# Patient Record
Sex: MALE | Race: WHITE | NOT HISPANIC OR LATINO | URBAN - METROPOLITAN AREA
[De-identification: names, ages, dates, MRNs, and addresses within clinical notes are randomized per-mention and may not be internally consistent; named-entity substitution may affect disease eponyms.]

---

## 2021-10-15 ENCOUNTER — INPATIENT (INPATIENT)
Facility: HOSPITAL | Age: 86
LOS: 3 days | Discharge: HOME | DRG: 378 | End: 2021-10-19
Attending: STUDENT IN AN ORGANIZED HEALTH CARE EDUCATION/TRAINING PROGRAM | Admitting: HOSPITALIST
Payer: MEDICARE

## 2021-10-15 VITALS
WEIGHT: 126.99 LBS | DIASTOLIC BLOOD PRESSURE: 83 MMHG | OXYGEN SATURATION: 96 % | HEIGHT: 68 IN | TEMPERATURE: 98 F | HEART RATE: 71 BPM | SYSTOLIC BLOOD PRESSURE: 153 MMHG | RESPIRATION RATE: 16 BRPM

## 2021-10-15 DIAGNOSIS — Z29.9 ENCOUNTER FOR PROPHYLACTIC MEASURES, UNSPECIFIED: ICD-10-CM

## 2021-10-15 DIAGNOSIS — K92.1 MELENA: ICD-10-CM

## 2021-10-15 DIAGNOSIS — R71.0 PRECIPITOUS DROP IN HEMATOCRIT: ICD-10-CM

## 2021-10-15 DIAGNOSIS — J44.9 CHRONIC OBSTRUCTIVE PULMONARY DISEASE, UNSPECIFIED: ICD-10-CM

## 2021-10-15 DIAGNOSIS — I10 ESSENTIAL (PRIMARY) HYPERTENSION: ICD-10-CM

## 2021-10-15 DIAGNOSIS — D62 ACUTE POSTHEMORRHAGIC ANEMIA: ICD-10-CM

## 2021-10-15 LAB
ALBUMIN SERPL ELPH-MCNC: 4.1 G/DL — SIGNIFICANT CHANGE UP (ref 3.3–5)
ALP SERPL-CCNC: 83 U/L — SIGNIFICANT CHANGE UP (ref 40–120)
ALT FLD-CCNC: 11 U/L — SIGNIFICANT CHANGE UP (ref 10–45)
ANION GAP SERPL CALC-SCNC: 13 MMOL/L — SIGNIFICANT CHANGE UP (ref 5–17)
APPEARANCE UR: CLEAR — SIGNIFICANT CHANGE UP
APTT BLD: 40 SEC — HIGH (ref 27.5–35.5)
AST SERPL-CCNC: 27 U/L — SIGNIFICANT CHANGE UP (ref 10–40)
BACTERIA # UR AUTO: NEGATIVE — SIGNIFICANT CHANGE UP
BASOPHILS # BLD AUTO: 0.05 K/UL — SIGNIFICANT CHANGE UP (ref 0–0.2)
BASOPHILS NFR BLD AUTO: 0.7 % — SIGNIFICANT CHANGE UP (ref 0–2)
BILIRUB SERPL-MCNC: 0.6 MG/DL — SIGNIFICANT CHANGE UP (ref 0.2–1.2)
BILIRUB UR-MCNC: NEGATIVE — SIGNIFICANT CHANGE UP
BLD GP AB SCN SERPL QL: NEGATIVE — SIGNIFICANT CHANGE UP
BUN SERPL-MCNC: 13 MG/DL — SIGNIFICANT CHANGE UP (ref 7–23)
CALCIUM SERPL-MCNC: 9.6 MG/DL — SIGNIFICANT CHANGE UP (ref 8.4–10.5)
CHLORIDE SERPL-SCNC: 101 MMOL/L — SIGNIFICANT CHANGE UP (ref 96–108)
CO2 SERPL-SCNC: 24 MMOL/L — SIGNIFICANT CHANGE UP (ref 22–31)
COLOR SPEC: COLORLESS — SIGNIFICANT CHANGE UP
CREAT SERPL-MCNC: 0.76 MG/DL — SIGNIFICANT CHANGE UP (ref 0.5–1.3)
DIFF PNL FLD: NEGATIVE — SIGNIFICANT CHANGE UP
EOSINOPHIL # BLD AUTO: 0.24 K/UL — SIGNIFICANT CHANGE UP (ref 0–0.5)
EOSINOPHIL NFR BLD AUTO: 3.3 % — SIGNIFICANT CHANGE UP (ref 0–6)
EPI CELLS # UR: 0 /HPF — SIGNIFICANT CHANGE UP
GLUCOSE SERPL-MCNC: 95 MG/DL — SIGNIFICANT CHANGE UP (ref 70–99)
GLUCOSE UR QL: NEGATIVE — SIGNIFICANT CHANGE UP
HCT VFR BLD CALC: 35.3 % — LOW (ref 39–50)
HCT VFR BLD CALC: 37.6 % — LOW (ref 39–50)
HGB BLD-MCNC: 11.1 G/DL — LOW (ref 13–17)
HGB BLD-MCNC: 12 G/DL — LOW (ref 13–17)
HYALINE CASTS # UR AUTO: 0 /LPF — SIGNIFICANT CHANGE UP (ref 0–2)
IMM GRANULOCYTES NFR BLD AUTO: 0.1 % — SIGNIFICANT CHANGE UP (ref 0–1.5)
INR BLD: 1.15 RATIO — SIGNIFICANT CHANGE UP (ref 0.88–1.16)
KETONES UR-MCNC: NEGATIVE — SIGNIFICANT CHANGE UP
LEUKOCYTE ESTERASE UR-ACNC: NEGATIVE — SIGNIFICANT CHANGE UP
LYMPHOCYTES # BLD AUTO: 1.11 K/UL — SIGNIFICANT CHANGE UP (ref 1–3.3)
LYMPHOCYTES # BLD AUTO: 15 % — SIGNIFICANT CHANGE UP (ref 13–44)
MCHC RBC-ENTMCNC: 30.6 PG — SIGNIFICANT CHANGE UP (ref 27–34)
MCHC RBC-ENTMCNC: 31.3 PG — SIGNIFICANT CHANGE UP (ref 27–34)
MCHC RBC-ENTMCNC: 31.4 GM/DL — LOW (ref 32–36)
MCHC RBC-ENTMCNC: 31.9 GM/DL — LOW (ref 32–36)
MCV RBC AUTO: 97.2 FL — SIGNIFICANT CHANGE UP (ref 80–100)
MCV RBC AUTO: 98.2 FL — SIGNIFICANT CHANGE UP (ref 80–100)
MONOCYTES # BLD AUTO: 0.84 K/UL — SIGNIFICANT CHANGE UP (ref 0–0.9)
MONOCYTES NFR BLD AUTO: 11.4 % — SIGNIFICANT CHANGE UP (ref 2–14)
NEUTROPHILS # BLD AUTO: 5.13 K/UL — SIGNIFICANT CHANGE UP (ref 1.8–7.4)
NEUTROPHILS NFR BLD AUTO: 69.5 % — SIGNIFICANT CHANGE UP (ref 43–77)
NITRITE UR-MCNC: NEGATIVE — SIGNIFICANT CHANGE UP
NRBC # BLD: 0 /100 WBCS — SIGNIFICANT CHANGE UP (ref 0–0)
NRBC # BLD: 0 /100 WBCS — SIGNIFICANT CHANGE UP (ref 0–0)
PH UR: 7 — SIGNIFICANT CHANGE UP (ref 5–8)
PLATELET # BLD AUTO: 219 K/UL — SIGNIFICANT CHANGE UP (ref 150–400)
PLATELET # BLD AUTO: 229 K/UL — SIGNIFICANT CHANGE UP (ref 150–400)
POTASSIUM SERPL-MCNC: 4.4 MMOL/L — SIGNIFICANT CHANGE UP (ref 3.5–5.3)
POTASSIUM SERPL-SCNC: 4.4 MMOL/L — SIGNIFICANT CHANGE UP (ref 3.5–5.3)
PROT SERPL-MCNC: 7 G/DL — SIGNIFICANT CHANGE UP (ref 6–8.3)
PROT UR-MCNC: NEGATIVE — SIGNIFICANT CHANGE UP
PROTHROM AB SERPL-ACNC: 13.7 SEC — HIGH (ref 10.6–13.6)
RBC # BLD: 3.63 M/UL — LOW (ref 4.2–5.8)
RBC # BLD: 3.83 M/UL — LOW (ref 4.2–5.8)
RBC # FLD: 13.3 % — SIGNIFICANT CHANGE UP (ref 10.3–14.5)
RBC # FLD: 13.3 % — SIGNIFICANT CHANGE UP (ref 10.3–14.5)
RBC CASTS # UR COMP ASSIST: 1 /HPF — SIGNIFICANT CHANGE UP (ref 0–4)
RH IG SCN BLD-IMP: POSITIVE — SIGNIFICANT CHANGE UP
SARS-COV-2 RNA SPEC QL NAA+PROBE: SIGNIFICANT CHANGE UP
SODIUM SERPL-SCNC: 138 MMOL/L — SIGNIFICANT CHANGE UP (ref 135–145)
SP GR SPEC: 1.01 — LOW (ref 1.01–1.02)
UROBILINOGEN FLD QL: NEGATIVE — SIGNIFICANT CHANGE UP
WBC # BLD: 5.92 K/UL — SIGNIFICANT CHANGE UP (ref 3.8–10.5)
WBC # BLD: 7.38 K/UL — SIGNIFICANT CHANGE UP (ref 3.8–10.5)
WBC # FLD AUTO: 5.92 K/UL — SIGNIFICANT CHANGE UP (ref 3.8–10.5)
WBC # FLD AUTO: 7.38 K/UL — SIGNIFICANT CHANGE UP (ref 3.8–10.5)
WBC UR QL: 0 /HPF — SIGNIFICANT CHANGE UP (ref 0–5)

## 2021-10-15 PROCEDURE — 93010 ELECTROCARDIOGRAM REPORT: CPT

## 2021-10-15 PROCEDURE — 99221 1ST HOSP IP/OBS SF/LOW 40: CPT | Mod: GC

## 2021-10-15 PROCEDURE — 99223 1ST HOSP IP/OBS HIGH 75: CPT | Mod: GC

## 2021-10-15 PROCEDURE — 99285 EMERGENCY DEPT VISIT HI MDM: CPT

## 2021-10-15 RX ORDER — CARVEDILOL PHOSPHATE 80 MG/1
6.25 CAPSULE, EXTENDED RELEASE ORAL EVERY 12 HOURS
Refills: 0 | Status: DISCONTINUED | OUTPATIENT
Start: 2021-10-15 | End: 2021-10-19

## 2021-10-15 RX ORDER — HYDRALAZINE HCL 50 MG
10 TABLET ORAL ONCE
Refills: 0 | Status: COMPLETED | OUTPATIENT
Start: 2021-10-15 | End: 2021-10-15

## 2021-10-15 RX ORDER — POLYETHYLENE GLYCOL 3350 17 G/17G
17 POWDER, FOR SOLUTION ORAL
Refills: 0 | Status: DISCONTINUED | OUTPATIENT
Start: 2021-10-15 | End: 2021-10-19

## 2021-10-15 RX ORDER — AMLODIPINE BESYLATE 2.5 MG/1
2.5 TABLET ORAL DAILY
Refills: 0 | Status: DISCONTINUED | OUTPATIENT
Start: 2021-10-15 | End: 2021-10-16

## 2021-10-15 RX ORDER — ATORVASTATIN CALCIUM 80 MG/1
40 TABLET, FILM COATED ORAL AT BEDTIME
Refills: 0 | Status: DISCONTINUED | OUTPATIENT
Start: 2021-10-15 | End: 2021-10-19

## 2021-10-15 RX ORDER — LISINOPRIL 2.5 MG/1
20 TABLET ORAL DAILY
Refills: 0 | Status: DISCONTINUED | OUTPATIENT
Start: 2021-10-15 | End: 2021-10-19

## 2021-10-15 RX ORDER — SENNA PLUS 8.6 MG/1
2 TABLET ORAL AT BEDTIME
Refills: 0 | Status: DISCONTINUED | OUTPATIENT
Start: 2021-10-15 | End: 2021-10-19

## 2021-10-15 RX ORDER — BUDESONIDE AND FORMOTEROL FUMARATE DIHYDRATE 160; 4.5 UG/1; UG/1
2 AEROSOL RESPIRATORY (INHALATION)
Refills: 0 | Status: DISCONTINUED | OUTPATIENT
Start: 2021-10-15 | End: 2021-10-19

## 2021-10-15 RX ORDER — INFLUENZA VIRUS VACCINE 15; 15; 15; 15 UG/.5ML; UG/.5ML; UG/.5ML; UG/.5ML
0.5 SUSPENSION INTRAMUSCULAR ONCE
Refills: 0 | Status: DISCONTINUED | OUTPATIENT
Start: 2021-10-15 | End: 2021-10-19

## 2021-10-15 RX ORDER — ALBUTEROL 90 UG/1
2 AEROSOL, METERED ORAL EVERY 6 HOURS
Refills: 0 | Status: DISCONTINUED | OUTPATIENT
Start: 2021-10-15 | End: 2021-10-16

## 2021-10-15 RX ADMIN — ATORVASTATIN CALCIUM 40 MILLIGRAM(S): 80 TABLET, FILM COATED ORAL at 23:05

## 2021-10-15 RX ADMIN — CARVEDILOL PHOSPHATE 6.25 MILLIGRAM(S): 80 CAPSULE, EXTENDED RELEASE ORAL at 18:58

## 2021-10-15 RX ADMIN — AMLODIPINE BESYLATE 2.5 MILLIGRAM(S): 2.5 TABLET ORAL at 18:59

## 2021-10-15 RX ADMIN — SENNA PLUS 2 TABLET(S): 8.6 TABLET ORAL at 23:04

## 2021-10-15 RX ADMIN — LISINOPRIL 20 MILLIGRAM(S): 2.5 TABLET ORAL at 18:58

## 2021-10-15 RX ADMIN — Medication 10 MILLIGRAM(S): at 23:51

## 2021-10-15 NOTE — ED PROVIDER NOTE - ATTENDING CONTRIBUTION TO CARE
I, Dakota Lozano, performed a history and physical exam of the patient and discussed their management with the resident and /or advanced care provider. I reviewed the resident and /or ACP's note and agree with the documented findings and plan of care. I was present and available for all procedures.  Patient with lower GI bleed. Exam c/w bleed from hemorrhoids. Will evaluate h/h and repeat h/h considering history of extent of bleeding. Will transfuse as indicate. Patient without bleeding in the ED which has resolve spontaneously.  Patient stable in ED.

## 2021-10-15 NOTE — ED ADULT NURSE REASSESSMENT NOTE - NS ED NURSE REASSESS COMMENT FT1
1245 No c/o pain or rectal bleeding at present. A&Ox4. IVL intact R wrist without sx of infilt. Fall risk precautions maintained.

## 2021-10-15 NOTE — H&P ADULT - PROBLEM SELECTOR PLAN 2
-trend CBC for HgB q12hr. If HgB continues to downtrend with additional ep of hematochezia, will revaluate role for intervention   -active type and screen. Transfuse if Hgb <8 ISO 2 episodes of hematochezia with clots. Hgb 12.0 @ 12pm and 11.1 @ 3:32 on 10/15.  -trend CBC for HgB q12hr. If HgB continues to downtrend with additional ep of hematochezia, will revaluate role for intervention   -active type and screen. Transfuse if Hgb <8  - GI on board

## 2021-10-15 NOTE — ED ADULT NURSE REASSESSMENT NOTE - NS ED NURSE REASSESS COMMENT FT1
1255 Voided 350cc clear yellow urine in urinal. Episode of melena, foul odor. Gown and chux saturated. Pt c/o lightheadedness. Dr aware. A&Ox4. 2nd T&S drawn and sent. 2nd IV inserted #18 LACF. Pt denies chest pain, palp or SOB

## 2021-10-15 NOTE — H&P ADULT - NSHPSOCIALHISTORY_GEN_ALL_CORE
Former smoke, social drinker Former smoke multiple packs per day, stopped ~ 20 years ago, Drank 3 beers a day, stopped 1 week before hernia surgery ~ 2 months ago

## 2021-10-15 NOTE — CONSULT NOTE ADULT - SUBJECTIVE AND OBJECTIVE BOX
Patient is a 86y old  Male who presents with a chief complaint of     HPI: 86yoM with hx of COPD (no home O2), CAD, recent left inguinal hernia repair presents with blood per rectum. Today morning, patient had urge to have BM but passed bright red blood with blood clots into the toilet over the course of 3 hours. He felt lightheaded and dizzy not denies syncope. No prior hx of GIB in the past. Recently, he had a left inguinal hernia surgery 1-2 months ago and developed constipation, 1 BM every other day with straining. He took otc bowel regimen and had softer stools but he recently discontinued it. Denies abdominal pain, N/V, diarrhea, or melena. Prior to today, he denies blood while wiping and denies pain with BM. He takes a daily baby ASA per his cardiologist for years. Intermittently takes Motrin for pain at surgical site. No other NSAID use or steriods. Last colonscopy many years ago, and unable to recall results. No prior EGD.     REVIEW OF SYSTEMS:  Constitutional: No fever, weight loss or fatigue  Respiratory: No cough, wheezing, chills or hemoptysis  Cardiovascular: No chest pain, palpitations, dizziness or leg swelling  Gastrointestinal: No abdominal or epigastric pain. No nausea, vomiting or hematemesis; No diarrhea. +constipation. No melena. +hematochezia.  Skin: No itching, burning, rashes or lesions   Musculoskeletal: No joint pain or swelling; No muscle, back or extremity pain    PAST MEDICAL & SURGICAL HISTORY:  HTN (hypertension)    COPD mixed type    Surgical Hx  Left inguinal hernia repair    FAMILY HISTORY:  Daughter passed from unknown cancer 5 years ago    SOCIAL HISTORY:  Smoking Status: [ ] Current, [X] Former, [ ] Never  Social drinker  No recreational drug use    MEDICATIONS:  Home meds: incomplete list but includes 81mg ASA, atorvastatin, albuterol inhaler, and ellipta    Allergies    No Known Allergies    Intolerances        Vital Signs Last 24 Hrs  T(C): 36.9 (15 Oct 2021 14:54), Max: 36.9 (15 Oct 2021 11:58)  T(F): 98.4 (15 Oct 2021 14:54), Max: 98.5 (15 Oct 2021 11:58)  HR: 65 (15 Oct 2021 14:54) (65 - 71)  BP: 157/82 (15 Oct 2021 14:54) (153/83 - 183/88)  BP(mean): --  RR: 16 (15 Oct 2021 14:54) (16 - 16)  SpO2: 96% (15 Oct 2021 14:54) (96% - 96%)        PHYSICAL EXAM:    General: Well developed; well nourished; in no acute distress  HEENT: MMM, conjunctiva and sclera clear  Cardiac: RRR with S1 and S2, no murmurs  Respiratory: CTA b/l, no wheezes or rhonchi   Gastrointestinal: Soft, non-tender non-distended; Normal bowel sounds; No rebound or guarding. Well healing left inguinal surgical scar.   Rectal: dark red blood rectal vault, enlarged prostate. nontender.   Extremities: Normal range of motion, No clubbing, cyanosis or edema  Neurological: Alert and oriented x3  Skin: Warm and dry. No obvious rash      LABS:                        12.0   7.38  )-----------( 229      ( 15 Oct 2021 12:12 )             37.6     10-15    138  |  101  |  13  ----------------------------<  95  4.4   |  24  |  0.76    Ca    9.6      15 Oct 2021 12:12    TPro  7.0  /  Alb  4.1  /  TBili  0.6  /  DBili  x   /  AST  27  /  ALT  11  /  AlkPhos  83  10-15        RADIOLOGY & ADDITIONAL STUDIES:

## 2021-10-15 NOTE — ED PROVIDER NOTE - RECTUM PROSTATE EXAM
+small external hemorrhoid @6oclock without active bleeding. small amt of BRB in underwear/abnormal/expand...

## 2021-10-15 NOTE — H&P ADULT - ASSESSMENT
85yo M with hx of COPD (no home O2), CAD, left inguinal hernia repair presents with 1 day of BRBPR with Hgb 12 likely in setting of LGIB (diverticular vs internal hemorrhoids vs AVM) 86 y.o M with hx of COPD (no home O2), CAD, left inguinal hernia repair presents with 1 day of BRBPR with Hgb 12 likely in setting of LGIB (diverticular vs internal hemorrhoids vs AVM). Patient hemodynamically stable.

## 2021-10-15 NOTE — H&P ADULT - NSHPREVIEWOFSYSTEMS_GEN_ALL_CORE
REVIEW OF SYSTEMS:  Constitutional: No fever, weight loss or fatigue  Respiratory: No cough, wheezing, chills or hemoptysis  Cardiovascular: No chest pain, palpitations, dizziness or leg swelling  Gastrointestinal: No abdominal or epigastric pain. No nausea, vomiting or hematemesis; No diarrhea. +constipation. No melena. +hematochezia.  Skin: No itching, burning, rashes or lesions   Musculoskeletal: No joint pain or swelling; No muscle, back or extremity pain REVIEW OF SYSTEMS:  Constitutional: No fever, weight loss or fatigue  Respiratory: + mild SOB  Cardiovascular: No chest pain, palpitations, dizziness or leg swelling  Gastrointestinal: No abdominal or epigastric pain. No nausea, vomiting or hematemesis; No diarrhea. +constipation. No melena. +hematochezia.  Skin: No itching, burning, rashes or lesions   Musculoskeletal: No joint pain or swelling; No muscle, back or extremity pain

## 2021-10-15 NOTE — H&P ADULT - NSHPPHYSICALEXAM_GEN_ALL_CORE
Vital Signs Last 24 Hrs  T(C): 36.9 (15 Oct 2021 14:54), Max: 36.9 (15 Oct 2021 11:58)  T(F): 98.4 (15 Oct 2021 14:54), Max: 98.5 (15 Oct 2021 11:58)  HR: 65 (15 Oct 2021 14:54) (65 - 71)  BP: 157/82 (15 Oct 2021 14:54) (153/83 - 183/88)  BP(mean): --  RR: 16 (15 Oct 2021 14:54) (16 - 16)  SpO2: 96% (15 Oct 2021 14:54) (96% - 96%)      General: Well developed; well nourished; in no acute distress  HEENT: MMM, conjunctiva and sclera clear  Cardiac: RRR with S1 and S2, no murmurs  Respiratory: CTA b/l, no wheezes or rhonchi   Gastrointestinal: Soft, non-tender non-distended; Normal bowel sounds; No rebound or guarding. Well healing left inguinal surgical scar.   Rectal: dark red blood rectal vault, enlarged prostate. nontender.   Extremities: Normal range of motion, No clubbing, cyanosis or edema  Neurological: Alert and oriented x3  Skin: Warm and dry. No obvious rash Vital Signs Last 24 Hrs  T(C): 36.9 (15 Oct 2021 14:54), Max: 36.9 (15 Oct 2021 11:58)  T(F): 98.4 (15 Oct 2021 14:54), Max: 98.5 (15 Oct 2021 11:58)  HR: 65 (15 Oct 2021 14:54) (65 - 71)  BP: 157/82 (15 Oct 2021 14:54) (153/83 - 183/88)  BP(mean): --  RR: 16 (15 Oct 2021 14:54) (16 - 16)  SpO2: 96% (15 Oct 2021 14:54) (96% - 96%)      General: Well developed; well nourished; in no acute distress  HEENT: MMM, conjunctiva pallor  Cardiac: Difficulty auscultating  Respiratory: CTA b/l, no wheezes or rhonchi   Gastrointestinal: Soft, non-tender non-distended; Normal bowel sounds; No rebound or guarding. Well healing left inguinal surgical scar.   Rectal: dark red blood rectal vault, enlarged prostate. nontender.   Extremities: Normal range of motion, No clubbing, cyanosis or edema  Neurological: Alert and oriented x3  Skin: Warm and dry. No obvious rash

## 2021-10-15 NOTE — CONSULT NOTE ADULT - ASSESSMENT
85yo M with hx of COPD (no home O2), CAD, left inguinal hernia repair presents with 1 day of BRBPR with Hgb 12 likely in setting of LGIB (diverticular vs internal hemorrhoids vs AVM)    #Hematochezia: 1 episode in the morning with passage of blood clots, painless. Uses low dose ASA. Hx of constipation. Initial Hgb 12. Differentials for LGIB include diverticular bleed vs internal hemorrhoids vs AVMs)  #Constipation: BM every other day with straining    Recommendations:  -trend CBC for HgB q12hr. If HgB continues to downtrend with additional ep of hematochezia, will revaluate role for intervention   -active type and screen. Transfuse if Hgb <8  -start Miralax BID PRN    *pending final recs    Danielle Swanson MD  PGY3 Internal Medicine   Pager [phone]   87yo M with hx of COPD (no home O2), CAD, left inguinal hernia repair presents with 1 day of BRBPR with Hgb 12 likely in setting of LGIB (diverticular vs internal hemorrhoids vs AVM)    #Hematochezia: 1 episode in the morning with passage of blood clots, painless. Uses low dose ASA. Hx of constipation. Initial Hgb 12. Differentials for LGIB include diverticular bleed vs internal hemorrhoids vs AVMs)  #Constipation: BM every other day with straining    Recommendations:  -trend CBC for HgB q12hr. If HgB continues to downtrend with additional ep of hematochezia, will revaluate role for intervention   -active type and screen. Transfuse if Hgb <8  -start Miralax BID PRN    d/w with Dr. Sultan Danielle Swanson MD  PGY3 Internal Medicine   Pager [phone]

## 2021-10-15 NOTE — ED PROVIDER NOTE - CLINICAL SUMMARY MEDICAL DECISION MAKING FREE TEXT BOX
Patient with lower GI bleed. Exam c/w bleed from hemorrhoids. Will evaluate h/h and repeat h/h considering history of extent of bleeding. Will transfuse as indicate. Patient without bleeding in the ED which has resolve spontaneously.  Patient currently stable.

## 2021-10-15 NOTE — ED ADULT NURSE NOTE - OBJECTIVE STATEMENT
85 yo presents to the ED from home. A&Ox4 by EMS with wife at bedside c/o rectal bleeding since this AM. pt reports multiple episodes of bright red blood after having BM this morning. pt reports "a lot of blood gushing out" for one hour. pt reports fatigue at the time, denies fatigue SOB currently. pt reports being on aspirin daily. pt denies abd pain. pt denies nausea, vomiting. abd soft nondistended nontender. 20G in R forearm. Patient undressed and placed into gown, call bell in hand and side rails up for safety. warm blanket provided, vital signs stable, pt in no acute distress.

## 2021-10-15 NOTE — ED PROVIDER NOTE - PROGRESS NOTE DETAILS
H/H 12/37.6. Pt had another episode of hematochezia with moderate amt of blood, several small clots. VSS and pt feels well. will repeat CBC. pt tba for hematochezia. call placed to pts PMD x1 awaiting callback - Dontrell Anderson PA-C spoke to Dr. Oliveros - admits to general hospitalist. GI consult sent via email. pt stable for admission. - Dontrell Anderson PA-C

## 2021-10-15 NOTE — ED PROVIDER NOTE - OBJECTIVE STATEMENT
86y male PMHx HTN, COPD, takes ASA 81mg daily (preventative) p/w BRBPR. pt reports significant amt of painless bright red blood during a bowel movement today. reports it "poured" into the toilet intermittently for about an hour before resolving. never had this before. denies history of hemorrhoids, colon ca, polyps. denies abdominal pain, NVDC, urinary symptoms, bruising, CP, SOB, weakness. ---- 86y male PMHx HTN, COPD, takes ASA 81mg daily (preventative) p/w BRBPR. pt reports significant amt of painless bright red blood during a bowel movement today. reports it "poured" into the toilet intermittently for about an hour before resolving. never had this before. felt lightheaded briefly during the episode, does not feel lightheaded currently. denies history of hemorrhoids, colon ca, polyps. denies abdominal pain, NVDC, urinary symptoms, bruising, CP, SOB, weakness.

## 2021-10-15 NOTE — H&P ADULT - HISTORY OF PRESENT ILLNESS
HPI: 86yoM with hx of COPD (no home O2), CAD, recent left inguinal hernia repair presents with blood per rectum. Today morning, patient had urge to have BM but passed bright red blood with blood clots into the toilet over the course of 3 hours. He felt lightheaded and dizzy not denies syncope. No prior hx of GIB in the past. Recently, he had a left inguinal hernia surgery 1-2 months ago and developed constipation, 1 BM every other day with straining. He took otc bowel regimen and had softer stools but he recently discontinued it. Denies abdominal pain, N/V, diarrhea, or melena. Prior to today, he denies blood while wiping and denies pain with BM. He takes a daily baby ASA per his cardiologist for years. Intermittently takes Motrin for pain at surgical site. No other NSAID use or steriods. Last colonscopy many years ago, and unable to recall results. No prior EGD.  HPI: 86yoM with hx of COPD (no home O2), CAD, recent left inguinal hernia repair 2021 presents with blood per rectum. Today morning, patient had urge to have BM but passed bright red blood with blood clots into the toilet over the course of 3 hours. He felt lightheaded and dizzy not denies syncope. No prior hx of GIB in the past. Recently, he had a left inguinal hernia surgery 1-2 months ago and developed constipation, 1 BM every other day with straining. He took otc bowel regimen and had softer stools but he recently discontinued it. Denies abdominal pain, N/V, diarrhea, or melena. Prior to today, he denies blood while wiping and denies pain with BM. He takes a daily baby ASA per his cardiologist for years. Intermittently takes Motrin for pain at surgical site. No other NSAID use or steriods. Last colonscopy many years ago, and unable to recall results. No prior EGD.  HPI: 86yoM with hx of COPD, CAD, recent left inguinal hernia repair 2021 presents with blood per rectum. Today morning, patient had urge to have BM but passed bright red blood with blood clots into the toilet over the course of 3 hours. He felt lightheaded and dizzy not denies syncope. No prior hx of GIB in the past. Recently, he had a left inguinal hernia surgery 1-2 months ago and developed constipation, 1 BM every other day with straining. He took otc bowel regimen and had softer stools but he recently discontinued it. Denies abdominal pain, N/V, diarrhea, or melena. Prior to today, he denies blood while wiping and denies pain with BM. He takes a daily baby ASA per his cardiologist for years. Intermittently takes Motrin for pain at surgical site. No other NSAID use or steriods. Last colonscopy many years ago, and unable to recall results. No prior EGD.

## 2021-10-15 NOTE — CONSULT NOTE ADULT - ATTENDING COMMENTS
GI bleeding  two episodes BRPPR  Suspect colon source  Patient not ready to commit to colonoscopy  Will follow clinically  Continue regular diet

## 2021-10-15 NOTE — H&P ADULT - PROBLEM SELECTOR PLAN 1
1 episode in the morning with passage of blood clots, painless. Uses low dose ASA. Hx of constipation. Initial Hgb 12. Differentials for LGIB include diverticular bleed vs internal hemorrhoids vs AVMs)  - Constipation: BM every other day with straining  -trend CBC for HgB q12hr. If HgB continues to downtrend with additional ep of hematochezia, will revaluate role for intervention   -active type and screen. Transfuse if Hgb <8  -start Miralax BID PRN One episode in the morning with passage of blood clots, painless. second episode with blood clots in ED. Uses low dose ASA. Hx of constipation. Initial Hgb 12. Differentials for LGIB include diverticular bleed vs internal hemorrhoids vs AVMs)  - Constipation: BM every other day with straining  - trend CBC for HgB q12hr. If HgB continues to downtrend with additional ep of hematochezia, will revaluate role for intervention   -active type and screen. Transfuse if Hgb <8  -start Miralax BID PRN  - GI on board

## 2021-10-15 NOTE — H&P ADULT - ATTENDING COMMENTS
86yoM with hx of COPD, CAD, recent left inguinal hernia repair 2021 pw episodes of hematochezia.     Hemodynamically stable. Monitor bleeding and CBC. Bowel regimen for constipation. GI on board.     No current COPD exacerbation.

## 2021-10-15 NOTE — H&P ADULT - NSHPLABSRESULTS_GEN_ALL_CORE
11.1   5.92  )-----------( 219      ( 15 Oct 2021 15:32 )             35.3       10-15    138  |  101  |  13  ----------------------------<  95  4.4   |  24  |  0.76    Ca    9.6      15 Oct 2021 12:12  TPro  7.0  /  Alb  4.1  /  TBili  0.6  /  DBili  x   /  AST  27  /  ALT  11  /  AlkPhos  83  10-15    PT/INR - ( 15 Oct 2021 12:12 )   PT: 13.7 sec;   INR: 1.15 ratio         PTT - ( 15 Oct 2021 12:12 )  PTT:40.0 sec      Urinalysis Basic - ( 15 Oct 2021 13:10 )    Color: Colorless / Appearance: Clear / S.008 / pH: x  Gluc: x / Ketone: Negative  / Bili: Negative / Urobili: Negative   Blood: x / Protein: Negative / Nitrite: Negative   Leuk Esterase: Negative / RBC: 1 /hpf / WBC 0 /HPF   Sq Epi: x / Non Sq Epi: 0 /hpf / Bacteria: Negative

## 2021-10-16 LAB
ANION GAP SERPL CALC-SCNC: 13 MMOL/L — SIGNIFICANT CHANGE UP (ref 5–17)
BUN SERPL-MCNC: 12 MG/DL — SIGNIFICANT CHANGE UP (ref 7–23)
CALCIUM SERPL-MCNC: 9.5 MG/DL — SIGNIFICANT CHANGE UP (ref 8.4–10.5)
CHLORIDE SERPL-SCNC: 100 MMOL/L — SIGNIFICANT CHANGE UP (ref 96–108)
CO2 SERPL-SCNC: 26 MMOL/L — SIGNIFICANT CHANGE UP (ref 22–31)
COVID-19 SPIKE DOMAIN AB INTERP: POSITIVE
COVID-19 SPIKE DOMAIN ANTIBODY RESULT: 219 U/ML — HIGH
CREAT SERPL-MCNC: 0.8 MG/DL — SIGNIFICANT CHANGE UP (ref 0.5–1.3)
GLUCOSE SERPL-MCNC: 97 MG/DL — SIGNIFICANT CHANGE UP (ref 70–99)
HCT VFR BLD CALC: 36 % — LOW (ref 39–50)
HGB BLD-MCNC: 11.4 G/DL — LOW (ref 13–17)
MAGNESIUM SERPL-MCNC: 1.7 MG/DL — SIGNIFICANT CHANGE UP (ref 1.6–2.6)
MCHC RBC-ENTMCNC: 30.4 PG — SIGNIFICANT CHANGE UP (ref 27–34)
MCHC RBC-ENTMCNC: 31.7 GM/DL — LOW (ref 32–36)
MCV RBC AUTO: 96 FL — SIGNIFICANT CHANGE UP (ref 80–100)
NRBC # BLD: 0 /100 WBCS — SIGNIFICANT CHANGE UP (ref 0–0)
PHOSPHATE SERPL-MCNC: 3.4 MG/DL — SIGNIFICANT CHANGE UP (ref 2.5–4.5)
PLATELET # BLD AUTO: 226 K/UL — SIGNIFICANT CHANGE UP (ref 150–400)
POTASSIUM SERPL-MCNC: 4.2 MMOL/L — SIGNIFICANT CHANGE UP (ref 3.5–5.3)
POTASSIUM SERPL-SCNC: 4.2 MMOL/L — SIGNIFICANT CHANGE UP (ref 3.5–5.3)
RBC # BLD: 3.75 M/UL — LOW (ref 4.2–5.8)
RBC # FLD: 13.3 % — SIGNIFICANT CHANGE UP (ref 10.3–14.5)
SARS-COV-2 IGG+IGM SERPL QL IA: 219 U/ML — HIGH
SARS-COV-2 IGG+IGM SERPL QL IA: POSITIVE
SODIUM SERPL-SCNC: 139 MMOL/L — SIGNIFICANT CHANGE UP (ref 135–145)
WBC # BLD: 6.28 K/UL — SIGNIFICANT CHANGE UP (ref 3.8–10.5)
WBC # FLD AUTO: 6.28 K/UL — SIGNIFICANT CHANGE UP (ref 3.8–10.5)

## 2021-10-16 PROCEDURE — 99232 SBSQ HOSP IP/OBS MODERATE 35: CPT | Mod: GC

## 2021-10-16 RX ORDER — IPRATROPIUM/ALBUTEROL SULFATE 18-103MCG
3 AEROSOL WITH ADAPTER (GRAM) INHALATION EVERY 6 HOURS
Refills: 0 | Status: DISCONTINUED | OUTPATIENT
Start: 2021-10-16 | End: 2021-10-19

## 2021-10-16 RX ORDER — AMLODIPINE BESYLATE 2.5 MG/1
10 TABLET ORAL DAILY
Refills: 0 | Status: DISCONTINUED | OUTPATIENT
Start: 2021-10-17 | End: 2021-10-19

## 2021-10-16 RX ORDER — IPRATROPIUM/ALBUTEROL SULFATE 18-103MCG
3 AEROSOL WITH ADAPTER (GRAM) INHALATION ONCE
Refills: 0 | Status: COMPLETED | OUTPATIENT
Start: 2021-10-16 | End: 2021-10-16

## 2021-10-16 RX ORDER — AMLODIPINE BESYLATE 2.5 MG/1
5 TABLET ORAL ONCE
Refills: 0 | Status: COMPLETED | OUTPATIENT
Start: 2021-10-16 | End: 2021-10-16

## 2021-10-16 RX ORDER — TIOTROPIUM BROMIDE 18 UG/1
1 CAPSULE ORAL; RESPIRATORY (INHALATION) DAILY
Refills: 0 | Status: DISCONTINUED | OUTPATIENT
Start: 2021-10-16 | End: 2021-10-16

## 2021-10-16 RX ADMIN — ATORVASTATIN CALCIUM 40 MILLIGRAM(S): 80 TABLET, FILM COATED ORAL at 21:33

## 2021-10-16 RX ADMIN — CARVEDILOL PHOSPHATE 6.25 MILLIGRAM(S): 80 CAPSULE, EXTENDED RELEASE ORAL at 05:03

## 2021-10-16 RX ADMIN — Medication 3 MILLILITER(S): at 01:11

## 2021-10-16 RX ADMIN — CARVEDILOL PHOSPHATE 6.25 MILLIGRAM(S): 80 CAPSULE, EXTENDED RELEASE ORAL at 17:54

## 2021-10-16 RX ADMIN — ALBUTEROL 2 PUFF(S): 90 AEROSOL, METERED ORAL at 00:56

## 2021-10-16 RX ADMIN — SENNA PLUS 2 TABLET(S): 8.6 TABLET ORAL at 21:33

## 2021-10-16 RX ADMIN — LISINOPRIL 20 MILLIGRAM(S): 2.5 TABLET ORAL at 05:04

## 2021-10-16 RX ADMIN — AMLODIPINE BESYLATE 5 MILLIGRAM(S): 2.5 TABLET ORAL at 11:16

## 2021-10-16 RX ADMIN — BUDESONIDE AND FORMOTEROL FUMARATE DIHYDRATE 2 PUFF(S): 160; 4.5 AEROSOL RESPIRATORY (INHALATION) at 17:56

## 2021-10-16 NOTE — PROVIDER CONTACT NOTE (OTHER) - BACKGROUND
Admitted with bright red blood by rectum Hx of CAD  COPD
Admitted with Bright red blood per rectum Hx of COPD, CAD

## 2021-10-16 NOTE — PROVIDER CONTACT NOTE (OTHER) - ACTION/TREATMENT ORDERED:
Same administered will continue to monitor B/p
Albuterol 2puffs given as per PRN order with no relief .Duoneb nebulizerx1 given with relief will continue to monitor breathing pattern

## 2021-10-16 NOTE — PROVIDER CONTACT NOTE (OTHER) - ASSESSMENT
Pt awake alert oriented x3 denies headache blurred vision
Pt awake alert oriented x4 with SOB  respiratory rate 22breaths/min O2sat 96%in room air  stating feeling no air going in no wheezing on ausculation

## 2021-10-16 NOTE — PROGRESS NOTE ADULT - SUBJECTIVE AND OBJECTIVE BOX
VENITA TRUJILLOOLPH  86y  Male      Patient is a 86y old  Male who presents with a chief complaint of Hematochezia with clots (15 Oct 2021 17:20)      INTERVAL HPI/OVERNIGHT EVENTS: Hb stabilized @ 11.1 on 10/15 night. BP of 188/80 -> hydralazine 10. SBP this am 10/16 -> 147. SOB overnight, given nebulizer as albuterol didn't help.      REVIEW OF SYSTEMS:  Constitutional: No fever, weight loss or fatigue  Respiratory: + mild SOB, no wheezing  Cardiovascular: No chest pain, palpitations, dizziness or leg swelling  Gastrointestinal: No abdominal or epigastric pain. No nausea, vomiting or hematemesis; +constipation. +hematochezia.  Skin: No itching, burning, rashes or lesions   Musculoskeletal: No joint pain or swelling; No muscle, back or extremity pain    T(C): 36.6 (10-16-21 @ 04:18), Max: 37.3 (10-15-21 @ 16:21)  HR: 66 (10-16-21 @ 04:18) (62 - 71)  BP: 147/68 (10-16-21 @ 04:18) (147/68 - 188/80)  RR: 18 (10-16-21 @ 04:18) (16 - 23)  SpO2: 95% (10-16-21 @ 04:18) (95% - 99%)  Wt(kg): --Vital Signs Last 24 Hrs  T(C): 36.6 (16 Oct 2021 04:18), Max: 37.3 (15 Oct 2021 16:21)  T(F): 97.9 (16 Oct 2021 04:18), Max: 99.1 (15 Oct 2021 16:21)  HR: 66 (16 Oct 2021 04:18) (62 - 71)  BP: 147/68 (16 Oct 2021 04:18) (147/68 - 188/80)  BP(mean): --  RR: 18 (16 Oct 2021 04:18) (16 - 23)  SpO2: 95% (16 Oct 2021 04:18) (95% - 99%)  No Known Allergies      PHYSICAL EXAM:    General: Well developed; well nourished; in no acute distress  HEENT: MMM, conjunctiva pallor  Cardiac: Difficulty auscultating  Respiratory: CTA b/l, no wheezes or rhonchi   Gastrointestinal: Soft, non-tender non-distended; Normal bowel sounds; No rebound or guarding. Well healing left inguinal surgical scar.   Rectal: dark red blood rectal vault, enlarged prostate. nontender.   Extremities: Normal range of motion, No clubbing, cyanosis or edema  Neurological: Alert and oriented x3  Skin: Warm and dry. No obvious rash      LABS:                          11.4   6.28  )-----------( 226      ( 16 Oct 2021 05:11 )             36.0       10    139  |  100  |  12  ----------------------------<  97  4.2   |  26  |  0.80    Ca    9.5      16 Oct 2021 05:11  Mg     1.7     10-16  Phos  3.4     10-16  TPro  7.0  /  Alb  4.1  /  TBili  0.6  /  DBili  x   /  AST  27  /  ALT  11  /  AlkPhos  83  10-15    PT/INR - ( 15 Oct 2021 12:12 )   PT: 13.7 sec;   INR: 1.15 ratio         PTT - ( 15 Oct 2021 12:12 )  PTT:40.0 sec      Urinalysis Basic - ( 15 Oct 2021 13:10 )    Color: Colorless / Appearance: Clear / S.008 / pH: x  Gluc: x / Ketone: Negative  / Bili: Negative / Urobili: Negative   Blood: x / Protein: Negative / Nitrite: Negative   Leuk Esterase: Negative / RBC: 1 /hpf / WBC 0 /HPF   Sq Epi: x / Non Sq Epi: 0 /hpf / Bacteria: Negative          RADIOLOGY & ADDITIONAL TESTS:    Imaging Personally Reviewed:  [ ] YES  [ ] NO  ALBUTerol    90 MICROgram(s) HFA Inhaler 2 Puff(s) Inhalation every 6 hours PRN  amLODIPine   Tablet 2.5 milliGRAM(s) Oral daily  atorvastatin 40 milliGRAM(s) Oral at bedtime  budesonide  80 MICROgram(s)/formoterol 4.5 MICROgram(s) Inhaler 2 Puff(s) Inhalation two times a day  carvedilol 6.25 milliGRAM(s) Oral every 12 hours  influenza   Vaccine 0.5 milliLiter(s) IntraMuscular once  lisinopril 20 milliGRAM(s) Oral daily  polyethylene glycol 3350 17 Gram(s) Oral two times a day PRN  senna 2 Tablet(s) Oral at bedtime      HEALTH ISSUES - PROBLEM Dx:  Hematochezia    Acute blood loss anemia    Drop in hemoglobin    HTN (hypertension)    COPD mixed type    Prophylactic measure

## 2021-10-17 LAB
HCT VFR BLD CALC: 35.6 % — LOW (ref 39–50)
HGB BLD-MCNC: 11.3 G/DL — LOW (ref 13–17)
MCHC RBC-ENTMCNC: 30.5 PG — SIGNIFICANT CHANGE UP (ref 27–34)
MCHC RBC-ENTMCNC: 31.7 GM/DL — LOW (ref 32–36)
MCV RBC AUTO: 96.2 FL — SIGNIFICANT CHANGE UP (ref 80–100)
NRBC # BLD: 0 /100 WBCS — SIGNIFICANT CHANGE UP (ref 0–0)
PLATELET # BLD AUTO: 214 K/UL — SIGNIFICANT CHANGE UP (ref 150–400)
RBC # BLD: 3.7 M/UL — LOW (ref 4.2–5.8)
RBC # FLD: 13.2 % — SIGNIFICANT CHANGE UP (ref 10.3–14.5)
WBC # BLD: 7.34 K/UL — SIGNIFICANT CHANGE UP (ref 3.8–10.5)
WBC # FLD AUTO: 7.34 K/UL — SIGNIFICANT CHANGE UP (ref 3.8–10.5)

## 2021-10-17 PROCEDURE — 99232 SBSQ HOSP IP/OBS MODERATE 35: CPT | Mod: GC

## 2021-10-17 RX ORDER — SOD SULF/SODIUM/NAHCO3/KCL/PEG
1000 SOLUTION, RECONSTITUTED, ORAL ORAL EVERY 4 HOURS
Refills: 0 | Status: COMPLETED | OUTPATIENT
Start: 2021-10-17 | End: 2021-10-17

## 2021-10-17 RX ADMIN — BUDESONIDE AND FORMOTEROL FUMARATE DIHYDRATE 2 PUFF(S): 160; 4.5 AEROSOL RESPIRATORY (INHALATION) at 06:27

## 2021-10-17 RX ADMIN — BUDESONIDE AND FORMOTEROL FUMARATE DIHYDRATE 2 PUFF(S): 160; 4.5 AEROSOL RESPIRATORY (INHALATION) at 18:07

## 2021-10-17 RX ADMIN — CARVEDILOL PHOSPHATE 6.25 MILLIGRAM(S): 80 CAPSULE, EXTENDED RELEASE ORAL at 18:07

## 2021-10-17 RX ADMIN — ATORVASTATIN CALCIUM 40 MILLIGRAM(S): 80 TABLET, FILM COATED ORAL at 21:50

## 2021-10-17 RX ADMIN — Medication 1000 MILLILITER(S): at 16:55

## 2021-10-17 RX ADMIN — Medication 1000 MILLILITER(S): at 19:59

## 2021-10-17 RX ADMIN — SENNA PLUS 2 TABLET(S): 8.6 TABLET ORAL at 21:50

## 2021-10-17 RX ADMIN — Medication 10 MILLIGRAM(S): at 21:50

## 2021-10-17 RX ADMIN — CARVEDILOL PHOSPHATE 6.25 MILLIGRAM(S): 80 CAPSULE, EXTENDED RELEASE ORAL at 05:28

## 2021-10-17 RX ADMIN — LISINOPRIL 20 MILLIGRAM(S): 2.5 TABLET ORAL at 05:28

## 2021-10-17 RX ADMIN — AMLODIPINE BESYLATE 10 MILLIGRAM(S): 2.5 TABLET ORAL at 05:28

## 2021-10-17 NOTE — CHART NOTE - NSCHARTNOTEFT_GEN_A_CORE
Contacted by nurse for patient having a BP of 188/80. Gave patient hydralazine 10. Patient also complaining of shortness of breath. On PE, lungs were clear to auscultation. Satting well. States that he usually uses a nebulizer at home. Patient was given albuterol without improvement of symptoms. Ordered duoneb treatment as well.
GI following for hematochezia. Initially patient hesitant to pursue colonoscopy, now agreeable. Will plan for colonoscopy tomorrow.     Instructions for colonoscopy  - clear liquid diet today, NPO at midnight  - please ensure golytely is completed (to be ordered by GI fellow)  - please ensure patient drinks entire prep  - please ensure morning labs are drawn at 2am, electrolytes repleted as necessary, and Hg>7  - rest of care per primary team    GI will continue to follow.     Malachi Hayden, PGY5  Gastroenterology/Hepatology Fellow  Available on [employer] Teams  [phone] ([facility] Short Range Pager)  [phone] (Long Range Pager)    After 5pm, please contact the on-call GI fellow. [phone]

## 2021-10-17 NOTE — PROGRESS NOTE ADULT - SUBJECTIVE AND OBJECTIVE BOX
PROGRESS NOTE:     Patient is a 86y old  Male who presents with a chief complaint of Hematochezia with clots (16 Oct 2021 07:26)      SUBJECTIVE / OVERNIGHT EVENTS: Pt seen and examined. No acute overnight events. In the morning, pt denies fever, chills, CP, SOB, abdominal pain, N/V, urinary or bowel issues.     ADDITIONAL REVIEW OF SYSTEMS: Otherwise negative    MEDICATIONS  (STANDING):  amLODIPine   Tablet 10 milliGRAM(s) Oral daily  atorvastatin 40 milliGRAM(s) Oral at bedtime  bisacodyl 10 milliGRAM(s) Oral at bedtime  budesonide  80 MICROgram(s)/formoterol 4.5 MICROgram(s) Inhaler 2 Puff(s) Inhalation two times a day  carvedilol 6.25 milliGRAM(s) Oral every 12 hours  influenza   Vaccine 0.5 milliLiter(s) IntraMuscular once  lisinopril 20 milliGRAM(s) Oral daily  polyethylene glycol/electrolyte Solution 1000 milliLiter(s) Oral every 4 hours  senna 2 Tablet(s) Oral at bedtime    MEDICATIONS  (PRN):  albuterol/ipratropium for Nebulization 3 milliLiter(s) Nebulizer every 6 hours PRN Shortness of Breath and/or Wheezing  polyethylene glycol 3350 17 Gram(s) Oral two times a day PRN Constipation      CAPILLARY BLOOD GLUCOSE        I&O's Summary      PHYSICAL EXAM:  Vital Signs Last 24 Hrs  T(C): 36.8 (17 Oct 2021 05:10), Max: 36.8 (17 Oct 2021 05:10)  T(F): 98.2 (17 Oct 2021 05:10), Max: 98.2 (17 Oct 2021 05:10)  HR: 61 (17 Oct 2021 05:10) (61 - 76)  BP: 164/72 (17 Oct 2021 05:10) (116/63 - 172/75)  BP(mean): --  RR: 18 (17 Oct 2021 05:10) (18 - 20)  SpO2: 96% (17 Oct 2021 05:10) (95% - 98%)    CONSTITUTIONAL: NAD  RESPIRATORY: Normal respiratory effort; lungs are clear to auscultation bilaterally  CARDIOVASCULAR: Regular rate and rhythm, normal S1 and S2, no murmur/rub/gallop  ABDOMEN: Nontender to palpation, normoactive bowel sounds, no rebound/guarding; No hepatosplenomegaly  EXTREMITIES: No lower extremity edema; Peripheral pulses are 2+ bilaterally  MUSCLOSKELETAL: no clubbing or cyanosis of digits; no joint swelling or tenderness to palpation  PSYCH: A+O to person, place, and time; affect appropriate    LABS:                        11.4   6.28  )-----------( 226      ( 16 Oct 2021 05:11 )             36.0     10-16    139  |  100  |  12  ----------------------------<  97  4.2   |  26  |  0.80    Ca    9.5      16 Oct 2021 05:11  Phos  3.4     10-16  Mg     1.7     10-16    TPro  7.0  /  Alb  4.1  /  TBili  0.6  /  DBili  x   /  AST  27  /  ALT  11  /  AlkPhos  83  10-15    PT/INR - ( 15 Oct 2021 12:12 )   PT: 13.7 sec;   INR: 1.15 ratio         PTT - ( 15 Oct 2021 12:12 )  PTT:40.0 sec      Urinalysis Basic - ( 15 Oct 2021 13:10 )    Color: Colorless / Appearance: Clear / S.008 / pH: x  Gluc: x / Ketone: Negative  / Bili: Negative / Urobili: Negative   Blood: x / Protein: Negative / Nitrite: Negative   Leuk Esterase: Negative / RBC: 1 /hpf / WBC 0 /HPF   Sq Epi: x / Non Sq Epi: 0 /hpf / Bacteria: Negative          RADIOLOGY & ADDITIONAL TESTS:  Results Reviewed: Y  Imaging Personally Reviewed: Y  Electrocardiogram Personally Reviewed: Y    COORDINATION OF CARE:  Care Discussed with Consultants/Other Providers [Y/N]: Y  Prior or Outpatient Records Reviewed [Y/N]: Y

## 2021-10-18 ENCOUNTER — TRANSCRIPTION ENCOUNTER (OUTPATIENT)
Age: 86
End: 2021-10-18

## 2021-10-18 LAB
ANION GAP SERPL CALC-SCNC: 20 MMOL/L — HIGH (ref 5–17)
APTT BLD: 36 SEC — HIGH (ref 27.5–35.5)
BLD GP AB SCN SERPL QL: NEGATIVE — SIGNIFICANT CHANGE UP
BUN SERPL-MCNC: 14 MG/DL — SIGNIFICANT CHANGE UP (ref 7–23)
CALCIUM SERPL-MCNC: 9.9 MG/DL — SIGNIFICANT CHANGE UP (ref 8.4–10.5)
CHLORIDE SERPL-SCNC: 103 MMOL/L — SIGNIFICANT CHANGE UP (ref 96–108)
CO2 SERPL-SCNC: 21 MMOL/L — LOW (ref 22–31)
CREAT SERPL-MCNC: 0.8 MG/DL — SIGNIFICANT CHANGE UP (ref 0.5–1.3)
GLUCOSE SERPL-MCNC: 96 MG/DL — SIGNIFICANT CHANGE UP (ref 70–99)
HCT VFR BLD CALC: 39.9 % — SIGNIFICANT CHANGE UP (ref 39–50)
HGB BLD-MCNC: 12.6 G/DL — LOW (ref 13–17)
INR BLD: 1.1 RATIO — SIGNIFICANT CHANGE UP (ref 0.88–1.16)
MAGNESIUM SERPL-MCNC: 1.9 MG/DL — SIGNIFICANT CHANGE UP (ref 1.6–2.6)
MCHC RBC-ENTMCNC: 30.5 PG — SIGNIFICANT CHANGE UP (ref 27–34)
MCHC RBC-ENTMCNC: 31.6 GM/DL — LOW (ref 32–36)
MCV RBC AUTO: 96.6 FL — SIGNIFICANT CHANGE UP (ref 80–100)
NRBC # BLD: 0 /100 WBCS — SIGNIFICANT CHANGE UP (ref 0–0)
PHOSPHATE SERPL-MCNC: 3.5 MG/DL — SIGNIFICANT CHANGE UP (ref 2.5–4.5)
PLATELET # BLD AUTO: 253 K/UL — SIGNIFICANT CHANGE UP (ref 150–400)
POTASSIUM SERPL-MCNC: 3.9 MMOL/L — SIGNIFICANT CHANGE UP (ref 3.5–5.3)
POTASSIUM SERPL-SCNC: 3.9 MMOL/L — SIGNIFICANT CHANGE UP (ref 3.5–5.3)
PROTHROM AB SERPL-ACNC: 13.1 SEC — SIGNIFICANT CHANGE UP (ref 10.6–13.6)
RBC # BLD: 4.13 M/UL — LOW (ref 4.2–5.8)
RBC # FLD: 13 % — SIGNIFICANT CHANGE UP (ref 10.3–14.5)
RH IG SCN BLD-IMP: POSITIVE — SIGNIFICANT CHANGE UP
SODIUM SERPL-SCNC: 144 MMOL/L — SIGNIFICANT CHANGE UP (ref 135–145)
WBC # BLD: 11.24 K/UL — HIGH (ref 3.8–10.5)
WBC # FLD AUTO: 11.24 K/UL — HIGH (ref 3.8–10.5)

## 2021-10-18 PROCEDURE — 45378 DIAGNOSTIC COLONOSCOPY: CPT | Mod: GC

## 2021-10-18 PROCEDURE — 99232 SBSQ HOSP IP/OBS MODERATE 35: CPT | Mod: GC

## 2021-10-18 RX ORDER — PANTOPRAZOLE SODIUM 20 MG/1
40 TABLET, DELAYED RELEASE ORAL
Refills: 0 | Status: DISCONTINUED | OUTPATIENT
Start: 2021-10-18 | End: 2021-10-19

## 2021-10-18 RX ORDER — SODIUM CHLORIDE 9 MG/ML
500 INJECTION INTRAMUSCULAR; INTRAVENOUS; SUBCUTANEOUS
Refills: 0 | Status: DISCONTINUED | OUTPATIENT
Start: 2021-10-18 | End: 2021-10-19

## 2021-10-18 RX ADMIN — BUDESONIDE AND FORMOTEROL FUMARATE DIHYDRATE 2 PUFF(S): 160; 4.5 AEROSOL RESPIRATORY (INHALATION) at 17:22

## 2021-10-18 RX ADMIN — BUDESONIDE AND FORMOTEROL FUMARATE DIHYDRATE 2 PUFF(S): 160; 4.5 AEROSOL RESPIRATORY (INHALATION) at 05:49

## 2021-10-18 RX ADMIN — CARVEDILOL PHOSPHATE 6.25 MILLIGRAM(S): 80 CAPSULE, EXTENDED RELEASE ORAL at 18:27

## 2021-10-18 RX ADMIN — CARVEDILOL PHOSPHATE 6.25 MILLIGRAM(S): 80 CAPSULE, EXTENDED RELEASE ORAL at 05:13

## 2021-10-18 RX ADMIN — ATORVASTATIN CALCIUM 40 MILLIGRAM(S): 80 TABLET, FILM COATED ORAL at 21:23

## 2021-10-18 RX ADMIN — AMLODIPINE BESYLATE 10 MILLIGRAM(S): 2.5 TABLET ORAL at 05:13

## 2021-10-18 RX ADMIN — LISINOPRIL 20 MILLIGRAM(S): 2.5 TABLET ORAL at 05:13

## 2021-10-18 RX ADMIN — SENNA PLUS 2 TABLET(S): 8.6 TABLET ORAL at 21:23

## 2021-10-18 NOTE — DIETITIAN INITIAL EVALUATION ADULT. - PROBLEM SELECTOR PLAN 1
One episode in the morning with passage of blood clots, painless. second episode with blood clots in ED. Uses low dose ASA. Hx of constipation. Initial Hgb 12. Differentials for LGIB include diverticular bleed vs internal hemorrhoids vs AVMs)  - Constipation: BM every other day with straining  - trend CBC for HgB q12hr. If HgB continues to downtrend with additional ep of hematochezia, will revaluate role for intervention   -active type and screen. Transfuse if Hgb <8  -start Miralax BID PRN  - GI on board

## 2021-10-18 NOTE — DIETITIAN INITIAL EVALUATION ADULT. - SIGNS/SYMPTOMS
as evidenced by inadequate oral intake, wt loss, muscle and fat losses as evidenced by BMI 16.8 kg/m2

## 2021-10-18 NOTE — DISCHARGE NOTE PROVIDER - NSDCCPTREATMENT_GEN_ALL_CORE_FT
PRINCIPAL PROCEDURE  Procedure: Colonoscopy  Findings and Treatment: A colonoscopy is an exam used to detect changes or abnormalities in the large intestine (colon) and rectum.  During a colonoscopy, a long, flexible tube (colonoscope) is inserted into the rectum. A tiny video camera at the tip of the tube allows the doctor to view the inside of the entire colon.  You had a colonoscopy done in order to determine the source of the blood in your stool. The results of your colonoscopy show that you have diverticulosis which is likely what caused the bleeding. At the time of colonoscopy the divertocilosis was not actively bleeding.   Please follow up with your primary care doctor to manage this condition.

## 2021-10-18 NOTE — DISCHARGE NOTE PROVIDER - NSDCMRMEDTOKEN_GEN_ALL_CORE_FT
ALBUTEROL HFA 90 MCG INHALER:   ATORVASTATIN 40 MG TABLET:   CARVEDILOL 6.25 MG TABLET:   FELODIPINE ER 10 MG TABLET:   INCRUSE ELLIPTA 62.5 MCG INH:   LISINOPRIL 20 MG TABLET:    ALBUTEROL HFA 90 MCG INHALER:   ATORVASTATIN 40 MG TABLET:   CARVEDILOL 6.25 MG TABLET:   FELODIPINE ER 10 MG TABLET:   INCRUSE ELLIPTA 62.5 MCG INH:   LISINOPRIL 20 MG TABLET:   polyethylene glycol 3350 oral powder for reconstitution: 17 gram(s) orally 2 times a day, As needed, Constipation   ALBUTEROL HFA 90 MCG INHALER: 1 puff(s) inhaled every 6 hours, As Needed  ATORVASTATIN 40 MG TABLET: 1 tab(s) orally once a day  CARVEDILOL 6.25 MG TABLET: 1 tab(s) orally 2 times a day  FELODIPINE ER 10 MG TABLET: 1 tab(s) orally once a day  INCRUSE ELLIPTA 62.5 MCG INH: 1 puff(s) inhaled 2 times a day  LISINOPRIL 20 MG TABLET: 1 tab(s) orally once a day  polyethylene glycol 3350 oral powder for reconstitution: 17 gram(s) orally 2 times a day, As needed, Constipation

## 2021-10-18 NOTE — PROGRESS NOTE ADULT - SUBJECTIVE AND OBJECTIVE BOX
PROGRESS NOTE:     Patient is a 86y old  Male who presents with a chief complaint of Hematochezia with clots (16 Oct 2021 07:26)      SUBJECTIVE / OVERNIGHT EVENTS: pt reports drinking 2 jugs of prep and is ready for colonoscopy today. Pt seen and examined. No acute overnight events.    MEDICATIONS  (STANDING):  amLODIPine   Tablet 10 milliGRAM(s) Oral daily  atorvastatin 40 milliGRAM(s) Oral at bedtime  budesonide  80 MICROgram(s)/formoterol 4.5 MICROgram(s) Inhaler 2 Puff(s) Inhalation two times a day  carvedilol 6.25 milliGRAM(s) Oral every 12 hours  influenza   Vaccine 0.5 milliLiter(s) IntraMuscular once  lisinopril 20 milliGRAM(s) Oral daily  senna 2 Tablet(s) Oral at bedtime    MEDICATIONS  (PRN):  albuterol/ipratropium for Nebulization 3 milliLiter(s) Nebulizer every 6 hours PRN Shortness of Breath and/or Wheezing  polyethylene glycol 3350 17 Gram(s) Oral two times a day PRN Constipation        CAPILLARY BLOOD GLUCOSE        I&O's Summary      PHYSICAL EXAM:    Vital Signs Last 24 Hrs  T(C): 36.4 (10-18-21 @ 04:57), Max: 36.4 (10-17-21 @ 20:59)  T(F): 97.5 (10-18-21 @ 04:57), Max: 97.5 (10-17-21 @ 20:59)  HR: 69 (10-18-21 @ 06:15) (62 - 76)  BP: 131/66 (10-18-21 @ 06:15) (123/69 - 172/84)  BP(mean): --  RR: 18 (10-18-21 @ 04:57) (18 - 18)  SpO2: 95% (10-18-21 @ 04:57) (95% - 97%)        CONSTITUTIONAL: NAD  RESPIRATORY: Normal respiratory effort; lungs are clear to auscultation bilaterally  CARDIOVASCULAR: Regular rate and rhythm, normal S1 and S2, no murmur/rub/gallop  ABDOMEN: Nontender to palpation, normoactive bowel sounds, no rebound/guarding; No hepatosplenomegaly  EXTREMITIES: No lower extremity edema; Peripheral pulses are 2+ bilaterally  MUSCLOSKELETAL: no clubbing or cyanosis of digits; no joint swelling or tenderness to palpation  PSYCH: A+O to person, place, and time; affect appropriate    LABS:                                              12.6   11.24 )-----------( 253      ( 18 Oct 2021 05:40 )             39.9       10    144  |  103  |  14  ----------------------------<  96  3.9   |  21<L>  |  0.80    Ca    9.9      18 Oct 2021 05:40  Mg     1.9     10-18  Phos  3.5     10-18    PT/INR - ( 18 Oct 2021 05:40 )   PT: 13.1 sec;   INR: 1.10 ratio         PTT - ( 18 Oct 2021 05:40 )  PTT:36.0 sec              Urinalysis Basic - ( 15 Oct 2021 13:10 )    Color: Colorless / Appearance: Clear / S.008 / pH: x  Gluc: x / Ketone: Negative  / Bili: Negative / Urobili: Negative   Blood: x / Protein: Negative / Nitrite: Negative   Leuk Esterase: Negative / RBC: 1 /hpf / WBC 0 /HPF   Sq Epi: x / Non Sq Epi: 0 /hpf / Bacteria: Negative          RADIOLOGY & ADDITIONAL TESTS:  Results Reviewed: Y  Imaging Personally Reviewed: Y  Electrocardiogram Personally Reviewed: Y    COORDINATION OF CARE:  Care Discussed with Consultants/Other Providers [Y/N]: Y  Prior or Outpatient Records Reviewed [Y/N]: Y

## 2021-10-18 NOTE — DIETITIAN INITIAL EVALUATION ADULT. - REASON
pt declined; observed: severe fat loss to orbital and buccal regions, severe muscle loss to clavicle region; some losses likely age-related

## 2021-10-18 NOTE — DIETITIAN INITIAL EVALUATION ADULT. - PROBLEM SELECTOR PLAN 2
ISO 2 episodes of hematochezia with clots. Hgb 12.0 @ 12pm and 11.1 @ 3:32 on 10/15.  -trend CBC for HgB q12hr. If HgB continues to downtrend with additional ep of hematochezia, will revaluate role for intervention   -active type and screen. Transfuse if Hgb <8  - GI on board

## 2021-10-18 NOTE — DIETITIAN NUTRITION RISK NOTIFICATION - TREATMENT: THE FOLLOWING DIET HAS BEEN RECOMMENDED
Diet, DASH/TLC:   Sodium & Cholesterol Restricted  Fiber/Residue Restricted (LOWFIBER)  Supplement Feeding Modality:  Oral  Ensure Clear Cans or Servings Per Day:  2       Frequency:  Daily (10-18-21 @ 16:49) [Pending Verification By Attending]

## 2021-10-18 NOTE — DIETITIAN INITIAL EVALUATION ADULT. - REASON INDICATOR FOR ASSESSMENT
BMI <19. Source: EMR, pt. Pt is an 85 yo male with PMH of COPD, CAD, and recent L inguinal hernia repair (2021) who presented with blood per rectum. Admitted 10/15.

## 2021-10-18 NOTE — DIETITIAN INITIAL EVALUATION ADULT. - OTHER INFO
Pt with limited interest in speaking with RD at this time - awaiting meal tray as NPO earlier today for EGD/Colonoscopy - escalated. Last documented BM yesterday. Pt denies difficulties chewing or swallowing. Confirmed NKFA.     Pt reports progressive weight loss in unknown time frame. Reports  pounds. Dosing weight 110.4 pounds suggests ~35% wt loss in unknown time frame. Will continue to monitor and trend weights as able.     Encouraged PO intake as tolerated and provided brief low fiber diet education. Limited diet education able to be provided as pt not interested. Pt amenable to recommendations. Made aware RD remains available.

## 2021-10-18 NOTE — PRE-ANESTHESIA EVALUATION ADULT - NSANTHOSAYNRD_GEN_A_CORE
No. CLAUDY screening performed.  STOP BANG Legend: 0-2 = LOW Risk; 3-4 = INTERMEDIATE Risk; 5-8 = HIGH Risk

## 2021-10-18 NOTE — DISCHARGE NOTE PROVIDER - CARE PROVIDER_API CALL
Phu Pepe)  Gastroenterology; Internal Medicine  [address] [place] [address]  Phone: [phone]  Fax: [phone]  Follow Up Time:     LEO JAI  Internal Medicine  [address] [place] [id number]  Phone: [phone]  Fax: [phone]  Follow Up Time: 1 week

## 2021-10-18 NOTE — DISCHARGE NOTE PROVIDER - DETAILS OF MALNUTRITION DIAGNOSIS/DIAGNOSES
This patient has been assessed with a concern for Malnutrition and was treated during this hospitalization for the following Nutrition diagnosis/diagnoses:     -  10/18/2021: Mild protein-calorie malnutrition   -  10/18/2021: Underweight (BMI < 19)

## 2021-10-18 NOTE — DISCHARGE NOTE PROVIDER - HOSPITAL COURSE
86 y.o M with hx of COPD (no home O2), CAD, left inguinal hernia repair presents with 1 day of BRBPR with Hgb 12 likely in setting of LGIB (diverticular vs internal hemorrhoids vs AVM). Pt had another episode of hematochezia in the ED with passage of clots and lightheadedness during the episode. Pt hemoglobin remained hemodynamically stable with hb 12.6 on 10/18. Pt was initially not agreeable to colonoscopy but on 10/18 colonoscopy was completed. Medically stable for discharge. 86 y.o M with hx of COPD (no home O2), CAD, left inguinal hernia repair presents with 1 day of BRBPR with Hgb 12 likely in setting of LGIB (diverticular vs internal hemorrhoids vs AVM). Pt had another episode of hematochezia in the ED with passage of clots and lightheadedness during the episode. Pt hemoglobin remained hemodynamically stable with hb 12.6 on 10/18. Pt was initially not agreeable to colonoscopy but on 10/18 colonoscopy shows diverticulosis, no active bleed. Patient declined EGD. Medically stable for discharge.

## 2021-10-18 NOTE — DISCHARGE NOTE PROVIDER - CARE PROVIDERS DIRECT ADDRESSES
[email or web address]

## 2021-10-18 NOTE — DIETITIAN INITIAL EVALUATION ADULT. - ETIOLOGY
related to prolonged inadequate protein-energy intake related to likely inadequate protein-energy intake with increased nutrient needs in setting of COPD

## 2021-10-18 NOTE — DISCHARGE NOTE PROVIDER - NSDCCPCAREPLAN_GEN_ALL_CORE_FT
PRINCIPAL DISCHARGE DIAGNOSIS  Diagnosis: Hematochezia  Assessment and Plan of Treatment: Blood in the stool is usually a sign of bleeding somewhere in the digestive tract. Sometimes the amount of blood is so small that it can only be detected by a fecal occult test (which checks for hidden blood in the stool). At other times it may be visible in the toilet or on toilet paper as bright red blood.  In adults, most common causes are hemorrhoids and diverticulosis, both of which are relatively benign; however, it can also be caused by colorectal cancer, which is potentially fatal.  You had an endoscopy that showed diverticulosis which was most likely the source of your bleeding. You will need to follow up with your GI doctor. If you experience severe bleeding and dizziness or feelings of fainting please call 911.      SECONDARY DISCHARGE DIAGNOSES  Diagnosis: Acute blood loss anemia  Assessment and Plan of Treatment: Anemia is a condition in which you lack enough healthy red blood cells to carry adequate oxygen to your body's tissues. Having anemia, also referred to as low hemoglobin, can make you feel tired and weak. There are many causes of anemia and in your case it was likely due to the blood you lost in your bowel. Please folow up with your PCP and GI doctor for your anemia.  Anemia signs and symptoms vary depending on the cause and severity of anemia. Depending on the causes of your anemia, you might have no symptoms. Symptoms can include lightheadedness, weakness, dizziness, fainting, irregular heart beats. If you experience persistant symptoms of anemia please call 911.     PRINCIPAL DISCHARGE DIAGNOSIS  Diagnosis: Hematochezia  Assessment and Plan of Treatment: Blood in the stool is usually a sign of bleeding somewhere in the digestive tract. Sometimes the amount of blood is so small that it can only be detected by a fecal occult test (which checks for hidden blood in the stool). At other times it may be visible in the toilet or on toilet paper as bright red blood.  In adults, most common causes are hemorrhoids and diverticulosis, both of which are relatively benign; however, it can also be caused by colorectal cancer, which is potentially fatal.  You had an endoscopy that showed diverticulosis which was most likely the source of your bleeding. You will need to follow up with your Primary care doctor. If you experience severe bleeding and dizziness or feelings of fainting please call 911.      SECONDARY DISCHARGE DIAGNOSES  Diagnosis: Acute blood loss anemia  Assessment and Plan of Treatment: Anemia is a condition in which you lack enough healthy red blood cells to carry adequate oxygen to your body's tissues. Having anemia, also referred to as low hemoglobin, can make you feel tired and weak. There are many causes of anemia and in your case it was likely due to the blood you lost in your bowel. Please folow up with your PCP and GI doctor for your anemia.  Anemia signs and symptoms vary depending on the cause and severity of anemia. Depending on the causes of your anemia, you might have no symptoms. Symptoms can include lightheadedness, weakness, dizziness, fainting, irregular heart beats. If you experience persistant symptoms of anemia please call 911.     PRINCIPAL DISCHARGE DIAGNOSIS  Diagnosis: Hematochezia  Assessment and Plan of Treatment: Blood in the stool is usually a sign of bleeding somewhere in the digestive tract. Sometimes the amount of blood is so small that it can only be detected by a fecal occult test (which checks for hidden blood in the stool). At other times it may be visible in the toilet or on toilet paper as bright red blood.  In adults, most common causes are hemorrhoids and diverticulosis, both of which are relatively benign; however, it can also be caused by colorectal cancer, which is potentially fatal.  You had an endoscopy that showed diverticulosis which was most likely the source of your bleeding. You will need to follow up with your Primary care doctor. If you experience severe bleeding and dizziness or feelings of fainting please call 911.      SECONDARY DISCHARGE DIAGNOSES  Diagnosis: Acute blood loss anemia  Assessment and Plan of Treatment: Anemia is a condition in which you lack enough healthy red blood cells to carry adequate oxygen to your body's tissues. Having anemia, also referred to as low hemoglobin, can make you feel tired and weak. There are many causes of anemia and in your case it was likely due to the blood you lost in your bowel. Please folow up with your PCP and GI doctor for your anemia.  Anemia signs and symptoms vary depending on the cause and severity of anemia. Depending on the causes of your anemia, you might have no symptoms. Symptoms can include lightheadedness, weakness, dizziness, fainting, irregular heart beats. If you experience persistant symptoms of anemia please call 911.    Diagnosis: Dysphagia  Assessment and Plan of Treatment: You reported occasional dysphagia symptom that is chronic. You don't have any respiratory symptoms and keep your food down. Please follow-up with gastroenterology doctor outpatient to see if you would benefit from an endoscopy.

## 2021-10-18 NOTE — DIETITIAN INITIAL EVALUATION ADULT. - ORAL INTAKE PTA/DIET HISTORY
Pt reports "normal" appetite and PO intake PTA. Denies following any dietary restrictions at home. Per pt, taking multivitamin, vitamins C, D, and E, fish oil supplements PTA.

## 2021-10-18 NOTE — PHYSICAL THERAPY INITIAL EVALUATION ADULT - PERTINENT HX OF CURRENT PROBLEM, REHAB EVAL
86yoM with hx of COPD, CAD, recent left inguinal hernia repair 2021 p/w blood per rectum. Today morning, pt had urge to have BM but passed bright red blood with blood clots into the toilet over the course of 3 hours. He felt lightheaded and dizzy not denies syncope. No prior hx of GIB in the past. Recently, he had a left inguinal hernia surgery 1-2 months ago and developed constipation, 1 BM every other day with straining.

## 2021-10-18 NOTE — PHYSICAL THERAPY INITIAL EVALUATION ADULT - PRECAUTIONS/LIMITATIONS, REHAB EVAL
He took otc bowel regimen and had softer stools but he recently discontinued it. Prior to today, he denies blood while wiping and denies pain with BM. He takes a daily baby ASA per his cardiologist for years. Intermittently takes Motrin for pain at surgical site. No other NSAID use or steriods. Last colonscopy many years ago, and unable to recall results. No prior EGD. He took otc bowel regimen and had softer stools but he recently discontinued it. Prior to today, he denies blood while wiping and denies pain with BM. He takes a daily baby ASA per his cardiologist for years. Intermittently takes Motrin for pain at surgical site. No other NSAID use or steriods. Last colonscopy many years ago, and unable to recall results. No prior EGD./no known precautions/limitations

## 2021-10-18 NOTE — PRE PROCEDURE NOTE - PRE PROCEDURE EVALUATION
Attending Physician:     Dr. Pepe                       Procedure:   EGD/Colonoscopy    Indication for Procedure:   GIB  ________________________________________________________  PAST MEDICAL & SURGICAL HISTORY:    HTN (hypertension)  COPD mixed type    No significant past surgical history      ALLERGIES:  No Known Allergies    HOME MEDICATIONS:  ALBUTEROL HFA 90 MCG INHALER:   ATORVASTATIN 40 MG TABLET:   CARVEDILOL 6.25 MG TABLET:   FELODIPINE ER 10 MG TABLET:   INCRUSE ELLIPTA 62.5 MCG INH:   LISINOPRIL 20 MG TABLET:     AICD/PPM: [ ] yes   [ X] no    PERTINENT LAB DATA:                        12.6   11.24 )-----------( 253      ( 18 Oct 2021 05:40 )             39.9     10-18    144  |  103  |  14  ----------------------------<  96  3.9   |  21<L>  |  0.80    Ca    9.9      18 Oct 2021 05:40  Phos  3.5     10-18  Mg     1.9     10-18  PT/INR - ( 18 Oct 2021 05:40 )   PT: 13.1 sec;   INR: 1.10 ratio    PTT - ( 18 Oct 2021 05:40 )  PTT:36.0 sec    PHYSICAL EXAMINATION:    T(C): 36.4  HR: 69  BP: 131/66  RR: 18  SpO2: 95%    Constitutional: NAD    Neck:  No JVD  Respiratory: CTAB/L  Cardiovascular: S1 and S2  Gastrointestinal: BS+, soft, NT/ND  Extremities: No peripheral edema  Neurological: A/O x 4      COMMENTS:    The patient is a suitable candidate for the planned procedure unless box checked [ ]  No, explain:

## 2021-10-19 ENCOUNTER — TRANSCRIPTION ENCOUNTER (OUTPATIENT)
Age: 86
End: 2021-10-19

## 2021-10-19 VITALS
SYSTOLIC BLOOD PRESSURE: 142 MMHG | OXYGEN SATURATION: 98 % | HEART RATE: 76 BPM | RESPIRATION RATE: 18 BRPM | TEMPERATURE: 98 F | DIASTOLIC BLOOD PRESSURE: 72 MMHG

## 2021-10-19 PROCEDURE — 99232 SBSQ HOSP IP/OBS MODERATE 35: CPT | Mod: GC

## 2021-10-19 PROCEDURE — 99239 HOSP IP/OBS DSCHRG MGMT >30: CPT

## 2021-10-19 RX ORDER — LISINOPRIL 2.5 MG/1
0 TABLET ORAL
Qty: 0 | Refills: 0 | DISCHARGE

## 2021-10-19 RX ORDER — ALBUTEROL 90 UG/1
0 AEROSOL, METERED ORAL
Qty: 0 | Refills: 0 | DISCHARGE

## 2021-10-19 RX ORDER — UMECLIDINIUM 62.5 UG/1
0 AEROSOL, POWDER ORAL
Qty: 0 | Refills: 0 | DISCHARGE

## 2021-10-19 RX ORDER — FELODIPINE 5 MG/1
0 TABLET, FILM COATED, EXTENDED RELEASE ORAL
Qty: 0 | Refills: 0 | DISCHARGE

## 2021-10-19 RX ORDER — POLYETHYLENE GLYCOL 3350 17 G/17G
17 POWDER, FOR SOLUTION ORAL
Qty: 1020 | Refills: 0
Start: 2021-10-19 | End: 2021-11-17

## 2021-10-19 RX ORDER — CARVEDILOL PHOSPHATE 80 MG/1
0 CAPSULE, EXTENDED RELEASE ORAL
Qty: 0 | Refills: 0 | DISCHARGE

## 2021-10-19 RX ORDER — FUROSEMIDE 40 MG
20 TABLET ORAL ONCE
Refills: 0 | Status: DISCONTINUED | OUTPATIENT
Start: 2021-10-19 | End: 2021-10-19

## 2021-10-19 RX ORDER — ATORVASTATIN CALCIUM 80 MG/1
0 TABLET, FILM COATED ORAL
Qty: 0 | Refills: 0 | DISCHARGE

## 2021-10-19 RX ADMIN — PANTOPRAZOLE SODIUM 40 MILLIGRAM(S): 20 TABLET, DELAYED RELEASE ORAL at 05:16

## 2021-10-19 RX ADMIN — CARVEDILOL PHOSPHATE 6.25 MILLIGRAM(S): 80 CAPSULE, EXTENDED RELEASE ORAL at 05:16

## 2021-10-19 RX ADMIN — BUDESONIDE AND FORMOTEROL FUMARATE DIHYDRATE 2 PUFF(S): 160; 4.5 AEROSOL RESPIRATORY (INHALATION) at 06:11

## 2021-10-19 RX ADMIN — AMLODIPINE BESYLATE 10 MILLIGRAM(S): 2.5 TABLET ORAL at 05:16

## 2021-10-19 RX ADMIN — LISINOPRIL 20 MILLIGRAM(S): 2.5 TABLET ORAL at 05:16

## 2021-10-19 NOTE — PROGRESS NOTE ADULT - PROBLEM SELECTOR PLAN 2
ISO 2 episodes of hematochezia with clots. Hgb 12.0 @ 12pm and 11.1 @ 3:32 on 10/15. stable @12.6 on 10/18.   -trend CBC for HgB q12hr. If HgB continues to downtrend with additional ep of hematochezia, will revaluate role for intervention   - Colonoscopy 10/18 with evidence od hematochezia  -active type and screen. Transfuse if Hgb <8  - GI on board
ISO 2 episodes of hematochezia with clots. Hgb 12.0 @ 12pm and 11.1 @ 3:32 on 10/15. stable @12.6 on 10/18.   -trend CBC for HgB q12hr. If HgB continues to downtrend with additional ep of hematochezia, will revaluate role for intervention   -active type and screen. Transfuse if Hgb <8  - GI on board
ISO 2 episodes of hematochezia with clots. Hgb 12.0 @ 12pm and 11.1 @ 3:32 on 10/15. stable @11.4 on 10/16.   -trend CBC for HgB q12hr. If HgB continues to downtrend with additional ep of hematochezia, will revaluate role for intervention   -active type and screen. Transfuse if Hgb <8  - GI on board
ISO 2 episodes of hematochezia with clots. Hgb 12.0 @ 12pm and 11.1 @ 3:32 on 10/15. stable @11.4 on 10/16.   -trend CBC for HgB q12hr. If HgB continues to downtrend with additional ep of hematochezia, will revaluate role for intervention   -active type and screen. Transfuse if Hgb <8  - GI on board

## 2021-10-19 NOTE — PROGRESS NOTE ADULT - SUBJECTIVE AND OBJECTIVE BOX
Chief Complaint:  Patient is a 86y old  Male who presents with a chief complaint of Hematochezia with clots (19 Oct 2021 07:26)    Interval Events:   No bleeding events overnight.   Tolerating diet.  AM Hgb pending.     Hospital Medications:  albuterol/ipratropium for Nebulization 3 milliLiter(s) Nebulizer every 6 hours PRN  amLODIPine   Tablet 10 milliGRAM(s) Oral daily  atorvastatin 40 milliGRAM(s) Oral at bedtime  budesonide  80 MICROgram(s)/formoterol 4.5 MICROgram(s) Inhaler 2 Puff(s) Inhalation two times a day  carvedilol 6.25 milliGRAM(s) Oral every 12 hours  influenza   Vaccine 0.5 milliLiter(s) IntraMuscular once  lisinopril 20 milliGRAM(s) Oral daily  pantoprazole    Tablet 40 milliGRAM(s) Oral before breakfast  polyethylene glycol 3350 17 Gram(s) Oral two times a day PRN  senna 2 Tablet(s) Oral at bedtime      ROS:   Complete and normal except as mentioned above.    PHYSICAL EXAM:   Vital Signs:  Vital Signs Last 24 Hrs  T(C): 36.6 (19 Oct 2021 05:25), Max: 36.6 (18 Oct 2021 11:10)  T(F): 97.9 (19 Oct 2021 05:25), Max: 97.9 (19 Oct 2021 05:25)  HR: 64 (19 Oct 2021 06:49) (58 - 75)  BP: 121/54 (19 Oct 2021 05:25) (107/64 - 168/71)  BP(mean): 73 (18 Oct 2021 11:10) (73 - 73)  RR: 18 (19 Oct 2021 05:25) (15 - 19)  SpO2: 96% (19 Oct 2021 06:49) (96% - 100%)  Daily Height in cm: 172.72 (18 Oct 2021 11:10)    Daily     GENERAL: no acute distress  NEURO: alert  HEENT: anicteric sclera, no conjunctival pallor appreciated  CHEST: no respiratory distress, no accessory muscle use  CARDIAC: regular rate, rhythm  ABDOMEN: soft, non-tender, non-distended, no rebound or guarding  EXTREMITIES: warm, well perfused, no edema  SKIN: no lesions noted    LABS: reviewed                        12.6 11.24 )-----------( 253      ( 18 Oct 2021 05:40 )             39.9     10-18    144  |  103  |  14  ----------------------------<  96  3.9   |  21<L>  |  0.80    Ca    9.9      18 Oct 2021 05:40  Phos  3.5     10-18  Mg     1.9     10-18          Interval Diagnostic Studies: see sunrise for full report   Chief Complaint:  Patient is a 86y old  Male who presents with a chief complaint of Hematochezia with clots (19 Oct 2021 07:26)    Interval Events:   No bleeding events overnight.   Tolerating diet.  No repeat labs this AM. Per patient going home today    Hospital Medications:  albuterol/ipratropium for Nebulization 3 milliLiter(s) Nebulizer every 6 hours PRN  amLODIPine   Tablet 10 milliGRAM(s) Oral daily  atorvastatin 40 milliGRAM(s) Oral at bedtime  budesonide  80 MICROgram(s)/formoterol 4.5 MICROgram(s) Inhaler 2 Puff(s) Inhalation two times a day  carvedilol 6.25 milliGRAM(s) Oral every 12 hours  influenza   Vaccine 0.5 milliLiter(s) IntraMuscular once  lisinopril 20 milliGRAM(s) Oral daily  pantoprazole    Tablet 40 milliGRAM(s) Oral before breakfast  polyethylene glycol 3350 17 Gram(s) Oral two times a day PRN  senna 2 Tablet(s) Oral at bedtime      ROS:   Complete and normal except as mentioned above.    PHYSICAL EXAM:   Vital Signs:  Vital Signs Last 24 Hrs  T(C): 36.6 (19 Oct 2021 05:25), Max: 36.6 (18 Oct 2021 11:10)  T(F): 97.9 (19 Oct 2021 05:25), Max: 97.9 (19 Oct 2021 05:25)  HR: 64 (19 Oct 2021 06:49) (58 - 75)  BP: 121/54 (19 Oct 2021 05:25) (107/64 - 168/71)  BP(mean): 73 (18 Oct 2021 11:10) (73 - 73)  RR: 18 (19 Oct 2021 05:25) (15 - 19)  SpO2: 96% (19 Oct 2021 06:49) (96% - 100%)  Daily Height in cm: 172.72 (18 Oct 2021 11:10)    Daily     GENERAL: no acute distress  NEURO: alert  HEENT: anicteric sclera, no conjunctival pallor appreciated  CHEST: no respiratory distress, no accessory muscle use  CARDIAC: regular rate, rhythm  ABDOMEN: soft, non-tender, non-distended, no rebound or guarding  EXTREMITIES: warm, well perfused, no edema  SKIN: no lesions noted    LABS: reviewed                        12.6   11.24 )-----------( 253      ( 18 Oct 2021 05:40 )             39.9     10-18    144  |  103  |  14  ----------------------------<  96  3.9   |  21<L>  |  0.80    Ca    9.9      18 Oct 2021 05:40  Phos  3.5     10-18  Mg     1.9     10-18          Interval Diagnostic Studies: see sunrise for full report

## 2021-10-19 NOTE — PROGRESS NOTE ADULT - ATTENDING COMMENTS
Agree with above. No objection for discharge as H/H stable and bleeding from likely diverticular bleeding has resolved. Recommend high fiber diet as outpatient indefinitely.
86 y/o male with the above history who presented with hematochezia. ddx includes diverticular vs AVM. H/H overall stable this am. Will continue to trend CBC. GI consulted, is hesitant to pursue colonoscopy. Monitor BP, may need to uptitrate antihypertensives.
86M here w/ acute blood loss anemia likely due to LGIB  For colonoscopy today  Likely dc planning tomorrow if hgb stable
86M w/ acute blood loss anemia due to suspected diverticular bleed. Hgb is now stable  Discussed return precautions including severe abd pain, recurrent bleeding, intractable n/v  He will follow up GI as outpatient  Medically stable for discharge today  35min spent coordinating care and planning for discharge
88 y/o male with the above history who presented with hematochezia. ddx includes diverticular vs AVM. H/H overall stable this am. Will continue to trend CBC. GI consulted, is hesitant to pursue colonoscopy, now amenable. Plan for c-scope tomorrow. Clears today, NPO after midnight. Monitor BP, may need to uptitrate antihypertensives

## 2021-10-19 NOTE — PROGRESS NOTE ADULT - PROBLEM SELECTOR PLAN 1
One episode in the morning with passage of blood clots, painless. second episode with blood clots in ED. Uses low dose ASA. Hx of constipation. Initial Hgb 12 on 10/15, 11.4 on 10/16. Differentials for LGIB include diverticular bleed vs internal hemorrhoids vs AVMs). Stable  - Constipation: BM every other day with straining  - trend CBC for HgB q12hr. If HgB continues to downtrend with additional ep of hematochezia, will revaluate role for intervention   -active type and screen. Transfuse if Hgb <8  -start Miralax BID PRN  - GI on board
One episode in the morning with passage of blood clots, painless. second episode with blood clots in ED. Uses low dose ASA. Hx of constipation. Initial Hgb 12 on 10/15, 11.4 on 10/16. Differentials for LGIB include diverticular bleed vs internal hemorrhoids vs AVMs). Stable  - Constipation: BM every other day with straining  - trend CBC for HgB. If HgB continues to downtrend with additional ep of hematochezia, will revaluate role for intervention   -active type and screen. Transfuse if Hgb <8  -start Miralax BID PRN  - Colonoscopy 10/18 with evidence od hematochezia
One episode in the morning with passage of blood clots, painless. second episode with blood clots in ED. Uses low dose ASA. Hx of constipation. Initial Hgb 12 on 10/15, 11.4 on 10/16. Differentials for LGIB include diverticular bleed vs internal hemorrhoids vs AVMs). Stable  - Constipation: BM every other day with straining  - trend CBC for HgB q12hr. If HgB continues to downtrend with additional ep of hematochezia, will revaluate role for intervention   -active type and screen. Transfuse if Hgb <8  -start Miralax BID PRN  - GI on board
One episode in the morning with passage of blood clots, painless. second episode with blood clots in ED. Uses low dose ASA. Hx of constipation. Initial Hgb 12 on 10/15, 11.4 on 10/16. Differentials for LGIB include diverticular bleed vs internal hemorrhoids vs AVMs). Stable  - Constipation: BM every other day with straining  - trend CBC for HgB. If HgB continues to downtrend with additional ep of hematochezia, will revaluate role for intervention   -active type and screen. Transfuse if Hgb <8  -start Miralax BID PRN  - Colonoscopy today 10/18

## 2021-10-19 NOTE — DISCHARGE NOTE NURSING/CASE MANAGEMENT/SOCIAL WORK - PATIENT PORTAL LINK FT
You can access the FollowMyHealth Patient Portal offered by [facility] by registering at the following website: [email or web address]. By joining [email or web address]’s FollowMyHealth portal, you will also be able to view your health information using other applications (apps) compatible with our system.

## 2021-10-19 NOTE — PROGRESS NOTE ADULT - REASON FOR ADMISSION
Hematochezia with clots

## 2021-10-19 NOTE — PROGRESS NOTE ADULT - PROBLEM SELECTOR PLAN 5
dvt prophylaxis scd's  Mirilax for constipation

## 2021-10-19 NOTE — PROGRESS NOTE ADULT - SUBJECTIVE AND OBJECTIVE BOX
PROGRESS NOTE:     Patient is a 86y old  Male who presents with a chief complaint of Hematochezia with clots (16 Oct 2021 07:26)      SUBJECTIVE / OVERNIGHT EVENTS: No acute overnight events.    MEDICATIONS  (STANDING):  amLODIPine   Tablet 10 milliGRAM(s) Oral daily  atorvastatin 40 milliGRAM(s) Oral at bedtime  budesonide  80 MICROgram(s)/formoterol 4.5 MICROgram(s) Inhaler 2 Puff(s) Inhalation two times a day  carvedilol 6.25 milliGRAM(s) Oral every 12 hours  influenza   Vaccine 0.5 milliLiter(s) IntraMuscular once  lisinopril 20 milliGRAM(s) Oral daily  pantoprazole    Tablet 40 milliGRAM(s) Oral before breakfast  senna 2 Tablet(s) Oral at bedtime    MEDICATIONS  (PRN):  albuterol/ipratropium for Nebulization 3 milliLiter(s) Nebulizer every 6 hours PRN Shortness of Breath and/or Wheezing  polyethylene glycol 3350 17 Gram(s) Oral two times a day PRN Constipation        CAPILLARY BLOOD GLUCOSE      I&O's Summary      PHYSICAL EXAM:    Vital Signs Last 24 Hrs  T(C): 36.6 (19 Oct 2021 05:25), Max: 36.6 (18 Oct 2021 09:40)  T(F): 97.9 (19 Oct 2021 05:25), Max: 97.9 (19 Oct 2021 05:25)  HR: 64 (19 Oct 2021 06:49) (58 - 75)  BP: 121/54 (19 Oct 2021 05:25) (107/64 - 168/71)  BP(mean): 73 (18 Oct 2021 11:10) (73 - 73)  RR: 18 (19 Oct 2021 05:25) (15 - 19)  SpO2: 96% (19 Oct 2021 06:49) (96% - 100%)        CONSTITUTIONAL: NAD  RESPIRATORY: Normal respiratory effort; lungs are clear to auscultation bilaterally  CARDIOVASCULAR: Regular rate and rhythm, normal S1 and S2, no murmur/rub/gallop  ABDOMEN: Nontender to palpation, normoactive bowel sounds, no rebound/guarding; No hepatosplenomegaly  EXTREMITIES: No lower extremity edema; Peripheral pulses are 2+ bilaterally  MUSCLOSKELETAL: no clubbing or cyanosis of digits; no joint swelling or tenderness to palpation  PSYCH: A+O to person, place, and time; affect appropriate    LABS:                                                              12.6   11.24 )-----------( 253      ( 18 Oct 2021 05:40 )             39.9     10-18    144  |  103  |  14  ----------------------------<  96  3.9   |  21<L>  |  0.80    Ca    9.9      18 Oct 2021 05:40  Mg     1.9     1018  Phos  3.5     10-18    PT/INR - ( 18 Oct 2021 05:40 )   PT: 13.1 sec;   INR: 1.10 ratio         PTT - ( 18 Oct 2021 05:40 )  PTT:36.0 sec              Urinalysis Basic - ( 15 Oct 2021 13:10 )    Color: Colorless / Appearance: Clear / S.008 / pH: x  Gluc: x / Ketone: Negative  / Bili: Negative / Urobili: Negative   Blood: x / Protein: Negative / Nitrite: Negative   Leuk Esterase: Negative / RBC: 1 /hpf / WBC 0 /HPF   Sq Epi: x / Non Sq Epi: 0 /hpf / Bacteria: Negative          RADIOLOGY & ADDITIONAL TESTS:  Results Reviewed: Y  Imaging Personally Reviewed: Y  Electrocardiogram Personally Reviewed: Y    COORDINATION OF CARE:  Care Discussed with Consultants/Other Providers [Y/N]: Y  Prior or Outpatient Records Reviewed [Y/N]: Y

## 2021-10-19 NOTE — PROGRESS NOTE ADULT - PROBLEM SELECTOR PLAN 3
- carvedilol 6.25mg  - lisinopril 20mg  - Amlodipine 2.5mg and uptitrate  - Hydralazine PRN for BP > 180
- carvedilol 6.25mg  - lisinopril 20mg  - Amlodipine 2.5mg -> 10 mg   - Hydralazine PRN for BP > 180
- carvedilol 6.25mg  - lisinopril 20mg  - Amlodipine 2.5mg and uptitrate  - Hydralazine PRN for BP > 180
- carvedilol 6.25mg  - lisinopril 20mg  - Amlodipine 2.5mg -> 10 mg   - Hydralazine PRN for BP > 180

## 2021-10-19 NOTE — PROGRESS NOTE ADULT - ASSESSMENT
86 y.o M with hx of COPD (no home O2), CAD, left inguinal hernia repair presents with 1 day of BRBPR with Hgb 12 likely in setting of LGIB (diverticular vs internal hemorrhoids vs AVM). Patient hemodynamically stable.
87yo M with hx of COPD (no home O2), CAD, left inguinal hernia repair presents with 1 day of BRBPR with Hgb 12 likely in setting of LGIB (diverticular vs internal hemorrhoids vs AVM)    #Hematochezia likely 2/2 diverticulosis, colonoscopy with non bleeding diverticulosis, likely cause of bleeding, patient declined EGD. No further intervention from GI at this moment.  #Constipation: BM every other day with straining    Recommendations:  -no further intervention warranted at this time; patient declined EGD and no additional bleeding events  -c/w Miralax BID PRN  -c/w high fiber diet    Thank you for involving us in this patient's care. Signing off.     Melani Mancilla MD  Gastroenterology/Hepatology Fellow, PGY-V    NON-URGENT CONSULTS:  Please email [email or web address]  AT NIGHT AND ON WEEKENDS:  Contact on-call GI fellow via answering service ([phone]) from 5pm-8am and on weekends/holidays  MONDAY-FRIDAY 8AM-5PM:  Pager# [phone] ([facility])  GI Phone# [phone] ([facility])
86 y.o M with hx of COPD (no home O2), CAD, left inguinal hernia repair presents with 1 day of BRBPR with Hgb 12 likely in setting of LGIB (diverticular vs internal hemorrhoids vs AVM). Patient hemodynamically stable.
86 y.o M with hx of COPD (no home O2), CAD, left inguinal hernia repair presents with 1 day of BRBPR with Hgb 12 likely in setting of LGIB (diverticular vs internal hemorrhoids vs AVM). Pt completed his prep for colonoscopy scheduled for 10/18.  hemodynamically stable.
86 y.o M with hx of COPD (no home O2), CAD, left inguinal hernia repair presents with 1 day of BRBPR with Hgb 12 likely in setting of LGIB (diverticular vs internal hemorrhoids vs AVM). Pt completed his prep for colonoscopy scheduled for 10/18.  hemodynamically stable.

## 2021-10-26 PROCEDURE — U0005: CPT

## 2021-10-26 PROCEDURE — 86901 BLOOD TYPING SEROLOGIC RH(D): CPT

## 2021-10-26 PROCEDURE — 81001 URINALYSIS AUTO W/SCOPE: CPT

## 2021-10-26 PROCEDURE — 85025 COMPLETE CBC W/AUTO DIFF WBC: CPT

## 2021-10-26 PROCEDURE — 94640 AIRWAY INHALATION TREATMENT: CPT

## 2021-10-26 PROCEDURE — 85730 THROMBOPLASTIN TIME PARTIAL: CPT

## 2021-10-26 PROCEDURE — 80053 COMPREHEN METABOLIC PANEL: CPT

## 2021-10-26 PROCEDURE — 85610 PROTHROMBIN TIME: CPT

## 2021-10-26 PROCEDURE — 86769 SARS-COV-2 COVID-19 ANTIBODY: CPT

## 2021-10-26 PROCEDURE — 97161 PT EVAL LOW COMPLEX 20 MIN: CPT

## 2021-10-26 PROCEDURE — 99285 EMERGENCY DEPT VISIT HI MDM: CPT

## 2021-10-26 PROCEDURE — 36415 COLL VENOUS BLD VENIPUNCTURE: CPT

## 2021-10-26 PROCEDURE — 86850 RBC ANTIBODY SCREEN: CPT

## 2021-10-26 PROCEDURE — 84100 ASSAY OF PHOSPHORUS: CPT

## 2021-10-26 PROCEDURE — 86900 BLOOD TYPING SEROLOGIC ABO: CPT

## 2021-10-26 PROCEDURE — 83735 ASSAY OF MAGNESIUM: CPT

## 2021-10-26 PROCEDURE — 80048 BASIC METABOLIC PNL TOTAL CA: CPT

## 2021-10-26 PROCEDURE — U0003: CPT

## 2021-10-26 PROCEDURE — 85027 COMPLETE CBC AUTOMATED: CPT

## 2021-12-14 PROBLEM — Z00.00 ENCOUNTER FOR PREVENTIVE HEALTH EXAMINATION: Status: ACTIVE | Noted: 2021-12-14

## 2022-07-07 ENCOUNTER — INPATIENT (INPATIENT)
Facility: HOSPITAL | Age: 87
LOS: 7 days | Discharge: HOME HEALTH | DRG: 208 | End: 2022-07-15
Attending: HOSPITALIST | Admitting: INTERNAL MEDICINE
Payer: MEDICARE

## 2022-07-07 VITALS
SYSTOLIC BLOOD PRESSURE: 192 MMHG | OXYGEN SATURATION: 99 % | TEMPERATURE: 95 F | RESPIRATION RATE: 30 BRPM | DIASTOLIC BLOOD PRESSURE: 81 MMHG | WEIGHT: 115.08 LBS | HEIGHT: 68 IN | HEART RATE: 70 BPM

## 2022-07-07 DIAGNOSIS — J44.1 CHRONIC OBSTRUCTIVE PULMONARY DISEASE WITH (ACUTE) EXACERBATION: ICD-10-CM

## 2022-07-07 PROBLEM — J44.9 CHRONIC OBSTRUCTIVE PULMONARY DISEASE, UNSPECIFIED: Chronic | Status: ACTIVE | Noted: 2021-10-15

## 2022-07-07 PROBLEM — I10 ESSENTIAL (PRIMARY) HYPERTENSION: Chronic | Status: ACTIVE | Noted: 2021-10-15

## 2022-07-07 LAB
ALBUMIN SERPL ELPH-MCNC: 4 G/DL — SIGNIFICANT CHANGE UP (ref 3.3–5)
ALBUMIN SERPL ELPH-MCNC: 4.2 G/DL — SIGNIFICANT CHANGE UP (ref 3.3–5)
ALP SERPL-CCNC: 55 U/L — SIGNIFICANT CHANGE UP (ref 40–120)
ALP SERPL-CCNC: 64 U/L — SIGNIFICANT CHANGE UP (ref 40–120)
ALT FLD-CCNC: 17 U/L — SIGNIFICANT CHANGE UP (ref 10–45)
ALT FLD-CCNC: 18 U/L — SIGNIFICANT CHANGE UP (ref 10–45)
ANION GAP SERPL CALC-SCNC: 13 MMOL/L — SIGNIFICANT CHANGE UP (ref 5–17)
ANION GAP SERPL CALC-SCNC: 9 MMOL/L — SIGNIFICANT CHANGE UP (ref 5–17)
APPEARANCE UR: CLEAR — SIGNIFICANT CHANGE UP
AST SERPL-CCNC: 25 U/L — SIGNIFICANT CHANGE UP (ref 10–40)
AST SERPL-CCNC: 29 U/L — SIGNIFICANT CHANGE UP (ref 10–40)
BACTERIA # UR AUTO: NEGATIVE — SIGNIFICANT CHANGE UP
BASE EXCESS BLDV CALC-SCNC: 8.2 MMOL/L — HIGH (ref -2–2)
BASE EXCESS BLDV CALC-SCNC: 8.4 MMOL/L — HIGH (ref -2–2)
BASE EXCESS BLDV CALC-SCNC: 8.5 MMOL/L — HIGH (ref -2–2)
BASOPHILS # BLD AUTO: 0.06 K/UL — SIGNIFICANT CHANGE UP (ref 0–0.2)
BASOPHILS NFR BLD AUTO: 0.7 % — SIGNIFICANT CHANGE UP (ref 0–2)
BILIRUB SERPL-MCNC: 0.7 MG/DL — SIGNIFICANT CHANGE UP (ref 0.2–1.2)
BILIRUB SERPL-MCNC: 1.1 MG/DL — SIGNIFICANT CHANGE UP (ref 0.2–1.2)
BILIRUB UR-MCNC: NEGATIVE — SIGNIFICANT CHANGE UP
BLOOD GAS VENOUS - CREATININE: SIGNIFICANT CHANGE UP MG/DL (ref 0.5–1.3)
BUN SERPL-MCNC: 20 MG/DL — SIGNIFICANT CHANGE UP (ref 7–23)
BUN SERPL-MCNC: 21 MG/DL — SIGNIFICANT CHANGE UP (ref 7–23)
CA-I SERPL-SCNC: 1.16 MMOL/L — SIGNIFICANT CHANGE UP (ref 1.15–1.33)
CA-I SERPL-SCNC: 1.27 MMOL/L — SIGNIFICANT CHANGE UP (ref 1.15–1.33)
CA-I SERPL-SCNC: 1.29 MMOL/L — SIGNIFICANT CHANGE UP (ref 1.15–1.33)
CALCIUM SERPL-MCNC: 9.2 MG/DL — SIGNIFICANT CHANGE UP (ref 8.4–10.5)
CALCIUM SERPL-MCNC: 9.6 MG/DL — SIGNIFICANT CHANGE UP (ref 8.4–10.5)
CHLORIDE BLDV-SCNC: 92 MMOL/L — LOW (ref 96–108)
CHLORIDE BLDV-SCNC: 92 MMOL/L — LOW (ref 96–108)
CHLORIDE BLDV-SCNC: 93 MMOL/L — LOW (ref 96–108)
CHLORIDE SERPL-SCNC: 93 MMOL/L — LOW (ref 96–108)
CHLORIDE SERPL-SCNC: 95 MMOL/L — LOW (ref 96–108)
CO2 BLDV-SCNC: 37 MMOL/L — HIGH (ref 22–26)
CO2 BLDV-SCNC: 44 MMOL/L — HIGH (ref 22–26)
CO2 BLDV-SCNC: 44 MMOL/L — HIGH (ref 22–26)
CO2 SERPL-SCNC: 28 MMOL/L — SIGNIFICANT CHANGE UP (ref 22–31)
CO2 SERPL-SCNC: 31 MMOL/L — SIGNIFICANT CHANGE UP (ref 22–31)
COD CRY URNS QL: ABNORMAL
COLOR SPEC: YELLOW — SIGNIFICANT CHANGE UP
CREAT SERPL-MCNC: 0.73 MG/DL — SIGNIFICANT CHANGE UP (ref 0.5–1.3)
CREAT SERPL-MCNC: 0.78 MG/DL — SIGNIFICANT CHANGE UP (ref 0.5–1.3)
DIFF PNL FLD: NEGATIVE — SIGNIFICANT CHANGE UP
EGFR: 87 ML/MIN/1.73M2 — SIGNIFICANT CHANGE UP
EGFR: 89 ML/MIN/1.73M2 — SIGNIFICANT CHANGE UP
EOSINOPHIL # BLD AUTO: 0.84 K/UL — HIGH (ref 0–0.5)
EOSINOPHIL NFR BLD AUTO: 9.3 % — HIGH (ref 0–6)
EPI CELLS # UR: 5 /HPF — SIGNIFICANT CHANGE UP
GAS PNL BLDA: SIGNIFICANT CHANGE UP
GAS PNL BLDV: 129 MMOL/L — LOW (ref 136–145)
GAS PNL BLDV: 129 MMOL/L — LOW (ref 136–145)
GAS PNL BLDV: 130 MMOL/L — LOW (ref 136–145)
GAS PNL BLDV: SIGNIFICANT CHANGE UP
GLUCOSE BLDC GLUCOMTR-MCNC: 140 MG/DL — HIGH (ref 70–99)
GLUCOSE BLDV-MCNC: 140 MG/DL — HIGH (ref 70–99)
GLUCOSE BLDV-MCNC: 152 MG/DL — HIGH (ref 70–99)
GLUCOSE BLDV-MCNC: 176 MG/DL — HIGH (ref 70–99)
GLUCOSE SERPL-MCNC: 136 MG/DL — HIGH (ref 70–99)
GLUCOSE SERPL-MCNC: 159 MG/DL — HIGH (ref 70–99)
GLUCOSE UR QL: NEGATIVE — SIGNIFICANT CHANGE UP
HCO3 BLDV-SCNC: 35 MMOL/L — HIGH (ref 22–29)
HCO3 BLDV-SCNC: 40 MMOL/L — HIGH (ref 22–29)
HCO3 BLDV-SCNC: 41 MMOL/L — HIGH (ref 22–29)
HCT VFR BLD CALC: 40.4 % — SIGNIFICANT CHANGE UP (ref 39–50)
HCT VFR BLD CALC: 45.5 % — SIGNIFICANT CHANGE UP (ref 39–50)
HCT VFR BLDA CALC: 37 % — LOW (ref 39–51)
HCT VFR BLDA CALC: 43 % — SIGNIFICANT CHANGE UP (ref 39–51)
HCT VFR BLDA CALC: 46 % — SIGNIFICANT CHANGE UP (ref 39–51)
HGB BLD CALC-MCNC: 12.4 G/DL — LOW (ref 12.6–17.4)
HGB BLD CALC-MCNC: 14.3 G/DL — SIGNIFICANT CHANGE UP (ref 12.6–17.4)
HGB BLD CALC-MCNC: 15.2 G/DL — SIGNIFICANT CHANGE UP (ref 12.6–17.4)
HGB BLD-MCNC: 13.7 G/DL — SIGNIFICANT CHANGE UP (ref 13–17)
HGB BLD-MCNC: 15.3 G/DL — SIGNIFICANT CHANGE UP (ref 13–17)
HYALINE CASTS # UR AUTO: 6 /LPF — HIGH (ref 0–2)
IMM GRANULOCYTES NFR BLD AUTO: 0.2 % — SIGNIFICANT CHANGE UP (ref 0–1.5)
KETONES UR-MCNC: ABNORMAL
LACTATE BLDV-MCNC: 0.7 MMOL/L — SIGNIFICANT CHANGE UP (ref 0.7–2)
LACTATE BLDV-MCNC: 0.9 MMOL/L — SIGNIFICANT CHANGE UP (ref 0.7–2)
LACTATE BLDV-MCNC: 1.2 MMOL/L — SIGNIFICANT CHANGE UP (ref 0.7–2)
LEUKOCYTE ESTERASE UR-ACNC: NEGATIVE — SIGNIFICANT CHANGE UP
LYMPHOCYTES # BLD AUTO: 2.29 K/UL — SIGNIFICANT CHANGE UP (ref 1–3.3)
LYMPHOCYTES # BLD AUTO: 25.2 % — SIGNIFICANT CHANGE UP (ref 13–44)
MAGNESIUM SERPL-MCNC: 1.5 MG/DL — LOW (ref 1.6–2.6)
MCHC RBC-ENTMCNC: 31.5 PG — SIGNIFICANT CHANGE UP (ref 27–34)
MCHC RBC-ENTMCNC: 31.7 PG — SIGNIFICANT CHANGE UP (ref 27–34)
MCHC RBC-ENTMCNC: 33.6 GM/DL — SIGNIFICANT CHANGE UP (ref 32–36)
MCHC RBC-ENTMCNC: 33.9 GM/DL — SIGNIFICANT CHANGE UP (ref 32–36)
MCV RBC AUTO: 92.9 FL — SIGNIFICANT CHANGE UP (ref 80–100)
MCV RBC AUTO: 94.4 FL — SIGNIFICANT CHANGE UP (ref 80–100)
MONOCYTES # BLD AUTO: 0.68 K/UL — SIGNIFICANT CHANGE UP (ref 0–0.9)
MONOCYTES NFR BLD AUTO: 7.5 % — SIGNIFICANT CHANGE UP (ref 2–14)
NEUTROPHILS # BLD AUTO: 5.19 K/UL — SIGNIFICANT CHANGE UP (ref 1.8–7.4)
NEUTROPHILS NFR BLD AUTO: 57.1 % — SIGNIFICANT CHANGE UP (ref 43–77)
NITRITE UR-MCNC: NEGATIVE — SIGNIFICANT CHANGE UP
NRBC # BLD: 0 /100 WBCS — SIGNIFICANT CHANGE UP (ref 0–0)
NRBC # BLD: 0 /100 WBCS — SIGNIFICANT CHANGE UP (ref 0–0)
NT-PROBNP SERPL-SCNC: 216 PG/ML — SIGNIFICANT CHANGE UP (ref 0–300)
PCO2 BLDV: 103 MMHG — HIGH (ref 42–55)
PCO2 BLDV: 108 MMHG — HIGH (ref 42–55)
PCO2 BLDV: 57 MMHG — HIGH (ref 42–55)
PH BLDV: 7.18 — CRITICAL LOW (ref 7.32–7.43)
PH BLDV: 7.21 — LOW (ref 7.32–7.43)
PH BLDV: 7.4 — SIGNIFICANT CHANGE UP (ref 7.32–7.43)
PH UR: 6 — SIGNIFICANT CHANGE UP (ref 5–8)
PHOSPHATE SERPL-MCNC: 3.7 MG/DL — SIGNIFICANT CHANGE UP (ref 2.5–4.5)
PLATELET # BLD AUTO: 168 K/UL — SIGNIFICANT CHANGE UP (ref 150–400)
PLATELET # BLD AUTO: 195 K/UL — SIGNIFICANT CHANGE UP (ref 150–400)
PO2 BLDV: 48 MMHG — HIGH (ref 25–45)
PO2 BLDV: 51 MMHG — HIGH (ref 25–45)
PO2 BLDV: 55 MMHG — HIGH (ref 25–45)
POTASSIUM BLDV-SCNC: 4.5 MMOL/L — SIGNIFICANT CHANGE UP (ref 3.5–5.1)
POTASSIUM BLDV-SCNC: 5.4 MMOL/L — HIGH (ref 3.5–5.1)
POTASSIUM BLDV-SCNC: 7 MMOL/L — CRITICAL HIGH (ref 3.5–5.1)
POTASSIUM SERPL-MCNC: 4.4 MMOL/L — SIGNIFICANT CHANGE UP (ref 3.5–5.3)
POTASSIUM SERPL-MCNC: 4.6 MMOL/L — SIGNIFICANT CHANGE UP (ref 3.5–5.3)
POTASSIUM SERPL-SCNC: 4.4 MMOL/L — SIGNIFICANT CHANGE UP (ref 3.5–5.3)
POTASSIUM SERPL-SCNC: 4.6 MMOL/L — SIGNIFICANT CHANGE UP (ref 3.5–5.3)
PROT SERPL-MCNC: 6.7 G/DL — SIGNIFICANT CHANGE UP (ref 6–8.3)
PROT SERPL-MCNC: 7.4 G/DL — SIGNIFICANT CHANGE UP (ref 6–8.3)
PROT UR-MCNC: ABNORMAL
RAPID RVP RESULT: SIGNIFICANT CHANGE UP
RBC # BLD: 4.35 M/UL — SIGNIFICANT CHANGE UP (ref 4.2–5.8)
RBC # BLD: 4.82 M/UL — SIGNIFICANT CHANGE UP (ref 4.2–5.8)
RBC # FLD: 13.2 % — SIGNIFICANT CHANGE UP (ref 10.3–14.5)
RBC # FLD: 13.2 % — SIGNIFICANT CHANGE UP (ref 10.3–14.5)
RBC CASTS # UR COMP ASSIST: 2 /HPF — SIGNIFICANT CHANGE UP (ref 0–4)
SAO2 % BLDV: 77.6 % — SIGNIFICANT CHANGE UP (ref 67–88)
SAO2 % BLDV: 81.2 % — SIGNIFICANT CHANGE UP (ref 67–88)
SAO2 % BLDV: 82.8 % — SIGNIFICANT CHANGE UP (ref 67–88)
SARS-COV-2 RNA SPEC QL NAA+PROBE: SIGNIFICANT CHANGE UP
SODIUM SERPL-SCNC: 133 MMOL/L — LOW (ref 135–145)
SODIUM SERPL-SCNC: 136 MMOL/L — SIGNIFICANT CHANGE UP (ref 135–145)
SP GR SPEC: 1.02 — SIGNIFICANT CHANGE UP (ref 1.01–1.02)
TROPONIN T, HIGH SENSITIVITY RESULT: 14 NG/L — SIGNIFICANT CHANGE UP (ref 0–51)
UROBILINOGEN FLD QL: ABNORMAL
WBC # BLD: 6.65 K/UL — SIGNIFICANT CHANGE UP (ref 3.8–10.5)
WBC # BLD: 9.08 K/UL — SIGNIFICANT CHANGE UP (ref 3.8–10.5)
WBC # FLD AUTO: 6.65 K/UL — SIGNIFICANT CHANGE UP (ref 3.8–10.5)
WBC # FLD AUTO: 9.08 K/UL — SIGNIFICANT CHANGE UP (ref 3.8–10.5)
WBC UR QL: 2 /HPF — SIGNIFICANT CHANGE UP (ref 0–5)

## 2022-07-07 PROCEDURE — 93308 TTE F-UP OR LMTD: CPT | Mod: 26,GC

## 2022-07-07 PROCEDURE — 99291 CRITICAL CARE FIRST HOUR: CPT

## 2022-07-07 PROCEDURE — 76604 US EXAM CHEST: CPT | Mod: 26,GC

## 2022-07-07 PROCEDURE — 99291 CRITICAL CARE FIRST HOUR: CPT | Mod: FT,25

## 2022-07-07 PROCEDURE — 93010 ELECTROCARDIOGRAM REPORT: CPT | Mod: NC,59

## 2022-07-07 PROCEDURE — 71045 X-RAY EXAM CHEST 1 VIEW: CPT | Mod: 26,76

## 2022-07-07 PROCEDURE — 93971 EXTREMITY STUDY: CPT | Mod: 26,GC

## 2022-07-07 PROCEDURE — 31500 INSERT EMERGENCY AIRWAY: CPT

## 2022-07-07 PROCEDURE — 93308 TTE F-UP OR LMTD: CPT | Mod: 26

## 2022-07-07 RX ORDER — IPRATROPIUM/ALBUTEROL SULFATE 18-103MCG
3 AEROSOL WITH ADAPTER (GRAM) INHALATION ONCE
Refills: 0 | Status: COMPLETED | OUTPATIENT
Start: 2022-07-07 | End: 2022-07-07

## 2022-07-07 RX ORDER — IPRATROPIUM/ALBUTEROL SULFATE 18-103MCG
3 AEROSOL WITH ADAPTER (GRAM) INHALATION EVERY 6 HOURS
Refills: 0 | Status: DISCONTINUED | OUTPATIENT
Start: 2022-07-07 | End: 2022-07-15

## 2022-07-07 RX ORDER — AMLODIPINE BESYLATE 2.5 MG/1
10 TABLET ORAL DAILY
Refills: 0 | Status: DISCONTINUED | OUTPATIENT
Start: 2022-07-07 | End: 2022-07-08

## 2022-07-07 RX ORDER — AZITHROMYCIN 500 MG/1
500 TABLET, FILM COATED ORAL ONCE
Refills: 0 | Status: COMPLETED | OUTPATIENT
Start: 2022-07-07 | End: 2022-07-07

## 2022-07-07 RX ORDER — AZITHROMYCIN 500 MG/1
500 TABLET, FILM COATED ORAL EVERY 24 HOURS
Refills: 0 | Status: COMPLETED | OUTPATIENT
Start: 2022-07-07 | End: 2022-07-09

## 2022-07-07 RX ORDER — PROPOFOL 10 MG/ML
20 INJECTION, EMULSION INTRAVENOUS
Qty: 1000 | Refills: 0 | Status: DISCONTINUED | OUTPATIENT
Start: 2022-07-07 | End: 2022-07-08

## 2022-07-07 RX ORDER — INSULIN LISPRO 100/ML
VIAL (ML) SUBCUTANEOUS EVERY 6 HOURS
Refills: 0 | Status: DISCONTINUED | OUTPATIENT
Start: 2022-07-07 | End: 2022-07-10

## 2022-07-07 RX ORDER — ETOMIDATE 2 MG/ML
20 INJECTION INTRAVENOUS ONCE
Refills: 0 | Status: COMPLETED | OUTPATIENT
Start: 2022-07-07 | End: 2022-07-07

## 2022-07-07 RX ORDER — PANTOPRAZOLE SODIUM 20 MG/1
40 TABLET, DELAYED RELEASE ORAL DAILY
Refills: 0 | Status: DISCONTINUED | OUTPATIENT
Start: 2022-07-07 | End: 2022-07-08

## 2022-07-07 RX ORDER — MAGNESIUM SULFATE 500 MG/ML
1 VIAL (ML) INJECTION ONCE
Refills: 0 | Status: COMPLETED | OUTPATIENT
Start: 2022-07-07 | End: 2022-07-07

## 2022-07-07 RX ORDER — SODIUM CHLORIDE 9 MG/ML
1000 INJECTION, SOLUTION INTRAVENOUS
Refills: 0 | Status: DISCONTINUED | OUTPATIENT
Start: 2022-07-07 | End: 2022-07-08

## 2022-07-07 RX ORDER — ROCURONIUM BROMIDE 10 MG/ML
70 VIAL (ML) INTRAVENOUS ONCE
Refills: 0 | Status: COMPLETED | OUTPATIENT
Start: 2022-07-07 | End: 2022-07-07

## 2022-07-07 RX ORDER — BUDESONIDE, MICRONIZED 100 %
0.5 POWDER (GRAM) MISCELLANEOUS EVERY 12 HOURS
Refills: 0 | Status: DISCONTINUED | OUTPATIENT
Start: 2022-07-07 | End: 2022-07-15

## 2022-07-07 RX ORDER — CHLORHEXIDINE GLUCONATE 213 G/1000ML
1 SOLUTION TOPICAL
Refills: 0 | Status: DISCONTINUED | OUTPATIENT
Start: 2022-07-07 | End: 2022-07-08

## 2022-07-07 RX ORDER — ENOXAPARIN SODIUM 100 MG/ML
40 INJECTION SUBCUTANEOUS EVERY 24 HOURS
Refills: 0 | Status: DISCONTINUED | OUTPATIENT
Start: 2022-07-07 | End: 2022-07-15

## 2022-07-07 RX ORDER — ATORVASTATIN CALCIUM 80 MG/1
40 TABLET, FILM COATED ORAL AT BEDTIME
Refills: 0 | Status: DISCONTINUED | OUTPATIENT
Start: 2022-07-07 | End: 2022-07-15

## 2022-07-07 RX ORDER — ASPIRIN/CALCIUM CARB/MAGNESIUM 324 MG
81 TABLET ORAL DAILY
Refills: 0 | Status: DISCONTINUED | OUTPATIENT
Start: 2022-07-07 | End: 2022-07-15

## 2022-07-07 RX ORDER — AZITHROMYCIN 500 MG/1
500 TABLET, FILM COATED ORAL EVERY 24 HOURS
Refills: 0 | Status: DISCONTINUED | OUTPATIENT
Start: 2022-07-07 | End: 2022-07-07

## 2022-07-07 RX ORDER — ALBUTEROL 90 UG/1
2 AEROSOL, METERED ORAL EVERY 6 HOURS
Refills: 0 | Status: DISCONTINUED | OUTPATIENT
Start: 2022-07-07 | End: 2022-07-15

## 2022-07-07 RX ORDER — CHLORHEXIDINE GLUCONATE 213 G/1000ML
15 SOLUTION TOPICAL EVERY 12 HOURS
Refills: 0 | Status: DISCONTINUED | OUTPATIENT
Start: 2022-07-07 | End: 2022-07-08

## 2022-07-07 RX ADMIN — Medication 70 MILLIGRAM(S): at 10:44

## 2022-07-07 RX ADMIN — AZITHROMYCIN 255 MILLIGRAM(S): 500 TABLET, FILM COATED ORAL at 17:58

## 2022-07-07 RX ADMIN — Medication 3 MILLILITER(S): at 06:28

## 2022-07-07 RX ADMIN — ETOMIDATE 20 MILLIGRAM(S): 2 INJECTION INTRAVENOUS at 10:45

## 2022-07-07 RX ADMIN — Medication 81 MILLIGRAM(S): at 21:41

## 2022-07-07 RX ADMIN — SODIUM CHLORIDE 100 MILLILITER(S): 9 INJECTION, SOLUTION INTRAVENOUS at 17:58

## 2022-07-07 RX ADMIN — Medication 125 MILLIGRAM(S): at 06:23

## 2022-07-07 RX ADMIN — PROPOFOL 6.26 MICROGRAM(S)/KG/MIN: 10 INJECTION, EMULSION INTRAVENOUS at 11:16

## 2022-07-07 RX ADMIN — ENOXAPARIN SODIUM 40 MILLIGRAM(S): 100 INJECTION SUBCUTANEOUS at 16:25

## 2022-07-07 RX ADMIN — Medication 3 MILLILITER(S): at 06:23

## 2022-07-07 RX ADMIN — AZITHROMYCIN 255 MILLIGRAM(S): 500 TABLET, FILM COATED ORAL at 06:38

## 2022-07-07 RX ADMIN — Medication 100 GRAM(S): at 16:25

## 2022-07-07 RX ADMIN — ATORVASTATIN CALCIUM 40 MILLIGRAM(S): 80 TABLET, FILM COATED ORAL at 21:42

## 2022-07-07 RX ADMIN — Medication 3 MILLILITER(S): at 17:01

## 2022-07-07 RX ADMIN — Medication 40 MILLIGRAM(S): at 17:58

## 2022-07-07 RX ADMIN — CHLORHEXIDINE GLUCONATE 15 MILLILITER(S): 213 SOLUTION TOPICAL at 18:13

## 2022-07-07 RX ADMIN — Medication 0.5 MILLIGRAM(S): at 17:01

## 2022-07-07 NOTE — ED ADULT NURSE REASSESSMENT NOTE - NS ED NURSE REASSESS COMMENT FT1
1045 RS medications prepared as per MD. Pt given paralytic & sedative medication prior to intubation by MD. Pt intubated using _7.5_ size ETtube, _21_ at the lip. Positive color change noted. Bilateral breath sounds & equal chest rise & fall. Capnography within normal limits. X-ray at bedside to confirm location. Respiratory therapist at bedside to connect patient to ventilator. will keep monitor. Lozano catheter placed using sterile technique. Second RN present to confirm sterility. Draining to gravity. Secured w/ stat lock. Pt tolerated procedure well. Will cont to monitor.
Report received from RN. Simeon ZENG, pt on bipap, resp nonlabored, skin warm/dry, resting comfortably in bed with family at bedside., at this time Pt denies headache, dizziness, chest pain, palpitations, SOB, abd pain, n/v/d, urinary symptoms, fevers, chills, weakness at this time. Pt awaiting for results . Safety maintained

## 2022-07-07 NOTE — H&P ADULT - NSHPPHYSICALEXAM_GEN_ALL_CORE
OBJECTIVE:  ICU Vital Signs Last 24 Hrs  T(C): 35 (07 Jul 2022 06:06), Max: 35 (07 Jul 2022 06:06)  T(F): 95 (07 Jul 2022 06:06), Max: 95 (07 Jul 2022 06:06)  HR: 70 (07 Jul 2022 11:05) (46 - 94)  BP: 102/73 (07 Jul 2022 10:57) (70/49 - 194/101)  BP(mean): 83 (07 Jul 2022 10:57) (62 - 83)  ABP: --  ABP(mean): --  RR: 18 (07 Jul 2022 10:57) (17 - 30)  SpO2: 100% (07 Jul 2022 11:05) (98% - 100%)    Mode: AC/ CMV (Assist Control/ Continuous Mandatory Ventilation), RR (machine): 18, TV (machine): 450, FiO2: 60, PEEP: 5, ITime: 1    PHYSICAL EXAM:  General: Intubated/sedaed. Alert. Following commands? *****  HEENT: ET tube *****. No ulcerations, epistaxis, or signs of infection. Pupils constricted, ERRL. No scleral icterus or conjunctival pallor.  Neck: No JVD. Supple. No bruising or ecchymosis.  Chest/Lungs: CTAB, no wheezes, rhonchi, or rales. Normal excursion.   Heart: Regular rate and regular rhythm. No murmurs appreciated. *****.   Abdomen: Soft, non tender to palaption. No distension.   Extremities/skin: No significant extremity edema. Hands cool to touch. Cap refill < 2 sec. No unilateral leg swelling ****  Neuro: Following commands. Moving extremities spontaneously.  Psych: Sedated, appropriate mood *****      LINES:   - Location, insertion date, redness, indication?  - Lozano insertion date and last change? VITALS:   T(C): 35 (07-07-22 @ 06:06), Max: 35 (07-07-22 @ 06:06)  HR: 82 (07-07-22 @ 11:19) (46 - 94)  BP: 141/81 (07-07-22 @ 11:19) (70/49 - 194/101)  RR: 20 (07-07-22 @ 11:19) (17 - 30)  SpO2: 100% (07-07-22 @ 11:19) (98% - 100%)    GENERAL: sedated and intubated  HEAD:  Atraumatic, normocephalic  EYES: EOMI, PERRLA, conjunctiva and sclera clear  ENT: Moist mucous membranes  NECK: Supple, no JVD  HEART: Regular rate and rhythm, no murmurs, rubs, or gallops  LUNGS: sedated and intubated w/ decreased air movement  ABDOMEN: Soft, nontender, nondistended, +BS  EXTREMITIES: 1+ pitting edema b/l  NERVOUS SYSTEM:  A&Ox3, no focal deficits   SKIN: No rashes or lesions

## 2022-07-07 NOTE — H&P ADULT - ATTENDING COMMENTS
1. 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 Pt has not been home 02 but becomes dyspneic an any type of exertion or ambulation. He has been intubated before. Apparently there has been no recent fever , chills   abnormal sputum or hemoptysis. Presented with PCO2> 100. Currently PCO2 down to 52.  Recommend: 1. Change vent settings to RR16, 450 60L/m square wave and FIO2 30%. Repeat ABG. Continue bronchodilators  and start prednisone 40mg NGT x 5 days.  2 ID. Start azithromycin. Check urine legionella antigen ,RVP.  3. FEN. Start tube feeds.  4. DVT prophylaxis Lovenox  5GOC: Full code

## 2022-07-07 NOTE — ED PROVIDER NOTE - PROGRESS NOTE DETAILS
O'Eden DO PGY-3: received sign out on this patient. O'Eden DO PGY-3: pt appears more tired. Will call pt's son - Geovani to discuss GOC. O'Eden DO PGY-3: called son. No answer. left VM O'Eden DO PGY-3: tried calling son Geovani again no answer O'Eden DO PGY-3: pt more confused. AOx1. Freeman DO PGY-3: spoke w/ son Geovani. I explained the lcinical situation. He gave permission for intubation. Explained that pt will then be going to the MICU O'Eden DO PGY-3: c/s micu

## 2022-07-07 NOTE — ED PROVIDER NOTE - ATTENDING CONTRIBUTION TO CARE
Attending Statement (PAMELA Josue MD):    HPI: 87M h/o COPD, HTN, HLD, p/w worsening SOB; arrives via EMS who reported O2 sat 70% in field; improving s/p duoneb x1 and noted to have diffuse weheezing.  No reported fever; + chronic cough; no leg swelling, no chest pain.    Review of Systems:  deferred-respiratory distress/failure    PSH/PMH as noted above    On Physical Exam:  General: ill appearing, tachypneic / extremis able to speak in short (few word) sentences; pursed lip breathing  HEENT: PERRL, MMM, airway patent  Neck: no JVD  Cardiac: tachycardic s1s2  Lungs: diffuse wheezing and decreased BS  Abdomen: soft nontender/nondistended  : no bladder tenderness or distension  Skin: intact, no rash  Extremities: no peripheral edema, no gross deformities    MDM: Likely COPD exacerbation, eval for pneumonia/infection; check screening labs: cbc (to evaluate for leukocytosis or anemia), CMP (to evaluate for electrolyte abnormalities or renal/liver dysfunction) , trop/probnp; obtain CXR, ecg. Plan for admission for ongoing evaluation/management. Start bipap/avaps for resp failure, giving steroids, duonebs and azithromycin.

## 2022-07-07 NOTE — ED PROCEDURE NOTE - US CPT CODES
35787 US Chest (PTX, Pleural Effussion/CHF vs COPD)/66358 Echocardiography Transthoracic with Image 2D (Echo/FAST)

## 2022-07-07 NOTE — H&P ADULT - NSHPLABSRESULTS_GEN_ALL_CORE
LABS:                        15.3   9.08  )-----------( 195      ( 07 Jul 2022 06:26 )             45.5     Hgb Trend: 15.3<--  07-07    133<L>  |  93<L>  |  20  ----------------------------<  136<H>  4.4   |  31  |  0.78    Ca    9.6      07 Jul 2022 06:26    TPro  7.4  /  Alb  4.2  /  TBili  0.7  /  DBili  x   /  AST  29  /  ALT  17  /  AlkPhos  64  07-07    Creatinine Trend: 0.78<--      Venous Blood Gas:  07-07 @ 08:10  7.18/108/55/40/81.2  VBG Lactate: 0.9  Venous Blood Gas:  07-07 @ 06:22  7.21/103/51/41/77.6  VBG Lactate: 0.7      MICROBIOLOGY:     RADIOLOGY & ADDITIONAL TESTS:

## 2022-07-07 NOTE — ED ADULT NURSE NOTE - EXTENSIONS OF SELF_ADULT
None Unna Boot Text: An Unna boot was placed to help immobilize the limb and facilitate more rapid healing.

## 2022-07-07 NOTE — ED PROVIDER NOTE - OBJECTIVE STATEMENT
87-year-old male with a past medical history of COPD/emphysema,  hypertension, hyperlipidemia presents to the ED with acute shortness of breath had occurred this morning. As per her grandson at bedside, patient has experiencing increased amounts of sputum . This morning he woke up severely short of breath , and EMS was called. Patient at bedside admits to shortness of breath. As per EMS, pulse ox was in the low 80s started on non-rebreather and given 1 l with mild improvement. Arrived to the ED as a notification , immediate intervention was taken.

## 2022-07-07 NOTE — ED PROVIDER NOTE - CLINICAL SUMMARY MEDICAL DECISION MAKING FREE TEXT BOX
87-year-old male with a past medical history of COPD/emphysema,  hypertension, hyperlipidemia presents to the ED with acute shortness of breath had occurred this morning. low pulse ox, immediate intervention taken. other vitals non actionable. Started on AVAPS, 3x nebs, steroids. PE as noted above. the differential diagnoses includes, but not limited to: copd exacerbation vs type 1 vs type 2 respiratory failure. will order labs, imaging, ekg, meds, reassess. full code as per discussion with grandson at this time. will closely reassess.

## 2022-07-07 NOTE — ED PROCEDURE NOTE - ATTENDING CONTRIBUTION TO CARE
I Geovani Hamilton MD discussed the procedure with the resident and or ACP and went over risks and benefits as well as indications for the procedure. I was present for key portions of the procedure itself and assisted as needed.

## 2022-07-07 NOTE — ED PROCEDURE NOTE - PROCEDURE ADDITIONAL DETAILS
POCUS: Emergency Department Focused Ultrasound performed at patient's bedside.  The complete report will be available in PACS.
discussed with relative

## 2022-07-07 NOTE — ED ADULT NURSE NOTE - OBJECTIVE STATEMENT
88 yo M pt PMHx of COPD, HTN, HLD BIBEMS for SOB, tachypnea and hypoxia to 70% RA improved with duo neb. pt placed on NRB with duo nebs then placed on bipap when in room. 88 yo M pt PMHx of COPD, HTN, HLD BIBEMS for SOB, tachypnea and hypoxia to 70% RA improved with duo neb. pt placed on NRB with duo nebs then placed on avap when in room by RT and given Solumedrol.  pt is A&Ox4, MAEW, LS wheezing throughout breathing labored tachypneic with retractions noted in tripod position improved with interventions mentioned above, b/l LE pitting edema +4.   Pt denies headache, dizziness, chest pain, palpitations, abdominal pain, n/v/d, urinary symptoms, fevers, chills, weakness at this time.

## 2022-07-07 NOTE — ED PROVIDER NOTE - PHYSICAL EXAMINATION
GENERAL: ill appearing  HEAD: normocephalic, atraumatic  HEENT: normal conjunctiva, oral mucosa moist, neck supple  CARDIAC: regular rate and rhythm, normal S1 and S2,  no appreciable murmurs    PULM: respiratory distress, breathing with pursed lips, diffuse wheezing    GI: abdomen nondistended, soft, nontender, no guarding or rebound tenderness  : no CVA tenderness, no suprapubic tenderness  NEURO: alert and oriented x 3, normal speech, PERRLA, EOMI, no focal motor or sensory deficits  MSK: no visible deformities, no peripheral edema, calf tenderness/redness/swelling  SKIN: no visible rashes, dry, well-perfused  PSYCH: appropriate mood and affect

## 2022-07-07 NOTE — ED PROVIDER NOTE - CARE PLAN
1 Principal Discharge DX:	COPD with acute exacerbation  Secondary Diagnosis:	Acute respiratory failure with hypercapnia

## 2022-07-07 NOTE — CHART NOTE - NSCHARTNOTEFT_GEN_A_CORE
: Aditya Purvis    INDICATION: Assessment    PROCEDURE:  [xxxx ] LIMITED ECHO  [xxxx ] LIMITED CHEST  [ ] LIMITED RETROPERITONEAL  [ ] LIMITED ABDOMINAL  [xxxx ] LIMITED DVT  [ ] NEEDLE GUIDANCE VASCULAR  [ ] NEEDLE GUIDANCE THORACENTESIS  [ ] NEEDLE GUIDANCE PARACENTESIS  [ ] NEEDLE GUIDANCE PERICARDIOCENTESIS  [ ] OTHER    FINDINGS:  A line predominant anteriorly with few focal B lines in Lt lower lung field. Without pleural effusions or consolidations appreciated    good  LVFx, VTI of 20 appreciated. RV<LV, IVC 1.6    limited DVT study with Rt and Lt common femoral, saphenous, deep femoral veins compressible as well as Rt and Lt popliteal veins compressible    INTERPRETATION:  A line predominant anteriorly with few focal B lines in LLL field, without consolidations or effusions appreciated in setting of COPD exacerbation    Good LVFx    without DVT appreciated in limited study : Real Dela Cruz MD    INDICATION: Respiratory failure    PROCEDURE:  [xxxx ] LIMITED ECHO  [xxxx ] LIMITED CHEST  [ ] LIMITED RETROPERITONEAL  [ ] LIMITED ABDOMINAL  [xxxx ] LIMITED DVT  [ ] NEEDLE GUIDANCE VASCULAR  [ ] NEEDLE GUIDANCE THORACENTESIS  [ ] NEEDLE GUIDANCE PARACENTESIS  [ ] NEEDLE GUIDANCE PERICARDIOCENTESIS  [ ] OTHER    FINDINGS:  A line predominant anteriorly with few focal B lines in Lt lower lung field. Without pleural effusions or consolidations appreciated    Normal LVFx, VTI of 20 appreciated. RV<LV, IVC 1.6    limited DVT study with Rt and Lt common femoral, saphenous, deep femoral veins compressible as well as Rt and Lt popliteal veins compressible    INTERPRETATION:  A line predominant anteriorly with few focal B lines in LLL field, without consolidations or effusions appreciated in setting of COPD exacerbation    Normal LVFx    without DVT appreciated in limited study

## 2022-07-07 NOTE — H&P ADULT - NSHPREVIEWOFSYSTEMS_GEN_ALL_CORE
REVIEW OF SYSTEMS:  Constitutional: No fevers, chills, weight loss, weight gain  HEENT: No vision problems, eye pain, nasal congestion, rhinorrhea, sore throat, dysphagia  CV: No chest pain, orthopnea, palpitations  Resp: No cough, dyspnea, wheezing, hemoptysis  GI: No nausea, vomiting, diarrhea, constipation, abdominal pain  : [ ] dysuria [ ] nocturia [ ] hematuria [ ] increased urinary frequency  Musculoskeletal: [ ] back pain [ ] myalgias [ ] arthralgias [ ] fracture  Skin: [ ] rash [ ] itch  Neurological: [ ] headache [ ] dizziness [ ] syncope [ ] weakness [ ] numbness  Psychiatric: [ ] anxiety [ ] depression  Endocrine: [ ] diabetes [ ] thyroid problem  Hematologic/Lymphatic: [ ] anemia [ ] bleeding problem  Allergic/Immunologic: [ ] itchy eyes [ ] nasal discharge [ ] hives [ ] angioedema  [ ] All other systems negative  [ ] Unable to assess ROS because ***** REVIEW OF SYSTEMS:    [ x] Unable to assess ROS because of being sedated and intubated

## 2022-07-07 NOTE — H&P ADULT - ASSESSMENT
ASSESSMENT AND PLAN:  Mr.    #Neuro  - Sedation? Dose and trend  - Paralytics? Dose and trend  - Perform SAT? - requires not hemodynamically unstable, spo2 >92%, pao2 <75, peep <10, no paralytics w/in 6 hrs or TOF not 4/4    #Cardiovascular  - Pressors: Dose and trend  - BP and HR: trend x 24hrs?    #Respiratory  - SBT? requires RASS < 2?, RR <30, vent rate <35, and SAT  - Vent trends?  - Last abg?    #GI/Nutrition  - Tube feeds? If not, why?   - Ulcer prophylaxis?    #/Renal  - Significant electrolyte abnormalities?  - Urine output  - Standing Lasix? If not, why?    #Skin  - Ulcers?  - Perfusion status?    #ID  - Current abx: Name, indication, start date and end date  - Suputum or blood cultures?  - fever?    #Endocrine  - Glucose trends?  - Current insulin dose    #Hematologic/DVT ppx  - CBC abnormalities?  - DVT Prophylaxis: If not, why?    #Ethics       87M PMH COPD (not on home O2), HTN, HLD, presented to the ED due to COPD exacerbation. Currently pt is sedated and intubated pending MICU transfer.    # NEUROLOGY  - sedated and intubated  - sedation: on propofol now    # CARDIOLOGY  - Hx of HTN, will hold anti-hypertensives iso sedation  - no pressor needed    # PULMONARY  - ventilation status: 14/450/5/50% in the ED  - IV steroid for exacerbation w/ PO transition  - VBG: likely respiratory acidosis due to high CO2  - duoneb    # GASTROINTESTINAL   - NPO for now  - once in MICU will give TF    # RENAL/UROLOGY  - de oliveira: draining clear urine    # INFECTIOUS DISEASE  - WBC: 9  - on abx: none     # ENDOCRINOLOGY  - no active issues    # HEMATOLOGY  - DVT PPX: lovenox    # ETHICS  - FULL CODE

## 2022-07-07 NOTE — H&P ADULT - HISTORY OF PRESENT ILLNESS
MICU Accept Note    CHIEF COMPLAINT:     HPI / INTERVAL HISTORY:           87M PMH COPD (not on home O2), HTN, HLD, presented to the ED due to COPD exacerbation. Granddaughter at bedside provided Hx as pt intubated.    Overnight pt showed increased work of breathing, and despite using albuterol symptoms cannot be controlled. He was brought in by his grandson. Despite putting pt on BiPAP and AVASP pt was still having increased work of breathing and was therefore intubated after being paralyzed.    VBG showed worsening acidosis. CXR clear. Limited ECHO benign. COVID and RVP -ve.

## 2022-07-08 LAB
ALBUMIN SERPL ELPH-MCNC: 3.4 G/DL — SIGNIFICANT CHANGE UP (ref 3.3–5)
ALP SERPL-CCNC: 47 U/L — SIGNIFICANT CHANGE UP (ref 40–120)
ALT FLD-CCNC: 16 U/L — SIGNIFICANT CHANGE UP (ref 10–45)
ANION GAP SERPL CALC-SCNC: 10 MMOL/L — SIGNIFICANT CHANGE UP (ref 5–17)
AST SERPL-CCNC: 27 U/L — SIGNIFICANT CHANGE UP (ref 10–40)
BILIRUB SERPL-MCNC: 1 MG/DL — SIGNIFICANT CHANGE UP (ref 0.2–1.2)
BUN SERPL-MCNC: 21 MG/DL — SIGNIFICANT CHANGE UP (ref 7–23)
CALCIUM SERPL-MCNC: 9.1 MG/DL — SIGNIFICANT CHANGE UP (ref 8.4–10.5)
CHLORIDE SERPL-SCNC: 96 MMOL/L — SIGNIFICANT CHANGE UP (ref 96–108)
CO2 SERPL-SCNC: 26 MMOL/L — SIGNIFICANT CHANGE UP (ref 22–31)
CREAT SERPL-MCNC: 0.68 MG/DL — SIGNIFICANT CHANGE UP (ref 0.5–1.3)
EGFR: 91 ML/MIN/1.73M2 — SIGNIFICANT CHANGE UP
GAS PNL BLDA: SIGNIFICANT CHANGE UP
GLUCOSE BLDC GLUCOMTR-MCNC: 107 MG/DL — HIGH (ref 70–99)
GLUCOSE BLDC GLUCOMTR-MCNC: 141 MG/DL — HIGH (ref 70–99)
GLUCOSE BLDC GLUCOMTR-MCNC: 49 MG/DL — CRITICAL LOW (ref 70–99)
GLUCOSE BLDC GLUCOMTR-MCNC: 60 MG/DL — LOW (ref 70–99)
GLUCOSE BLDC GLUCOMTR-MCNC: 62 MG/DL — LOW (ref 70–99)
GLUCOSE BLDC GLUCOMTR-MCNC: 69 MG/DL — LOW (ref 70–99)
GLUCOSE BLDC GLUCOMTR-MCNC: 76 MG/DL — SIGNIFICANT CHANGE UP (ref 70–99)
GLUCOSE SERPL-MCNC: 122 MG/DL — HIGH (ref 70–99)
GRAM STN FLD: SIGNIFICANT CHANGE UP
HCT VFR BLD CALC: 36.1 % — LOW (ref 39–50)
HGB BLD-MCNC: 12.6 G/DL — LOW (ref 13–17)
MAGNESIUM SERPL-MCNC: 1.7 MG/DL — SIGNIFICANT CHANGE UP (ref 1.6–2.6)
MCHC RBC-ENTMCNC: 31.5 PG — SIGNIFICANT CHANGE UP (ref 27–34)
MCHC RBC-ENTMCNC: 34.9 GM/DL — SIGNIFICANT CHANGE UP (ref 32–36)
MCV RBC AUTO: 90.3 FL — SIGNIFICANT CHANGE UP (ref 80–100)
MRSA PCR RESULT.: SIGNIFICANT CHANGE UP
NRBC # BLD: 0 /100 WBCS — SIGNIFICANT CHANGE UP (ref 0–0)
PHOSPHATE SERPL-MCNC: 3.1 MG/DL — SIGNIFICANT CHANGE UP (ref 2.5–4.5)
PLATELET # BLD AUTO: 148 K/UL — LOW (ref 150–400)
POTASSIUM SERPL-MCNC: 4.4 MMOL/L — SIGNIFICANT CHANGE UP (ref 3.5–5.3)
POTASSIUM SERPL-SCNC: 4.4 MMOL/L — SIGNIFICANT CHANGE UP (ref 3.5–5.3)
PROT SERPL-MCNC: 6 G/DL — SIGNIFICANT CHANGE UP (ref 6–8.3)
RBC # BLD: 4 M/UL — LOW (ref 4.2–5.8)
RBC # FLD: 13.2 % — SIGNIFICANT CHANGE UP (ref 10.3–14.5)
S AUREUS DNA NOSE QL NAA+PROBE: DETECTED
SODIUM SERPL-SCNC: 132 MMOL/L — LOW (ref 135–145)
SPECIMEN SOURCE: SIGNIFICANT CHANGE UP
WBC # BLD: 12.72 K/UL — HIGH (ref 3.8–10.5)
WBC # FLD AUTO: 12.72 K/UL — HIGH (ref 3.8–10.5)

## 2022-07-08 PROCEDURE — 99291 CRITICAL CARE FIRST HOUR: CPT

## 2022-07-08 RX ORDER — AMLODIPINE BESYLATE 2.5 MG/1
10 TABLET ORAL DAILY
Refills: 0 | Status: DISCONTINUED | OUTPATIENT
Start: 2022-07-08 | End: 2022-07-15

## 2022-07-08 RX ORDER — DEXTROSE 50 % IN WATER 50 %
12.5 SYRINGE (ML) INTRAVENOUS ONCE
Refills: 0 | Status: COMPLETED | OUTPATIENT
Start: 2022-07-08 | End: 2022-07-08

## 2022-07-08 RX ADMIN — CHLORHEXIDINE GLUCONATE 1 APPLICATION(S): 213 SOLUTION TOPICAL at 05:14

## 2022-07-08 RX ADMIN — Medication 3 MILLILITER(S): at 06:06

## 2022-07-08 RX ADMIN — Medication 0.5 MILLIGRAM(S): at 17:25

## 2022-07-08 RX ADMIN — AZITHROMYCIN 255 MILLIGRAM(S): 500 TABLET, FILM COATED ORAL at 16:52

## 2022-07-08 RX ADMIN — Medication 0.5 MILLIGRAM(S): at 06:23

## 2022-07-08 RX ADMIN — CHLORHEXIDINE GLUCONATE 15 MILLILITER(S): 213 SOLUTION TOPICAL at 05:14

## 2022-07-08 RX ADMIN — Medication 40 MILLIGRAM(S): at 05:14

## 2022-07-08 RX ADMIN — Medication 12.5 GRAM(S): at 22:27

## 2022-07-08 RX ADMIN — Medication 3 MILLILITER(S): at 11:14

## 2022-07-08 RX ADMIN — Medication 81 MILLIGRAM(S): at 14:18

## 2022-07-08 RX ADMIN — ATORVASTATIN CALCIUM 40 MILLIGRAM(S): 80 TABLET, FILM COATED ORAL at 22:15

## 2022-07-08 RX ADMIN — Medication 3 MILLILITER(S): at 17:25

## 2022-07-08 RX ADMIN — ENOXAPARIN SODIUM 40 MILLIGRAM(S): 100 INJECTION SUBCUTANEOUS at 15:30

## 2022-07-08 RX ADMIN — PANTOPRAZOLE SODIUM 40 MILLIGRAM(S): 20 TABLET, DELAYED RELEASE ORAL at 14:19

## 2022-07-08 NOTE — PATIENT PROFILE ADULT - FALL HARM RISK - HARM RISK INTERVENTIONS

## 2022-07-08 NOTE — CHART NOTE - NSCHARTNOTEFT_GEN_A_CORE
MAR Accept Note  Aguilar Garcia, PGY3  Transfer to:  Medicine  Accepting Attending Physician:  Dr. Conrad  Assigned Room:  8Mon 823D    Patient seen and examined.   Labs and data reviewed.   No findings precluding transfer of service.       HPI/MICU COURSE:   Please refer to MICU transfer note for full details. Briefly, this is a  87M PMH COPD (not on home O2), HTN, HLD, presented to the ED due to COPD exacerbation.  Despite putting pt on BiPAP and AVASP pt was still having increased work of breathing and was therefore intubated. VBG showed worsening acidosis/hypercapnia. CXR clear. Limited ECHO benign. COVID and RVP negative. In the MICU, pt was started on methyprednisone 40 mg, pulmicort 0.5 mg, duonebs and azithromycin 500 mg. Pt's ABG and mentation improved and pt successfully tolerated extubation on 7/8. Pt is currently stable.     On exam, pt notes feeling much better than arrival. O2 98% on 1.5Lnc. Lungs with scattered expiratory wheezes.       FOR FOLLOW-UP:  []resume home carvedilol and lisinopril when needed   []resume home COPD meds tomorrow   []Dr. Modi (home pulmonologist from [facility]) will be following him in the hospital  [] update son (who lives in [place]) with discharge plans when available    Aguilar Garcia, PGY3

## 2022-07-08 NOTE — PROGRESS NOTE ADULT - ASSESSMENT
87M PMH COPD (not on home O2), HTN, HLD, presented to the ED due to COPD exacerbation. Currently pt is sedated and intubated pending MICU transfer.    # NEUROLOGY  - extubated, tolerating bipap    # CARDIOLOGY  - Hx of HTN, ccurrently holding anti-hypertensives  - no pressor needed    # PULMONARY  - ventilation status: 14/450/5/50% in the ED  - IV steroid for exacerbation w/ PO transition  - VBG: likely respiratory acidosis due to high CO2  - duoneb    # GASTROINTESTINAL   - NPO for now  - once in MICU will give TF    # RENAL/UROLOGY  - de oliveira: draining clear urine    # INFECTIOUS DISEASE  - WBC: 9  - on abx: none     # ENDOCRINOLOGY  - no active issues    # HEMATOLOGY  - DVT PPX: lovenox    # ETHICS  - FULL CODE     87M PMH COPD (not on home O2), HTN, HLD, presented to the ED due to COPD exacerbation. Currently pt is sedated and intubated pending MICU transfer.    # NEUROLOGY  - Extubated. AAOx3. Off sedation.    # CARDIOLOGY  - Hx of HTN, currently holding anti-hypertensives  - Continue to monitor BP as patient is now off sedation  - no pressor needed    # PULMONARY  - ventilation status: 14/450/5/50% in the ED  - IV steroid for exacerbation w/ PO transition  - VBG: likely respiratory acidosis due to high CO2  - 7/8: c/w duoneb    # GASTROINTESTINAL   - NPO for now  - once in MICU will give TF    # RENAL/UROLOGY  - de oliveira: draining clear urine    # INFECTIOUS DISEASE  - 7/8: WBC up to 12.7 from 6.65 likely 2/2 steroids; sputum and blood cx sent  - on abx: azithromycin    # ENDOCRINOLOGY  - no active issues    # HEMATOLOGY  - DVT PPX: lovenox    # ETHICS  - FULL CODE     87M PMH COPD (not on home O2), HTN, HLD, presented to the ED due to COPD exacerbation.     # NEUROLOGY  - Extubated. AAOx3. Off sedation.    # CARDIOLOGY  - Hx of HTN, currently holding anti-hypertensives  - Continue to monitor BP as patient is now off sedation  - no pressor needed    # PULMONARY  - ventilation status: 14/450/5/50% in the ED  - IV steroid for exacerbation w/ PO transition  - VBG: likely respiratory acidosis due to high CO2  - 7/8: c/w duoneb    # GASTROINTESTINAL   - diet: consistent carb diet    # RENAL/UROLOGY  - de oliveira: draining clear urine    # INFECTIOUS DISEASE  - 7/8: WBC up to 12.7 from 6.65 likely 2/2 steroids; sputum and blood cx sent  - on abx: azithromycin    # ENDOCRINOLOGY  - no active issues    # HEMATOLOGY  - DVT PPX: lovenox    # ETHICS  - FULL CODE

## 2022-07-08 NOTE — PROGRESS NOTE ADULT - SUBJECTIVE AND OBJECTIVE BOX
INTERVAL HPI/OVERNIGHT EVENTS:    SUBJECTIVE: Patient seen and examined at bedside.       VITAL SIGNS:  ICU Vital Signs Last 24 Hrs  T(C): 37 (2022 04:00), Max: 37 (2022 04:00)  T(F): 98.6 (2022 04:00), Max: 98.6 (2022 04:00)  HR: 55 (2022 07:00) (46 - 82)  BP: 123/59 (2022 07:00) (70/49 - 151/69)  BP(mean): 85 (2022 07:00) (62 - 117)  ABP: --  ABP(mean): --  RR: 16 (2022 07:00) (13 - 24)  SpO2: 100% (2022 07:00) (90% - 100%)    O2 Parameters below as of 2022 20:00  Patient On (Oxygen Delivery Method): ventilator    O2 Concentration (%): 30      Mode: CPAP with PS, FiO2: 30, PEEP: 5, PS: 5, MAP: 10  Plateau pressure:   P/F ratio:     07-07 @ 07:01  -  -08 @ 07:00  --------------------------------------------------------  IN: 1533.6 mL / OUT: 625 mL / NET: 908.6 mL      CAPILLARY BLOOD GLUCOSE      POCT Blood Glucose.: 141 mg/dL (2022 05:13)    ECG:    PHYSICAL EXAM:    General:   HEENT:   Neck:   Respiratory:   Cardiovascular:   Abdomen:   Extremities:  Neurological:    MEDICATIONS:  MEDICATIONS  (STANDING):  ALBUTerol    90 MICROgram(s) HFA Inhaler 2 Puff(s) Inhalation every 6 hours  albuterol/ipratropium for Nebulization 3 milliLiter(s) Nebulizer every 6 hours  amLODIPine   Tablet 10 milliGRAM(s) Oral daily  aspirin  chewable 81 milliGRAM(s) Oral daily  atorvastatin 40 milliGRAM(s) Oral at bedtime  azithromycin  IVPB 500 milliGRAM(s) IV Intermittent every 24 hours  buDESOnide    Inhalation Suspension 0.5 milliGRAM(s) Inhalation every 12 hours  chlorhexidine 4% Liquid 1 Application(s) Topical <User Schedule>  enoxaparin Injectable 40 milliGRAM(s) SubCutaneous every 24 hours  insulin lispro (ADMELOG) corrective regimen sliding scale   SubCutaneous every 6 hours  lactated ringers. 1000 milliLiter(s) (100 mL/Hr) IV Continuous <Continuous>  methylPREDNISolone sodium succinate Injectable 40 milliGRAM(s) IV Push daily  pantoprazole  Injectable 40 milliGRAM(s) IV Push daily  propofol Infusion 20 MICROgram(s)/kG/Min (6.26 mL/Hr) IV Continuous <Continuous>    MEDICATIONS  (PRN):      ALLERGIES:  Allergies    No Known Allergies    Intolerances        LABS:                        12.6   12.72 )-----------( 148      ( 2022 00:41 )             36.1     07-08    132<L>  |  96  |  21  ----------------------------<  122<H>  4.4   |  26  |  0.68    Ca    9.1      2022 00:40  Phos  3.1     07-08  Mg     1.7     07-08    TPro  6.0  /  Alb  3.4  /  TBili  1.0  /  DBili  x   /  AST  27  /  ALT  16  /  AlkPhos  47  07-08      Urinalysis Basic - ( 2022 13:42 )    Color: Yellow / Appearance: Clear / S.022 / pH: x  Gluc: x / Ketone: Small  / Bili: Negative / Urobili: 2 mg/dL   Blood: x / Protein: 30 mg/dL / Nitrite: Negative   Leuk Esterase: Negative / RBC: 2 /hpf / WBC 2 /HPF   Sq Epi: x / Non Sq Epi: 5 /hpf / Bacteria: Negative        RADIOLOGY & ADDITIONAL TESTS: Reviewed.   INTERVAL HPI/OVERNIGHT EVENTS:    SUBJECTIVE: Patient seen and examined at bedside.       VITAL SIGNS:  ICU Vital Signs Last 24 Hrs  T(C): 37 (2022 04:00), Max: 37 (2022 04:00)  T(F): 98.6 (2022 04:00), Max: 98.6 (2022 04:00)  HR: 55 (2022 07:00) (46 - 82)  BP: 123/59 (2022 07:00) (70/49 - 151/69)  BP(mean): 85 (2022 07:00) (62 - 117)  ABP: --  ABP(mean): --  RR: 16 (2022 07:00) (13 - 24)  SpO2: 100% (2022 07:00) (90% - 100%)    O2 Parameters below as of 2022 20:00  Patient On (Oxygen Delivery Method): ventilator    O2 Concentration (%): 30      Mode: CPAP with PS, FiO2: 30, PEEP: 5, PS: 5, MAP: 10  Plateau pressure:   P/F ratio:     07-07 @ 07:01  -  -08 @ 07:00  --------------------------------------------------------  IN: 1533.6 mL / OUT: 625 mL / NET: 908.6 mL      CAPILLARY BLOOD GLUCOSE      POCT Blood Glucose.: 141 mg/dL (2022 05:13)    ECG:  Physical Exam:   GENERAL:  HEAD:  Atraumatic, normocephalic  EYES: EOMI, PERRLA, conjunctiva and sclera clear  ENT: Moist mucous membranes  NECK: Supple, no JVD  HEART: Regular rate and rhythm, no murmurs, rubs, or gallops  LUNGS: sedated and intubated w/ decreased air movement  ABDOMEN: Soft, nontender, nondistended, +BS  EXTREMITIES: 1+ pitting edema b/l  NERVOUS SYSTEM:  A&Ox3, no focal deficits   SKIN: No rashes or lesions    MEDICATIONS:  MEDICATIONS  (STANDING):  ALBUTerol    90 MICROgram(s) HFA Inhaler 2 Puff(s) Inhalation every 6 hours  albuterol/ipratropium for Nebulization 3 milliLiter(s) Nebulizer every 6 hours  amLODIPine   Tablet 10 milliGRAM(s) Oral daily  aspirin  chewable 81 milliGRAM(s) Oral daily  atorvastatin 40 milliGRAM(s) Oral at bedtime  azithromycin  IVPB 500 milliGRAM(s) IV Intermittent every 24 hours  buDESOnide    Inhalation Suspension 0.5 milliGRAM(s) Inhalation every 12 hours  chlorhexidine 4% Liquid 1 Application(s) Topical <User Schedule>  enoxaparin Injectable 40 milliGRAM(s) SubCutaneous every 24 hours  insulin lispro (ADMELOG) corrective regimen sliding scale   SubCutaneous every 6 hours  lactated ringers. 1000 milliLiter(s) (100 mL/Hr) IV Continuous <Continuous>  methylPREDNISolone sodium succinate Injectable 40 milliGRAM(s) IV Push daily  pantoprazole  Injectable 40 milliGRAM(s) IV Push daily  propofol Infusion 20 MICROgram(s)/kG/Min (6.26 mL/Hr) IV Continuous <Continuous>    MEDICATIONS  (PRN):      ALLERGIES:  Allergies    No Known Allergies    Intolerances        LABS:                        12.6   12.72 )-----------( 148      ( 2022 00:41 )             36.1     07-08    132<L>  |  96  |  21  ----------------------------<  122<H>  4.4   |  26  |  0.68    Ca    9.1      2022 00:40  Phos  3.1     07-08  Mg     1.7     07-08    TPro  6.0  /  Alb  3.4  /  TBili  1.0  /  DBili  x   /  AST  27  /  ALT  16  /  AlkPhos  47  07-08      Urinalysis Basic - ( 2022 13:42 )    Color: Yellow / Appearance: Clear / S.022 / pH: x  Gluc: x / Ketone: Small  / Bili: Negative / Urobili: 2 mg/dL   Blood: x / Protein: 30 mg/dL / Nitrite: Negative   Leuk Esterase: Negative / RBC: 2 /hpf / WBC 2 /HPF   Sq Epi: x / Non Sq Epi: 5 /hpf / Bacteria: Negative        RADIOLOGY & ADDITIONAL TESTS: Reviewed.   INTERVAL HPI/OVERNIGHT EVENTS: Pt extubated and weaned to BIPAP. Was transitioned to 2L NC this AM. No other OVN events.     SUBJECTIVE: Patient seen and examined at bedside.     VITAL SIGNS:  ICU Vital Signs Last 24 Hrs  T(C): 37 (2022 04:00), Max: 37 (2022 04:00)  T(F): 98.6 (2022 04:00), Max: 98.6 (2022 04:00)  HR: 55 (2022 07:00) (46 - 82)  BP: 123/59 (2022 07:00) (70/49 - 151/69)  BP(mean): 85 (2022 07:00) (62 - 117)  ABP: --  ABP(mean): --  RR: 16 (2022 07:00) (13 - 24)  SpO2: 100% (2022 07:00) (90% - 100%)    O2 Parameters below as of 2022 20:00  Patient On (Oxygen Delivery Method): ventilator    O2 Concentration (%): 30      Mode: CPAP with PS, FiO2: 30, PEEP: 5, PS: 5, MAP: 10  Plateau pressure:   P/F ratio:     -07 @ 07:01  -  -08 @ 07:00  --------------------------------------------------------  IN: 1533.6 mL / OUT: 625 mL / NET: 908.6 mL      CAPILLARY BLOOD GLUCOSE      POCT Blood Glucose.: 141 mg/dL (2022 05:13)    ECG:  Physical Exam:   GENERAL: AAOx3.   HEAD:  Atraumatic, normocephalic  EYES: EOMI, PERRLA, conjunctiva and sclera clear  ENT: Moist mucous membranes  NECK: Supple, no JVD  HEART: Regular rate and rhythm, no murmurs, rubs, or gallops  LUNGS: sedated and intubated w/ decreased air movement  ABDOMEN: Soft, nontender, nondistended, +BS  EXTREMITIES: 1+ pitting edema b/l  NERVOUS SYSTEM:  A&Ox3, no focal deficits   SKIN: No rashes or lesions    MEDICATIONS:  MEDICATIONS  (STANDING):  ALBUTerol    90 MICROgram(s) HFA Inhaler 2 Puff(s) Inhalation every 6 hours  albuterol/ipratropium for Nebulization 3 milliLiter(s) Nebulizer every 6 hours  amLODIPine   Tablet 10 milliGRAM(s) Oral daily  aspirin  chewable 81 milliGRAM(s) Oral daily  atorvastatin 40 milliGRAM(s) Oral at bedtime  azithromycin  IVPB 500 milliGRAM(s) IV Intermittent every 24 hours  buDESOnide    Inhalation Suspension 0.5 milliGRAM(s) Inhalation every 12 hours  chlorhexidine 4% Liquid 1 Application(s) Topical <User Schedule>  enoxaparin Injectable 40 milliGRAM(s) SubCutaneous every 24 hours  insulin lispro (ADMELOG) corrective regimen sliding scale   SubCutaneous every 6 hours  lactated ringers. 1000 milliLiter(s) (100 mL/Hr) IV Continuous <Continuous>  methylPREDNISolone sodium succinate Injectable 40 milliGRAM(s) IV Push daily  pantoprazole  Injectable 40 milliGRAM(s) IV Push daily  propofol Infusion 20 MICROgram(s)/kG/Min (6.26 mL/Hr) IV Continuous <Continuous>    MEDICATIONS  (PRN):      ALLERGIES:  Allergies    No Known Allergies    Intolerances        LABS:                        12.6   12.72 )-----------( 148      ( 2022 00:41 )             36.1     07-08    132<L>  |  96  |  21  ----------------------------<  122<H>  4.4   |  26  |  0.68    Ca    9.1      2022 00:40  Phos  3.1     07-08  Mg     1.7     07-08    TPro  6.0  /  Alb  3.4  /  TBili  1.0  /  DBili  x   /  AST  27  /  ALT  16  /  AlkPhos  47  07-08      Urinalysis Basic - ( 2022 13:42 )    Color: Yellow / Appearance: Clear / S.022 / pH: x  Gluc: x / Ketone: Small  / Bili: Negative / Urobili: 2 mg/dL   Blood: x / Protein: 30 mg/dL / Nitrite: Negative   Leuk Esterase: Negative / RBC: 2 /hpf / WBC 2 /HPF   Sq Epi: x / Non Sq Epi: 5 /hpf / Bacteria: Negative        RADIOLOGY & ADDITIONAL TESTS: Reviewed.

## 2022-07-08 NOTE — CHART NOTE - NSCHARTNOTEFT_GEN_A_CORE
MICU DOWN GRADE NOTE      Patient is a 87y old  Male who presents with a chief complaint of COPD exacerbation (2022 07:12)      HPI: 87M PMH COPD (not on home O2), HTN, HLD, presented to the ED due to COPD exacerbation. Overnight pt showed increased work of breathing, and despite using albuterol symptoms cannot be controlled. He was brought in by his grandson. Despite putting pt on BiPAP and AVASP pt was still having increased work of breathing and was therefore intubated after being paralyzed. VBG showed worsening acidosis. CXR clear. Limited ECHO benign. COVID and RVP negative.    INTERVAL HPI: Pt was intubated on  for acute respiratory failure caused by COPD exacerbation. In the MICU, pt was started on methyprednisone 40 mg, pulmicort 0.5 mg, duonebs and azithromycin 500 mg. Pt's ABG and mentation improved and pt successfully tolerated extubation on . Pt is currently stable.       REVIEW OF SYSTEMS:  CONSTITUTIONAL: No fever, chills  HEENT:  No blurry vision No sinus or throat pain  NECK: No pain or stiffness  RESPIRATORY: No cough, wheezing, chills or hemoptysis; No shortness of breath  CARDIOVASCULAR: No chest pain, palpitations  GASTROINTESTINAL: No abdominal pain. No nausea, vomiting, or diarrhea  GENITOURINARY: No dysuria  NEUROLOGICAL: No HA, No focal weakness  SKIN: No itching, burning, rashes, or lesions   MUSCULOSKELETAL: No joint pain or swelling; No muscle, back, or extremity pain    MEDICATIONS:  ALBUTerol    90 MICROgram(s) HFA Inhaler 2 Puff(s) Inhalation every 6 hours  albuterol/ipratropium for Nebulization 3 milliLiter(s) Nebulizer every 6 hours  amLODIPine   Tablet 10 milliGRAM(s) Oral daily  aspirin  chewable 81 milliGRAM(s) Oral daily  atorvastatin 40 milliGRAM(s) Oral at bedtime  azithromycin  IVPB 500 milliGRAM(s) IV Intermittent every 24 hours  buDESOnide    Inhalation Suspension 0.5 milliGRAM(s) Inhalation every 12 hours  chlorhexidine 4% Liquid 1 Application(s) Topical <User Schedule>  enoxaparin Injectable 40 milliGRAM(s) SubCutaneous every 24 hours  insulin lispro (ADMELOG) corrective regimen sliding scale   SubCutaneous every 6 hours  lactated ringers. 1000 milliLiter(s) IV Continuous <Continuous>  methylPREDNISolone sodium succinate Injectable 40 milliGRAM(s) IV Push daily  pantoprazole  Injectable 40 milliGRAM(s) IV Push daily  propofol Infusion 20 MICROgram(s)/kG/Min IV Continuous <Continuous>      T(C): 37 (22 @ 04:00), Max: 37 (22 @ 04:00)  HR: 60 (22 @ 08:30) (52 - 82)  BP: 123/59 (22 @ 07:00) (94/50 - 151/69)  RR: 16 (22 @ 07:00) (13 - 24)  SpO2: 99% (22 @ 08:30) (90% - 100%)  Wt(kg): --Vital Signs Last 24 Hrs  T(C): 37 (2022 04:00), Max: 37 (2022 04:00)  T(F): 98.6 (2022 04:00), Max: 98.6 (2022 04:00)  HR: 60 (2022 08:30) (52 - 82)  BP: 123/59 (2022 07:00) (94/50 - 151/69)  BP(mean): 85 (2022 07:00) (68 - 117)  RR: 16 (2022 07:00) (13 - 24)  SpO2: 99% (2022 08:30) (90% - 100%)      PHYSICAL EXAM:  GENERAL: NAD, thin elderly male laying in bed.  HEAD:  Atraumatic, Normocephalic  EYES: EOMI, PERRLA, conjunctiva and sclera clear  ENMT:  Moist mucous membranes  NECK: Supple, No JVD,  CHEST/LUNG: Clear to auscultation bilaterally; No rales, rhonchi, wheezing, or rubs  HEART: Regular rate and rhythm; No murmurs, rubs, or gallops  ABDOMEN: Soft, Nontender, Nondistended; Bowel sounds present  NEURO: Alert & Oriented X3  EXTREMITIES: No LE edema, no calf tenderness  LYMPH: No lymphadenopathy noted  SKIN: No rashes or lesions    Consultant(s) Notes Reviewed:  [x ] YES  [ ] NO  Care Discussed with Consultants/Other Providers [ x] YES  [ ] NO    LABS:                        12.6   12.72 )-----------( 148      ( 2022 00:41 )             36.1     07-08    132<L>  |  96  |  21  ----------------------------<  122<H>  4.4   |  26  |  0.68    Ca    9.1      2022 00:40  Phos  3.1     07-08  Mg     1.7     07-08    TPro  6.0  /  Alb  3.4  /  TBili  1.0  /  DBili  x   /  AST  27  /  ALT  16  /  AlkPhos  47  07-08      Urinalysis Basic - ( 2022 13:42 )    Color: Yellow / Appearance: Clear / S.022 / pH: x  Gluc: x / Ketone: Small  / Bili: Negative / Urobili: 2 mg/dL   Blood: x / Protein: 30 mg/dL / Nitrite: Negative   Leuk Esterase: Negative / RBC: 2 /hpf / WBC 2 /HPF   Sq Epi: x / Non Sq Epi: 5 /hpf / Bacteria: Negative      CAPILLARY BLOOD GLUCOSE      POCT Blood Glucose.: 141 mg/dL (2022 05:13)  POCT Blood Glucose.: 140 mg/dL (2022 18:08)      ABG - ( 2022 00:30 )  pH, Arterial: 7.51  pH, Blood: x     /  pCO2: 40    /  pO2: 139   / HCO3: 32    / Base Excess: 8.2   /  SaO2: 99.6        Urinalysis Basic - ( 2022 13:42 )    Color: Yellow / Appearance: Clear / S.022 / pH: x  Gluc: x / Ketone: Small  / Bili: Negative / Urobili: 2 mg/dL   Blood: x / Protein: 30 mg/dL / Nitrite: Negative   Leuk Esterase: Negative / RBC: 2 /hpf / WBC 2 /HPF   Sq Epi: x / Non Sq Epi: 5 /hpf / Bacteria: Negative        RADIOLOGY & ADDITIONAL TESTS:    Imaging Personally Reviewed:  [x ] YES  [ ] NO MICU DOWN GRADE NOTE      Patient is a 87y old  Male who presents with a chief complaint of COPD exacerbation (2022 07:12)      HPI: 87M PMH COPD (not on home O2), HTN, HLD, presented to the ED due to COPD exacerbation. Overnight pt showed increased work of breathing, and despite using albuterol symptoms cannot be controlled. He was brought in by his grandson. Despite putting pt on BiPAP and AVASP pt was still having increased work of breathing and was therefore intubated after being paralyzed. VBG showed worsening acidosis. CXR clear. Limited ECHO benign. COVID and RVP negative.    INTERVAL HPI: Pt was intubated on  for acute respiratory failure caused by COPD exacerbation. In the MICU, pt was started on methyprednisone 40 mg, pulmicort 0.5 mg, duonebs and azithromycin 500 mg. Pt's ABG and mentation improved and pt successfully tolerated extubation on . Pt is currently stable.       REVIEW OF SYSTEMS:  CONSTITUTIONAL: No fever, chills  HEENT:  No blurry vision No sinus or throat pain  NECK: No pain or stiffness  RESPIRATORY: No cough, wheezing, chills or hemoptysis; No shortness of breath  CARDIOVASCULAR: No chest pain, palpitations  GASTROINTESTINAL: No abdominal pain. No nausea, vomiting, or diarrhea  GENITOURINARY: No dysuria  NEUROLOGICAL: No HA, No focal weakness  SKIN: No itching, burning, rashes, or lesions   MUSCULOSKELETAL: No joint pain or swelling; No muscle, back, or extremity pain    MEDICATIONS:  ALBUTerol    90 MICROgram(s) HFA Inhaler 2 Puff(s) Inhalation every 6 hours  albuterol/ipratropium for Nebulization 3 milliLiter(s) Nebulizer every 6 hours  amLODIPine   Tablet 10 milliGRAM(s) Oral daily  aspirin  chewable 81 milliGRAM(s) Oral daily  atorvastatin 40 milliGRAM(s) Oral at bedtime  azithromycin  IVPB 500 milliGRAM(s) IV Intermittent every 24 hours  buDESOnide    Inhalation Suspension 0.5 milliGRAM(s) Inhalation every 12 hours  chlorhexidine 4% Liquid 1 Application(s) Topical <User Schedule>  enoxaparin Injectable 40 milliGRAM(s) SubCutaneous every 24 hours  insulin lispro (ADMELOG) corrective regimen sliding scale   SubCutaneous every 6 hours  lactated ringers. 1000 milliLiter(s) IV Continuous <Continuous>  methylPREDNISolone sodium succinate Injectable 40 milliGRAM(s) IV Push daily  pantoprazole  Injectable 40 milliGRAM(s) IV Push daily  propofol Infusion 20 MICROgram(s)/kG/Min IV Continuous <Continuous>      T(C): 37 (22 @ 04:00), Max: 37 (22 @ 04:00)  HR: 60 (22 @ 08:30) (52 - 82)  BP: 123/59 (22 @ 07:00) (94/50 - 151/69)  RR: 16 (22 @ 07:00) (13 - 24)  SpO2: 99% (22 @ 08:30) (90% - 100%)  Wt(kg): --Vital Signs Last 24 Hrs  T(C): 37 (2022 04:00), Max: 37 (2022 04:00)  T(F): 98.6 (2022 04:00), Max: 98.6 (2022 04:00)  HR: 60 (2022 08:30) (52 - 82)  BP: 123/59 (2022 07:00) (94/50 - 151/69)  BP(mean): 85 (2022 07:00) (68 - 117)  RR: 16 (2022 07:00) (13 - 24)  SpO2: 99% (2022 08:30) (90% - 100%)      PHYSICAL EXAM:  GENERAL: NAD, thin elderly male laying in bed.  HEAD:  Atraumatic, Normocephalic  EYES: EOMI, PERRLA, conjunctiva and sclera clear  ENMT:  Moist mucous membranes  NECK: Supple, No JVD,  CHEST/LUNG: Clear to auscultation bilaterally; No rales, rhonchi, wheezing, or rubs  HEART: Regular rate and rhythm; No murmurs, rubs, or gallops  ABDOMEN: Soft, Nontender, Nondistended; Bowel sounds present  NEURO: Alert & Oriented X3  EXTREMITIES: No LE edema, no calf tenderness  LYMPH: No lymphadenopathy noted  SKIN: No rashes or lesions    Consultant(s) Notes Reviewed:  [x ] YES  [ ] NO  Care Discussed with Consultants/Other Providers [ x] YES  [ ] NO    LABS:                        12.6   12.72 )-----------( 148      ( 2022 00:41 )             36.1     07-08    132<L>  |  96  |  21  ----------------------------<  122<H>  4.4   |  26  |  0.68    Ca    9.1      2022 00:40  Phos  3.1     07-08  Mg     1.7     07-08    TPro  6.0  /  Alb  3.4  /  TBili  1.0  /  DBili  x   /  AST  27  /  ALT  16  /  AlkPhos  47  07-08      Urinalysis Basic - ( 2022 13:42 )    Color: Yellow / Appearance: Clear / S.022 / pH: x  Gluc: x / Ketone: Small  / Bili: Negative / Urobili: 2 mg/dL   Blood: x / Protein: 30 mg/dL / Nitrite: Negative   Leuk Esterase: Negative / RBC: 2 /hpf / WBC 2 /HPF   Sq Epi: x / Non Sq Epi: 5 /hpf / Bacteria: Negative      CAPILLARY BLOOD GLUCOSE      POCT Blood Glucose.: 141 mg/dL (2022 05:13)  POCT Blood Glucose.: 140 mg/dL (2022 18:08)      ABG - ( 2022 00:30 )  pH, Arterial: 7.51  pH, Blood: x     /  pCO2: 40    /  pO2: 139   / HCO3: 32    / Base Excess: 8.2   /  SaO2: 99.6      Assessment and Plan:     87M PMH COPD (not on home O2), HTN, HLD, presented to the ED due to COPD exacerbation. Currently pt is sedated and intubated pending MICU transfer.    # NEUROLOGY  - Extubated. AAOx3. Off sedation.    # CARDIOLOGY  - Hx of HTN, c/w amlodipine 10 mg (pt normally on felodipine at home)  - continue to hold his 2 other home anti-hypertensive meds as pt is normotensive  - Continue to monitor BP as patient is now off sedation  - no pressor needed    # PULMONARY  - extubated on   - IV steroid for exacerbation w/ PO transition  - pulmicort .5 mg  - VBG: likely respiratory acidosis due to high CO2  - : c/w duoneb    # GASTROINTESTINAL   - OG tube; NPO w/ TF currently  - bedside swallow eval today    # RENAL/UROLOGY  - de oliveira: draining clear urine    # INFECTIOUS DISEASE  - : WBC up to 12.7 from 6.65 likely 2/2 steroids; sputum and blood cx sent  - on abx for anti-inflammatory effect: azithromycin    # ENDOCRINOLOGY  - no active issues    # HEMATOLOGY  - DVT PPX: lovenox    # ETHICS  - FULL CODE    To Dos:    -resume home carvedilol and lisinopril   -resume home COPD meds when appropriate MICU DOWN GRADE NOTE      Patient is a 87y old  Male who presents with a chief complaint of COPD exacerbation (2022 07:12)      HPI: 87M PMH COPD (not on home O2), HTN, HLD, presented to the ED due to COPD exacerbation. Overnight pt showed increased work of breathing, and despite using albuterol symptoms cannot be controlled. He was brought in by his grandson. Despite putting pt on BiPAP and AVASP pt was still having increased work of breathing and was therefore intubated after being paralyzed. VBG showed worsening acidosis. CXR clear. Limited ECHO benign. COVID and RVP negative.    INTERVAL HPI: Pt was intubated on  for acute respiratory failure caused by COPD exacerbation. In the MICU, pt was started on methyprednisone 40 mg, pulmicort 0.5 mg, duonebs and azithromycin 500 mg. Pt's ABG and mentation improved and pt successfully tolerated extubation on . Pt is currently stable.       REVIEW OF SYSTEMS:  CONSTITUTIONAL: No fever, chills  HEENT:  No blurry vision No sinus or throat pain  NECK: No pain or stiffness  RESPIRATORY: No cough, wheezing, chills or hemoptysis; No shortness of breath  CARDIOVASCULAR: No chest pain, palpitations  GASTROINTESTINAL: No abdominal pain. No nausea, vomiting, or diarrhea  GENITOURINARY: No dysuria  NEUROLOGICAL: No HA, No focal weakness  SKIN: No itching, burning, rashes, or lesions   MUSCULOSKELETAL: No joint pain or swelling; No muscle, back, or extremity pain    MEDICATIONS:  ALBUTerol    90 MICROgram(s) HFA Inhaler 2 Puff(s) Inhalation every 6 hours  albuterol/ipratropium for Nebulization 3 milliLiter(s) Nebulizer every 6 hours  amLODIPine   Tablet 10 milliGRAM(s) Oral daily  aspirin  chewable 81 milliGRAM(s) Oral daily  atorvastatin 40 milliGRAM(s) Oral at bedtime  azithromycin  IVPB 500 milliGRAM(s) IV Intermittent every 24 hours  buDESOnide    Inhalation Suspension 0.5 milliGRAM(s) Inhalation every 12 hours  chlorhexidine 4% Liquid 1 Application(s) Topical <User Schedule>  enoxaparin Injectable 40 milliGRAM(s) SubCutaneous every 24 hours  insulin lispro (ADMELOG) corrective regimen sliding scale   SubCutaneous every 6 hours  lactated ringers. 1000 milliLiter(s) IV Continuous <Continuous>  methylPREDNISolone sodium succinate Injectable 40 milliGRAM(s) IV Push daily  pantoprazole  Injectable 40 milliGRAM(s) IV Push daily  propofol Infusion 20 MICROgram(s)/kG/Min IV Continuous <Continuous>      T(C): 37 (22 @ 04:00), Max: 37 (22 @ 04:00)  HR: 60 (22 @ 08:30) (52 - 82)  BP: 123/59 (22 @ 07:00) (94/50 - 151/69)  RR: 16 (22 @ 07:00) (13 - 24)  SpO2: 99% (22 @ 08:30) (90% - 100%)  Wt(kg): --Vital Signs Last 24 Hrs  T(C): 37 (2022 04:00), Max: 37 (2022 04:00)  T(F): 98.6 (2022 04:00), Max: 98.6 (2022 04:00)  HR: 60 (2022 08:30) (52 - 82)  BP: 123/59 (2022 07:00) (94/50 - 151/69)  BP(mean): 85 (2022 07:00) (68 - 117)  RR: 16 (2022 07:00) (13 - 24)  SpO2: 99% (2022 08:30) (90% - 100%)      PHYSICAL EXAM:  GENERAL: NAD, thin elderly male laying in bed.  HEAD:  Atraumatic, Normocephalic  EYES: EOMI, PERRLA, conjunctiva and sclera clear  ENMT:  Moist mucous membranes  NECK: Supple, No JVD,  CHEST/LUNG: Clear to auscultation bilaterally; No rales, rhonchi, wheezing, or rubs  HEART: Regular rate and rhythm; No murmurs, rubs, or gallops  ABDOMEN: Soft, Nontender, Nondistended; Bowel sounds present  NEURO: Alert & Oriented X3  EXTREMITIES: No LE edema, no calf tenderness  LYMPH: No lymphadenopathy noted  SKIN: No rashes or lesions    Consultant(s) Notes Reviewed:  [x ] YES  [ ] NO  Care Discussed with Consultants/Other Providers [ x] YES  [ ] NO    LABS:                        12.6   12.72 )-----------( 148      ( 2022 00:41 )             36.1     07-08    132<L>  |  96  |  21  ----------------------------<  122<H>  4.4   |  26  |  0.68    Ca    9.1      2022 00:40  Phos  3.1     07-08  Mg     1.7     07-08    TPro  6.0  /  Alb  3.4  /  TBili  1.0  /  DBili  x   /  AST  27  /  ALT  16  /  AlkPhos  47  07-08      Urinalysis Basic - ( 2022 13:42 )    Color: Yellow / Appearance: Clear / S.022 / pH: x  Gluc: x / Ketone: Small  / Bili: Negative / Urobili: 2 mg/dL   Blood: x / Protein: 30 mg/dL / Nitrite: Negative   Leuk Esterase: Negative / RBC: 2 /hpf / WBC 2 /HPF   Sq Epi: x / Non Sq Epi: 5 /hpf / Bacteria: Negative      CAPILLARY BLOOD GLUCOSE      POCT Blood Glucose.: 141 mg/dL (2022 05:13)  POCT Blood Glucose.: 140 mg/dL (2022 18:08)      ABG - ( 2022 00:30 )  pH, Arterial: 7.51  pH, Blood: x     /  pCO2: 40    /  pO2: 139   / HCO3: 32    / Base Excess: 8.2   /  SaO2: 99.6      Assessment and Plan:     87M PMH COPD (not on home O2), HTN, HLD, presented to the ED due to COPD exacerbation. Currently pt is sedated and intubated pending MICU transfer.    # NEUROLOGY  - Extubated. AAOx3. Off sedation.    # CARDIOLOGY  - Hx of HTN, c/w amlodipine 10 mg (pt normally on felodipine at home)  - continue to hold his 2 other home anti-hypertensive meds as pt is normotensive  - Continue to monitor BP as patient is now off sedation  - no pressor needed    # PULMONARY  - extubated on   - IV steroid for exacerbation w/ PO transition  - pulmicort .5 mg  - VBG: likely respiratory acidosis due to high CO2  - : c/w duoneb    # GASTROINTESTINAL   - OG tube; NPO w/ TF currently  - bedside swallow eval today    # RENAL/UROLOGY  - de oliveira: draining clear urine    # INFECTIOUS DISEASE  - : WBC up to 12.7 from 6.65 likely 2/2 steroids; sputum and blood cx sent  - on abx for anti-inflammatory effect: azithromycin    # ENDOCRINOLOGY  - no active issues    # HEMATOLOGY  - DVT PPX: lovenox    # ETHICS  - FULL CODE    To Dos:    -resume home carvedilol and lisinopril when needed   -resume home COPD meds tomorrow   - MICU DOWN GRADE NOTE    Unit:  Accepting Attending:      Patient is a 87y old  Male who presents with a chief complaint of COPD exacerbation (2022 07:12)      HPI: 87M PMH COPD (not on home O2), HTN, HLD, presented to the ED due to COPD exacerbation. Overnight pt showed increased work of breathing, and despite using albuterol symptoms cannot be controlled. He was brought in by his grandson. Despite putting pt on BiPAP and AVASP pt was still having increased work of breathing and was therefore intubated after being paralyzed. VBG showed worsening acidosis. CXR clear. Limited ECHO benign. COVID and RVP negative.    INTERVAL HPI: Pt was intubated on  for acute respiratory failure caused by COPD exacerbation. In the MICU, pt was started on methyprednisone 40 mg, pulmicort 0.5 mg, duonebs and azithromycin 500 mg. Pt's ABG and mentation improved and pt successfully tolerated extubation on . Pt is currently stable.       REVIEW OF SYSTEMS:  CONSTITUTIONAL: No fever, chills  HEENT:  No blurry vision No sinus or throat pain  NECK: No pain or stiffness  RESPIRATORY: No cough, wheezing, chills or hemoptysis; No shortness of breath  CARDIOVASCULAR: No chest pain, palpitations  GASTROINTESTINAL: No abdominal pain. No nausea, vomiting, or diarrhea  GENITOURINARY: No dysuria  NEUROLOGICAL: No HA, No focal weakness  SKIN: No itching, burning, rashes, or lesions   MUSCULOSKELETAL: No joint pain or swelling; No muscle, back, or extremity pain    MEDICATIONS:  ALBUTerol    90 MICROgram(s) HFA Inhaler 2 Puff(s) Inhalation every 6 hours  albuterol/ipratropium for Nebulization 3 milliLiter(s) Nebulizer every 6 hours  amLODIPine   Tablet 10 milliGRAM(s) Oral daily  aspirin  chewable 81 milliGRAM(s) Oral daily  atorvastatin 40 milliGRAM(s) Oral at bedtime  azithromycin  IVPB 500 milliGRAM(s) IV Intermittent every 24 hours  buDESOnide    Inhalation Suspension 0.5 milliGRAM(s) Inhalation every 12 hours  chlorhexidine 4% Liquid 1 Application(s) Topical <User Schedule>  enoxaparin Injectable 40 milliGRAM(s) SubCutaneous every 24 hours  insulin lispro (ADMELOG) corrective regimen sliding scale   SubCutaneous every 6 hours  lactated ringers. 1000 milliLiter(s) IV Continuous <Continuous>  methylPREDNISolone sodium succinate Injectable 40 milliGRAM(s) IV Push daily  pantoprazole  Injectable 40 milliGRAM(s) IV Push daily  propofol Infusion 20 MICROgram(s)/kG/Min IV Continuous <Continuous>      T(C): 37 (22 @ 04:00), Max: 37 (22 @ 04:00)  HR: 60 (22 @ 08:30) (52 - 82)  BP: 123/59 (22 @ 07:00) (94/50 - 151/69)  RR: 16 (22 @ 07:00) (13 - 24)  SpO2: 99% (22 @ 08:30) (90% - 100%)  Wt(kg): --Vital Signs Last 24 Hrs  T(C): 37 (2022 04:00), Max: 37 (2022 04:00)  T(F): 98.6 (2022 04:00), Max: 98.6 (2022 04:00)  HR: 60 (2022 08:30) (52 - 82)  BP: 123/59 (2022 07:00) (94/50 - 151/69)  BP(mean): 85 (2022 07:00) (68 - 117)  RR: 16 (2022 07:00) (13 - 24)  SpO2: 99% (2022 08:30) (90% - 100%)      PHYSICAL EXAM:  GENERAL: NAD, thin elderly male laying in bed.  HEAD:  Atraumatic, Normocephalic  EYES: EOMI, PERRLA, conjunctiva and sclera clear  ENMT:  Moist mucous membranes  NECK: Supple, No JVD,  CHEST/LUNG: Clear to auscultation bilaterally; No rales, rhonchi, wheezing, or rubs  HEART: Regular rate and rhythm; No murmurs, rubs, or gallops  ABDOMEN: Soft, Nontender, Nondistended; Bowel sounds present  NEURO: Alert & Oriented X3  EXTREMITIES: No LE edema, no calf tenderness  LYMPH: No lymphadenopathy noted  SKIN: No rashes or lesions    Consultant(s) Notes Reviewed:  [x ] YES  [ ] NO  Care Discussed with Consultants/Other Providers [ x] YES  [ ] NO    LABS:                        12.6   12.72 )-----------( 148      ( 2022 00:41 )             36.1     -    132<L>  |  96  |  21  ----------------------------<  122<H>  4.4   |  26  |  0.68    Ca    9.1      2022 00:40  Phos  3.1     -  Mg     1.7     -    TPro  6.0  /  Alb  3.4  /  TBili  1.0  /  DBili  x   /  AST  27  /  ALT  16  /  AlkPhos  47  -      Urinalysis Basic - ( 2022 13:42 )    Color: Yellow / Appearance: Clear / S.022 / pH: x  Gluc: x / Ketone: Small  / Bili: Negative / Urobili: 2 mg/dL   Blood: x / Protein: 30 mg/dL / Nitrite: Negative   Leuk Esterase: Negative / RBC: 2 /hpf / WBC 2 /HPF   Sq Epi: x / Non Sq Epi: 5 /hpf / Bacteria: Negative      CAPILLARY BLOOD GLUCOSE      POCT Blood Glucose.: 141 mg/dL (2022 05:13)  POCT Blood Glucose.: 140 mg/dL (2022 18:08)      ABG - ( 2022 00:30 )  pH, Arterial: 7.51  pH, Blood: x     /  pCO2: 40    /  pO2: 139   / HCO3: 32    / Base Excess: 8.2   /  SaO2: 99.6      Assessment and Plan:     87M PMH COPD (not on home O2), HTN, HLD, presented to the ED due to COPD exacerbation. Currently pt is sedated and intubated pending MICU transfer.    # NEUROLOGY  - Extubated. AAOx3. Off sedation.    # CARDIOLOGY  - Hx of HTN, c/w amlodipine 10 mg (pt normally on felodipine at home)  - continue to hold his 2 other home anti-hypertensive meds as pt is normotensive  - Continue to monitor BP as patient is now off sedation  - no pressor needed    # PULMONARY  - extubated on   - IV steroid for exacerbation w/ PO transition  - pulmicort .5 mg  - VBG: likely respiratory acidosis due to high CO2  - 7/8: c/w duoneb    # GASTROINTESTINAL   - OG tube; NPO w/ TF currently  - bedside swallow eval today    # RENAL/UROLOGY  - de oliveira: draining clear urine    # INFECTIOUS DISEASE  - : WBC up to 12.7 from 6.65 likely 2/2 steroids; sputum and blood cx sent  - on abx for anti-inflammatory effect: azithromycin    # ENDOCRINOLOGY  - no active issues    # HEMATOLOGY  - DVT PPX: lovenox    # ETHICS  - FULL CODE    To Dos:    -resume home carvedilol and lisinopril when needed   -resume home COPD meds tomorrow   - MICU DOWN GRADE NOTE    Unit:  Accepting Attending: Dr. Davi Conrad      Patient is a 87y old  Male who presents with a chief complaint of COPD exacerbation (2022 07:12)      HPI: 87M PMH COPD (not on home O2), HTN, HLD, presented to the ED due to COPD exacerbation. Overnight pt showed increased work of breathing, and despite using albuterol symptoms cannot be controlled. He was brought in by his grandson. Despite putting pt on BiPAP and AVASP pt was still having increased work of breathing and was therefore intubated after being paralyzed. VBG showed worsening acidosis. CXR clear. Limited ECHO benign. COVID and RVP negative.    INTERVAL HPI: Pt was intubated on  for acute respiratory failure caused by COPD exacerbation. In the MICU, pt was started on methyprednisone 40 mg, pulmicort 0.5 mg, duonebs and azithromycin 500 mg. Pt's ABG and mentation improved and pt successfully tolerated extubation on . Pt is currently stable.       REVIEW OF SYSTEMS:  CONSTITUTIONAL: No fever, chills  HEENT:  No blurry vision No sinus or throat pain  NECK: No pain or stiffness  RESPIRATORY: No cough, wheezing, chills or hemoptysis; No shortness of breath  CARDIOVASCULAR: No chest pain, palpitations  GASTROINTESTINAL: No abdominal pain. No nausea, vomiting, or diarrhea  GENITOURINARY: No dysuria  NEUROLOGICAL: No HA, No focal weakness  SKIN: No itching, burning, rashes, or lesions   MUSCULOSKELETAL: No joint pain or swelling; No muscle, back, or extremity pain    MEDICATIONS:  ALBUTerol    90 MICROgram(s) HFA Inhaler 2 Puff(s) Inhalation every 6 hours  albuterol/ipratropium for Nebulization 3 milliLiter(s) Nebulizer every 6 hours  amLODIPine   Tablet 10 milliGRAM(s) Oral daily  aspirin  chewable 81 milliGRAM(s) Oral daily  atorvastatin 40 milliGRAM(s) Oral at bedtime  azithromycin  IVPB 500 milliGRAM(s) IV Intermittent every 24 hours  buDESOnide    Inhalation Suspension 0.5 milliGRAM(s) Inhalation every 12 hours  chlorhexidine 4% Liquid 1 Application(s) Topical <User Schedule>  enoxaparin Injectable 40 milliGRAM(s) SubCutaneous every 24 hours  insulin lispro (ADMELOG) corrective regimen sliding scale   SubCutaneous every 6 hours  lactated ringers. 1000 milliLiter(s) IV Continuous <Continuous>  methylPREDNISolone sodium succinate Injectable 40 milliGRAM(s) IV Push daily  pantoprazole  Injectable 40 milliGRAM(s) IV Push daily  propofol Infusion 20 MICROgram(s)/kG/Min IV Continuous <Continuous>      T(C): 37 (22 @ 04:00), Max: 37 (22 @ 04:00)  HR: 60 (22 @ 08:30) (52 - 82)  BP: 123/59 (22 @ 07:00) (94/50 - 151/69)  RR: 16 (22 @ 07:00) (13 - 24)  SpO2: 99% (22 @ 08:30) (90% - 100%)  Wt(kg): --Vital Signs Last 24 Hrs  T(C): 37 (2022 04:00), Max: 37 (2022 04:00)  T(F): 98.6 (2022 04:00), Max: 98.6 (2022 04:00)  HR: 60 (2022 08:30) (52 - 82)  BP: 123/59 (2022 07:00) (94/50 - 151/69)  BP(mean): 85 (2022 07:00) (68 - 117)  RR: 16 (2022 07:00) (13 - 24)  SpO2: 99% (2022 08:30) (90% - 100%)      PHYSICAL EXAM:  GENERAL: NAD, thin elderly male laying in bed.  HEAD:  Atraumatic, Normocephalic  EYES: EOMI, PERRLA, conjunctiva and sclera clear  ENMT:  Moist mucous membranes  NECK: Supple, No JVD,  CHEST/LUNG: Clear to auscultation bilaterally; No rales, rhonchi, wheezing, or rubs  HEART: Regular rate and rhythm; No murmurs, rubs, or gallops  ABDOMEN: Soft, Nontender, Nondistended; Bowel sounds present  NEURO: Alert & Oriented X3  EXTREMITIES: No LE edema, no calf tenderness  LYMPH: No lymphadenopathy noted  SKIN: No rashes or lesions    Consultant(s) Notes Reviewed:  [x ] YES  [ ] NO  Care Discussed with Consultants/Other Providers [ x] YES  [ ] NO    LABS:                        12.6   12.72 )-----------( 148      ( 2022 00:41 )             36.1     -    132<L>  |  96  |  21  ----------------------------<  122<H>  4.4   |  26  |  0.68    Ca    9.1      2022 00:40  Phos  3.1     07-  Mg     1.7     07-    TPro  6.0  /  Alb  3.4  /  TBili  1.0  /  DBili  x   /  AST  27  /  ALT  16  /  AlkPhos  47  -      Urinalysis Basic - ( 2022 13:42 )    Color: Yellow / Appearance: Clear / S.022 / pH: x  Gluc: x / Ketone: Small  / Bili: Negative / Urobili: 2 mg/dL   Blood: x / Protein: 30 mg/dL / Nitrite: Negative   Leuk Esterase: Negative / RBC: 2 /hpf / WBC 2 /HPF   Sq Epi: x / Non Sq Epi: 5 /hpf / Bacteria: Negative      CAPILLARY BLOOD GLUCOSE      POCT Blood Glucose.: 141 mg/dL (2022 05:13)  POCT Blood Glucose.: 140 mg/dL (2022 18:08)      ABG - ( 2022 00:30 )  pH, Arterial: 7.51  pH, Blood: x     /  pCO2: 40    /  pO2: 139   / HCO3: 32    / Base Excess: 8.2   /  SaO2: 99.6      Assessment and Plan:     87M PMH COPD (not on home O2), HTN, HLD, presented to the ED due to COPD exacerbation. Currently pt is sedated and intubated pending MICU transfer.    # NEUROLOGY  - Extubated. AAOx3. Off sedation.    # CARDIOLOGY  - Hx of HTN, c/w amlodipine 10 mg (pt normally on felodipine at home)  - continue to hold his 2 other home anti-hypertensive meds as pt is normotensive  - Continue to monitor BP as patient is now off sedation  - no pressor needed    # PULMONARY  - extubated on   - IV steroid for exacerbation w/ PO transition  - pulmicort .5 mg  - VBG: likely respiratory acidosis due to high CO2  - : c/w duoneb    # GASTROINTESTINAL   - OG tube; NPO w/ TF currently  - bedside swallow eval today    # RENAL/UROLOGY  - de oliveira: draining clear urine    # INFECTIOUS DISEASE  - : WBC up to 12.7 from 6.65 likely 2/2 steroids; sputum and blood cx sent  - on abx for anti-inflammatory effect: azithromycin    # ENDOCRINOLOGY  - no active issues    # HEMATOLOGY  - DVT PPX: lovenox    # ETHICS  - FULL CODE    To Dos:    -resume home carvedilol and lisinopril when needed   -resume home COPD meds tomorrow   -Dr. Modi (home pulmonologist from [facility]) will be following him in the hospital MICU DOWN GRADE NOTE    Unit: 823D  Accepting Attending: Dr. Davi Conrad      Patient is a 87y old  Male who presents with a chief complaint of COPD exacerbation (2022 07:12)      HPI: 87M PMH COPD (not on home O2), HTN, HLD, presented to the ED due to COPD exacerbation. Overnight pt showed increased work of breathing, and despite using albuterol symptoms cannot be controlled. He was brought in by his grandson. Despite putting pt on BiPAP and AVASP pt was still having increased work of breathing and was therefore intubated after being paralyzed. VBG showed worsening acidosis. CXR clear. Limited ECHO benign. COVID and RVP negative.    INTERVAL HPI: Pt was intubated on  for acute respiratory failure caused by COPD exacerbation. In the MICU, pt was started on methyprednisone 40 mg, pulmicort 0.5 mg, duonebs and azithromycin 500 mg. Pt's ABG and mentation improved and pt successfully tolerated extubation on . Pt is currently stable.       REVIEW OF SYSTEMS:  CONSTITUTIONAL: No fever, chills  HEENT:  No blurry vision No sinus or throat pain  NECK: No pain or stiffness  RESPIRATORY: No cough, wheezing, chills or hemoptysis; No shortness of breath  CARDIOVASCULAR: No chest pain, palpitations  GASTROINTESTINAL: No abdominal pain. No nausea, vomiting, or diarrhea  GENITOURINARY: No dysuria  NEUROLOGICAL: No HA, No focal weakness  SKIN: No itching, burning, rashes, or lesions   MUSCULOSKELETAL: No joint pain or swelling; No muscle, back, or extremity pain    MEDICATIONS:  ALBUTerol    90 MICROgram(s) HFA Inhaler 2 Puff(s) Inhalation every 6 hours  albuterol/ipratropium for Nebulization 3 milliLiter(s) Nebulizer every 6 hours  amLODIPine   Tablet 10 milliGRAM(s) Oral daily  aspirin  chewable 81 milliGRAM(s) Oral daily  atorvastatin 40 milliGRAM(s) Oral at bedtime  azithromycin  IVPB 500 milliGRAM(s) IV Intermittent every 24 hours  buDESOnide    Inhalation Suspension 0.5 milliGRAM(s) Inhalation every 12 hours  chlorhexidine 4% Liquid 1 Application(s) Topical <User Schedule>  enoxaparin Injectable 40 milliGRAM(s) SubCutaneous every 24 hours  insulin lispro (ADMELOG) corrective regimen sliding scale   SubCutaneous every 6 hours  lactated ringers. 1000 milliLiter(s) IV Continuous <Continuous>  methylPREDNISolone sodium succinate Injectable 40 milliGRAM(s) IV Push daily  pantoprazole  Injectable 40 milliGRAM(s) IV Push daily  propofol Infusion 20 MICROgram(s)/kG/Min IV Continuous <Continuous>      T(C): 37 (22 @ 04:00), Max: 37 (22 @ 04:00)  HR: 60 (22 @ 08:30) (52 - 82)  BP: 123/59 (22 @ 07:00) (94/50 - 151/69)  RR: 16 (22 @ 07:00) (13 - 24)  SpO2: 99% (22 @ 08:30) (90% - 100%)  Wt(kg): --Vital Signs Last 24 Hrs  T(C): 37 (2022 04:00), Max: 37 (2022 04:00)  T(F): 98.6 (2022 04:00), Max: 98.6 (2022 04:00)  HR: 60 (2022 08:30) (52 - 82)  BP: 123/59 (2022 07:00) (94/50 - 151/69)  BP(mean): 85 (2022 07:00) (68 - 117)  RR: 16 (2022 07:00) (13 - 24)  SpO2: 99% (2022 08:30) (90% - 100%)      PHYSICAL EXAM:  GENERAL: NAD, thin elderly male laying in bed.  HEAD:  Atraumatic, Normocephalic  EYES: EOMI, PERRLA, conjunctiva and sclera clear  ENMT:  Moist mucous membranes  NECK: Supple, No JVD,  CHEST/LUNG: Clear to auscultation bilaterally; No rales, rhonchi, wheezing, or rubs  HEART: Regular rate and rhythm; No murmurs, rubs, or gallops  ABDOMEN: Soft, Nontender, Nondistended; Bowel sounds present  NEURO: Alert & Oriented X3  EXTREMITIES: No LE edema, no calf tenderness  LYMPH: No lymphadenopathy noted  SKIN: No rashes or lesions    Consultant(s) Notes Reviewed:  [x ] YES  [ ] NO  Care Discussed with Consultants/Other Providers [ x] YES  [ ] NO    LABS:                        12.6   12.72 )-----------( 148      ( 2022 00:41 )             36.1     -    132<L>  |  96  |  21  ----------------------------<  122<H>  4.4   |  26  |  0.68    Ca    9.1      2022 00:40  Phos  3.1     -  Mg     1.7     -    TPro  6.0  /  Alb  3.4  /  TBili  1.0  /  DBili  x   /  AST  27  /  ALT  16  /  AlkPhos  47        Urinalysis Basic - ( 2022 13:42 )    Color: Yellow / Appearance: Clear / S.022 / pH: x  Gluc: x / Ketone: Small  / Bili: Negative / Urobili: 2 mg/dL   Blood: x / Protein: 30 mg/dL / Nitrite: Negative   Leuk Esterase: Negative / RBC: 2 /hpf / WBC 2 /HPF   Sq Epi: x / Non Sq Epi: 5 /hpf / Bacteria: Negative      CAPILLARY BLOOD GLUCOSE      POCT Blood Glucose.: 141 mg/dL (2022 05:13)  POCT Blood Glucose.: 140 mg/dL (2022 18:08)      ABG - ( 2022 00:30 )  pH, Arterial: 7.51  pH, Blood: x     /  pCO2: 40    /  pO2: 139   / HCO3: 32    / Base Excess: 8.2   /  SaO2: 99.6      Assessment and Plan:     87M PMH COPD (not on home O2), HTN, HLD, presented to the ED due to COPD exacerbation. Currently pt is sedated and intubated pending MICU transfer.    # NEUROLOGY  - Extubated. AAOx3. Off sedation.    # CARDIOLOGY  - Hx of HTN, c/w amlodipine 10 mg (pt normally on felodipine at home)  - continue to hold his 2 other home anti-hypertensive meds as pt is normotensive  - Continue to monitor BP as patient is now off sedation  - no pressor needed    # PULMONARY  - extubated on   - IV steroid for exacerbation w/ PO transition  - pulmicort .5 mg  - VBG: likely respiratory acidosis due to high CO2  - : c/w duoneb    # GASTROINTESTINAL   - OG tube; NPO w/ TF currently  - bedside swallow eval today    # RENAL/UROLOGY  - de oliveira: draining clear urine    # INFECTIOUS DISEASE  - : WBC up to 12.7 from 6.65 likely 2/2 steroids; sputum and blood cx sent  - on abx for anti-inflammatory effect: azithromycin    # ENDOCRINOLOGY  - no active issues    # HEMATOLOGY  - DVT PPX: lovenox    # ETHICS  - FULL CODE    To Dos:    -resume home carvedilol and lisinopril when needed   -resume home COPD meds tomorrow   -Dr. Modi (home pulmonologist from [facility]) will be following him in the hospital  -please update son (who lives in [place]) with discharge plans when available

## 2022-07-09 DIAGNOSIS — E87.1 HYPO-OSMOLALITY AND HYPONATREMIA: ICD-10-CM

## 2022-07-09 DIAGNOSIS — E16.2 HYPOGLYCEMIA, UNSPECIFIED: ICD-10-CM

## 2022-07-09 DIAGNOSIS — J44.1 CHRONIC OBSTRUCTIVE PULMONARY DISEASE WITH (ACUTE) EXACERBATION: ICD-10-CM

## 2022-07-09 DIAGNOSIS — I10 ESSENTIAL (PRIMARY) HYPERTENSION: ICD-10-CM

## 2022-07-09 DIAGNOSIS — R09.89 OTHER SPECIFIED SYMPTOMS AND SIGNS INVOLVING THE CIRCULATORY AND RESPIRATORY SYSTEMS: ICD-10-CM

## 2022-07-09 DIAGNOSIS — E78.5 HYPERLIPIDEMIA, UNSPECIFIED: ICD-10-CM

## 2022-07-09 LAB
A1C WITH ESTIMATED AVERAGE GLUCOSE RESULT: 6.1 % — HIGH (ref 4–5.6)
ALBUMIN SERPL ELPH-MCNC: 3.3 G/DL — SIGNIFICANT CHANGE UP (ref 3.3–5)
ALP SERPL-CCNC: 49 U/L — SIGNIFICANT CHANGE UP (ref 40–120)
ALT FLD-CCNC: 17 U/L — SIGNIFICANT CHANGE UP (ref 10–45)
ANION GAP SERPL CALC-SCNC: 7 MMOL/L — SIGNIFICANT CHANGE UP (ref 5–17)
AST SERPL-CCNC: 29 U/L — SIGNIFICANT CHANGE UP (ref 10–40)
BILIRUB SERPL-MCNC: 0.5 MG/DL — SIGNIFICANT CHANGE UP (ref 0.2–1.2)
BUN SERPL-MCNC: 21 MG/DL — SIGNIFICANT CHANGE UP (ref 7–23)
CALCIUM SERPL-MCNC: 9.1 MG/DL — SIGNIFICANT CHANGE UP (ref 8.4–10.5)
CHLORIDE SERPL-SCNC: 99 MMOL/L — SIGNIFICANT CHANGE UP (ref 96–108)
CO2 SERPL-SCNC: 34 MMOL/L — HIGH (ref 22–31)
CREAT SERPL-MCNC: 0.73 MG/DL — SIGNIFICANT CHANGE UP (ref 0.5–1.3)
EGFR: 89 ML/MIN/1.73M2 — SIGNIFICANT CHANGE UP
ESTIMATED AVERAGE GLUCOSE: 128 MG/DL — HIGH (ref 68–114)
GLUCOSE BLDC GLUCOMTR-MCNC: 101 MG/DL — HIGH (ref 70–99)
GLUCOSE BLDC GLUCOMTR-MCNC: 103 MG/DL — HIGH (ref 70–99)
GLUCOSE BLDC GLUCOMTR-MCNC: 112 MG/DL — HIGH (ref 70–99)
GLUCOSE BLDC GLUCOMTR-MCNC: 151 MG/DL — HIGH (ref 70–99)
GLUCOSE BLDC GLUCOMTR-MCNC: 58 MG/DL — LOW (ref 70–99)
GLUCOSE BLDC GLUCOMTR-MCNC: 61 MG/DL — LOW (ref 70–99)
GLUCOSE BLDC GLUCOMTR-MCNC: 81 MG/DL — SIGNIFICANT CHANGE UP (ref 70–99)
GLUCOSE BLDC GLUCOMTR-MCNC: 87 MG/DL — SIGNIFICANT CHANGE UP (ref 70–99)
GLUCOSE SERPL-MCNC: 80 MG/DL — SIGNIFICANT CHANGE UP (ref 70–99)
HCT VFR BLD CALC: 40.1 % — SIGNIFICANT CHANGE UP (ref 39–50)
HGB BLD-MCNC: 13.2 G/DL — SIGNIFICANT CHANGE UP (ref 13–17)
INR BLD: 1.09 RATIO — SIGNIFICANT CHANGE UP (ref 0.88–1.16)
MAGNESIUM SERPL-MCNC: 1.8 MG/DL — SIGNIFICANT CHANGE UP (ref 1.6–2.6)
MCHC RBC-ENTMCNC: 32 PG — SIGNIFICANT CHANGE UP (ref 27–34)
MCHC RBC-ENTMCNC: 32.9 GM/DL — SIGNIFICANT CHANGE UP (ref 32–36)
MCV RBC AUTO: 97.1 FL — SIGNIFICANT CHANGE UP (ref 80–100)
NRBC # BLD: 0 /100 WBCS — SIGNIFICANT CHANGE UP (ref 0–0)
PHOSPHATE SERPL-MCNC: 3.7 MG/DL — SIGNIFICANT CHANGE UP (ref 2.5–4.5)
PLATELET # BLD AUTO: 166 K/UL — SIGNIFICANT CHANGE UP (ref 150–400)
POTASSIUM SERPL-MCNC: 5 MMOL/L — SIGNIFICANT CHANGE UP (ref 3.5–5.3)
POTASSIUM SERPL-SCNC: 5 MMOL/L — SIGNIFICANT CHANGE UP (ref 3.5–5.3)
PROT SERPL-MCNC: 5.9 G/DL — LOW (ref 6–8.3)
PROTHROM AB SERPL-ACNC: 12.6 SEC — SIGNIFICANT CHANGE UP (ref 10.5–13.4)
RBC # BLD: 4.13 M/UL — LOW (ref 4.2–5.8)
RBC # FLD: 13.9 % — SIGNIFICANT CHANGE UP (ref 10.3–14.5)
SODIUM SERPL-SCNC: 140 MMOL/L — SIGNIFICANT CHANGE UP (ref 135–145)
WBC # BLD: 10.64 K/UL — HIGH (ref 3.8–10.5)
WBC # FLD AUTO: 10.64 K/UL — HIGH (ref 3.8–10.5)

## 2022-07-09 PROCEDURE — 31505 DIAGNOSTIC LARYNGOSCOPY: CPT

## 2022-07-09 PROCEDURE — 71045 X-RAY EXAM CHEST 1 VIEW: CPT | Mod: 26

## 2022-07-09 PROCEDURE — 99233 SBSQ HOSP IP/OBS HIGH 50: CPT

## 2022-07-09 RX ORDER — DEXTROSE 50 % IN WATER 50 %
12.5 SYRINGE (ML) INTRAVENOUS ONCE
Refills: 0 | Status: DISCONTINUED | OUTPATIENT
Start: 2022-07-09 | End: 2022-07-15

## 2022-07-09 RX ORDER — GLUCAGON INJECTION, SOLUTION 0.5 MG/.1ML
1 INJECTION, SOLUTION SUBCUTANEOUS ONCE
Refills: 0 | Status: COMPLETED | OUTPATIENT
Start: 2022-07-09 | End: 2022-07-09

## 2022-07-09 RX ORDER — DEXTROSE 50 % IN WATER 50 %
25 SYRINGE (ML) INTRAVENOUS ONCE
Refills: 0 | Status: DISCONTINUED | OUTPATIENT
Start: 2022-07-09 | End: 2022-07-15

## 2022-07-09 RX ORDER — DEXTROSE 50 % IN WATER 50 %
12.5 SYRINGE (ML) INTRAVENOUS ONCE
Refills: 0 | Status: COMPLETED | OUTPATIENT
Start: 2022-07-09 | End: 2022-07-09

## 2022-07-09 RX ORDER — SODIUM CHLORIDE 9 MG/ML
1000 INJECTION, SOLUTION INTRAVENOUS
Refills: 0 | Status: DISCONTINUED | OUTPATIENT
Start: 2022-07-09 | End: 2022-07-12

## 2022-07-09 RX ORDER — GLUCAGON INJECTION, SOLUTION 0.5 MG/.1ML
1 INJECTION, SOLUTION SUBCUTANEOUS ONCE
Refills: 0 | Status: COMPLETED | OUTPATIENT
Start: 2022-07-09

## 2022-07-09 RX ORDER — DEXTROSE 50 % IN WATER 50 %
15 SYRINGE (ML) INTRAVENOUS ONCE
Refills: 0 | Status: DISCONTINUED | OUTPATIENT
Start: 2022-07-09 | End: 2022-07-15

## 2022-07-09 RX ORDER — PANTOPRAZOLE SODIUM 20 MG/1
40 TABLET, DELAYED RELEASE ORAL DAILY
Refills: 0 | Status: DISCONTINUED | OUTPATIENT
Start: 2022-07-09 | End: 2022-07-10

## 2022-07-09 RX ADMIN — AMLODIPINE BESYLATE 10 MILLIGRAM(S): 2.5 TABLET ORAL at 06:17

## 2022-07-09 RX ADMIN — Medication 3 MILLILITER(S): at 17:46

## 2022-07-09 RX ADMIN — Medication 3 MILLILITER(S): at 06:17

## 2022-07-09 RX ADMIN — Medication 12.5 GRAM(S): at 18:35

## 2022-07-09 RX ADMIN — ENOXAPARIN SODIUM 40 MILLIGRAM(S): 100 INJECTION SUBCUTANEOUS at 17:45

## 2022-07-09 RX ADMIN — Medication 3 MILLILITER(S): at 00:12

## 2022-07-09 RX ADMIN — SODIUM CHLORIDE 50 MILLILITER(S): 9 INJECTION, SOLUTION INTRAVENOUS at 20:48

## 2022-07-09 RX ADMIN — Medication 3 MILLILITER(S): at 12:32

## 2022-07-09 RX ADMIN — Medication 600 MILLIGRAM(S): at 00:35

## 2022-07-09 RX ADMIN — Medication 40 MILLIGRAM(S): at 06:20

## 2022-07-09 RX ADMIN — AZITHROMYCIN 255 MILLIGRAM(S): 500 TABLET, FILM COATED ORAL at 18:26

## 2022-07-09 RX ADMIN — GLUCAGON INJECTION, SOLUTION 1 MILLIGRAM(S): 0.5 INJECTION, SOLUTION SUBCUTANEOUS at 20:48

## 2022-07-09 RX ADMIN — Medication 81 MILLIGRAM(S): at 12:32

## 2022-07-09 RX ADMIN — PANTOPRAZOLE SODIUM 40 MILLIGRAM(S): 20 TABLET, DELAYED RELEASE ORAL at 17:46

## 2022-07-09 RX ADMIN — Medication 0.5 MILLIGRAM(S): at 06:17

## 2022-07-09 RX ADMIN — Medication 0.5 MILLIGRAM(S): at 17:46

## 2022-07-09 RX ADMIN — Medication 12.5 GRAM(S): at 00:10

## 2022-07-09 RX ADMIN — ATORVASTATIN CALCIUM 40 MILLIGRAM(S): 80 TABLET, FILM COATED ORAL at 22:59

## 2022-07-09 NOTE — DIETITIAN INITIAL EVALUATION ADULT - SIGNS/SYMPTOMS
moderate to severe body fat and muscle mass depletion; PO intake </=75% x >/=1 month mod/severe body fat and muscle mass depletion; PO intake </=75% x >/=1 month, wt loss >10% x >/=6 mo

## 2022-07-09 NOTE — DIETITIAN INITIAL EVALUATION ADULT - NS FNS REASON FOR WEIGHT CHANG
presumed decreased PO intake with subsequent increased nutrient needs - concern for inadequate energy intake

## 2022-07-09 NOTE — CHART NOTE - NSCHARTNOTEFT_GEN_A_CORE
Pt recently downgraded from MICU following respiratory faliure secondary to COPD exacerbation  This afternoon patient stated he ate and a chicken bone stuck at his throat and now he cannot swallow  Mouth examined clean oral cavity and no visible food near the oropharynx that could be retrieved with suction catheter  O2 sat 97%  A/P food stuck in throat  ENT eval  Keep suction catheter at bedside  Speech and swallow eval   Change diet to mechanical soft until cleared by S/S Pt recently downgraded from MICU following respiratory faliure secondary to COPD exacerbation  This afternoon patient stated he ate was eating chicken and noted food stuck in his throat and now he cannot swallow  Mouth examined clean oral cavity and no visible food near the oropharynx that could be retrieved with suction catheter. Pt with a lot of mucous but voice is cleared   O2 sat 97%  A/P food stuck in throat  ENT eval  Keep suction catheter at bedside  Speech and swallow eval   Change diet to soft and bite size until cleared by S/S

## 2022-07-09 NOTE — PHYSICAL THERAPY INITIAL EVALUATION ADULT - GENERAL OBSERVATIONS, REHAB EVAL
Pt received semi-supine in bed, A&Ox4, +2L o2 via NC, +cont pulse ox, +tele, frannie 45 min PT eval.

## 2022-07-09 NOTE — PROGRESS NOTE ADULT - ASSESSMENT
87M PMH COPD (not on home O2), HTN, HLD, presented to the ED due to COPD exacerbation.     # NEUROLOGY  - Extubated. AAOx3. Off sedation.    # CARDIOLOGY  - Hx of HTN, currently holding anti-hypertensives  - Continue to monitor BP as patient is now off sedation  - no pressor needed    # PULMONARY  - ventilation status: 14/450/5/50% in the ED  - IV steroid for exacerbation w/ PO transition  - VBG: likely respiratory acidosis due to high CO2  - 7/8: c/w duoneb    # GASTROINTESTINAL   - diet: consistent carb diet    # RENAL/UROLOGY  - de oliveira: draining clear urine    # INFECTIOUS DISEASE  - 7/8: WBC up to 12.7 from 6.65 likely 2/2 steroids; sputum and blood cx sent  - on abx: azithromycin    # ENDOCRINOLOGY  - no active issues    # HEMATOLOGY  - DVT PPX: lovenox    # ETHICS  - FULL CODE     Assessment and Plan:   · Assessment	  Impression:  COPD exacerbation requiring intubation, though the specific trigger for his exacerbation is not entirely clear.  A low grade infection, with changes in weather and possibly low grade aspiration may be playing a role, though other causes cannot be excluded.    Plan:    Continuing supplemental O2 to maintain saturations 92% or above watching for evidence of CO2 retention  Maintaining current nebulizer regimen  Prednsione 40 mg daily (as long as he can tolerate oral medication), with a cautious taper.  We will have a low threshold for additional antibiotic therapy.  Encouraging secretion clearance, pulmonary toilet prn.   Incentive spirometry.  Elevating HOB  Judicious CV/fluid management.  Guaifenesin dm cough syrup  Can add Mucinex needs (and if able to swallow adequately).  Will check LE dopplers.  Aspiration precautions.  GI and speech and swallowing input will be helpful.  Mobilization as is feasible.  Routine GI and DVT prophylaxis.    Discussed with the patient, the patient's grandchildren, and the [facility] staff in detail.     Thank you for this consult.  Will follow.

## 2022-07-09 NOTE — PROVIDER CONTACT NOTE (OTHER) - ASSESSMENT
pt complaining of food being stuck in his throat.
A&OX4. VSS. Pt blood sugar now 76. Before, pt was feeling sweaty and cold. Now states he feels fine but I felt nervous because even after drinking 4 cups of apple juice and 12.5mg dextrose IV it only went up to 76.

## 2022-07-09 NOTE — PROVIDER CONTACT NOTE (OTHER) - SITUATION
pt with complaints of chicken piece stuck in the throat with lunch meal. pt on 2L NC sats at 98%.
Pt had hypoglycemia - first was 62 and then he drank 3 cups apple juice. After rechecking, only 69 so gave 12.5 dextrose IV. After rechecking only went up to 76.

## 2022-07-09 NOTE — PROGRESS NOTE ADULT - ASSESSMENT
87M PMH COPD (not on home O2), HTN, HLD, a/w acute hypercarbic respiratory failure due to COPD exacerbation.

## 2022-07-09 NOTE — PROGRESS NOTE ADULT - SUBJECTIVE AND OBJECTIVE BOX
Follow-up Pulm Progress Note    The patient was seen and examined. Notes reviewed and discussed with staff/team as applicable      No new respiratory events overnight.      Denies: SOB, Chest pain, increased cough, colored phlegm, hemoptysis, N/V/D, neck stiffness, dysuria  ROS otherwise within normal limits    Vital Signs Last 24 Hrs  T(C): 36.4 (09 Jul 2022 16:00), Max: 36.4 (09 Jul 2022 16:00)  T(F): 97.6 (09 Jul 2022 16:00), Max: 97.6 (09 Jul 2022 16:00)  HR: 77 (09 Jul 2022 16:00) (52 - 77)  BP: 124/61 (09 Jul 2022 16:00) (119/60 - 142/70)  BP(mean): --  RR: 18 (09 Jul 2022 16:00) (18 - 18)  SpO2: 97% (09 Jul 2022 16:00) (97% - 100%)    Parameters below as of 09 Jul 2022 16:00  Patient On (Oxygen Delivery Method): nasal cannula  O2 Flow (L/min): 2      ABG - ( 08 Jul 2022 00:30 )  pH, Arterial: 7.51  pH, Blood: x     /  pCO2: 40    /  pO2: 139   / HCO3: 32    / Base Excess: 8.2   /  SaO2: 99.6                  07-08 @ 07:01  -  07-09 @ 07:00  --------------------------------------------------------  IN: 500 mL / OUT: 1150 mL / NET: -650 mL          Medications:  MEDICATIONS  (STANDING):  ALBUTerol    90 MICROgram(s) HFA Inhaler 2 Puff(s) Inhalation every 6 hours  albuterol/ipratropium for Nebulization 3 milliLiter(s) Nebulizer every 6 hours  amLODIPine   Tablet 10 milliGRAM(s) Oral daily  aspirin  chewable 81 milliGRAM(s) Oral daily  atorvastatin 40 milliGRAM(s) Oral at bedtime  azithromycin  IVPB 500 milliGRAM(s) IV Intermittent every 24 hours  buDESOnide    Inhalation Suspension 0.5 milliGRAM(s) Inhalation every 12 hours  dextrose 50% Injectable 25 Gram(s) IV Push once  dextrose 50% Injectable 12.5 Gram(s) IV Push once  dextrose 50% Injectable 25 Gram(s) IV Push once  dextrose Oral Gel 15 Gram(s) Oral once  enoxaparin Injectable 40 milliGRAM(s) SubCutaneous every 24 hours  glucagon  Injectable 1 milliGRAM(s) IntraMuscular once  insulin lispro (ADMELOG) corrective regimen sliding scale   SubCutaneous every 6 hours  pantoprazole  Injectable 40 milliGRAM(s) IV Push daily    MEDICATIONS  (PRN):      Allergies    No Known Allergies    Intolerances        Vent settings (if applicable)        Physical Examination:    Pleasant  Neck: no JVD, LAD, accessory muscle use  PULM: Clear to auscultation bilaterally, no wheezes, rales, rhonchi  CVS: Regular rate and rhythm, S1S2, no murmurs, rubs, or gallops  Abdomen:  Extremities:  Neuro:      LABS:                        13.2   10.64 )-----------( 166      ( 09 Jul 2022 07:13 )             40.1     07-09    140  |  99  |  21  ----------------------------<  80  5.0   |  34<H>  |  0.73    Ca    9.1      09 Jul 2022 07:11  Phos  3.7     07-09  Mg     1.8     07-09    TPro  5.9<L>  /  Alb  3.3  /  TBili  0.5  /  DBili  x   /  AST  29  /  ALT  17  /  AlkPhos  49  07-09          CAPILLARY BLOOD GLUCOSE      POCT Blood Glucose.: 61 mg/dL (09 Jul 2022 18:13)    PT/INR - ( 09 Jul 2022 07:14 )   PT: 12.6 sec;   INR: 1.09 ratio               Serum Pro-Brain Natriuretic Peptide: 216 pg/mL (07-07-22 @ 06:26)      CULTURES:  Culture Results:   Normal Respiratory Maggi present (07-08 @ 07:05)  Culture Results:   No growth to date. (07-07 @ 13:27)  Culture Results:   No growth to date. (07-07 @ 13:23)    Most recent blood culture -- 07-08 @ 07:05   -- -- .Sputum Sputum 07-08 @ 07:05  Most recent blood culture -- 07-07 @ 13:27   -- -- .Blood Blood 07-07 @ 13:27  Most recent blood culture -- 07-07 @ 13:23   -- -- .Blood Blood 07-07 @ 13:23      RADIOLOGY REVIEWED    CXR:      CT chest:      Other:   Pulm Consultation Note    Pt had been admitted to the ICU service, transferred now to [address] [place] [address].    Mr. Delacruz in an 87 year old male, and is welll known to our group, followed by Dr. Juan Manuel Modi in our office.  He was admitted on July 7 when he presented to the ER in very severe respiratory distress, not alleviated by multiple albuterol treatments.  He was found to be in acute respiratory failure, he was started  Bipap and AVASP, but remained extremely dyspnea and required intubation and neuromuscular relaxation. Of note, he was found to be Covid negative, a bedside echo reported showed no concerning abnormalities and his CxR showed no infiltrates or effusions.  He is an ex-smoker with a history of COPD, and has been maintained on Brovana and Yupelri via nebulizer along with an albuterol hfa inhaler as an outpatient.  His symptoms of dyspnea can rapidly worsen at times.  He has a history of multiple other medical problems including coronary artery disease.   He was last in our office in April of this year and he was in a wheelchair.    During the current admission, he was given with IV steroids, antibiotics, and nebulizer treatments.  He was extubated on June 8 and was transferred out of the ICU today.  Currently he reports that he is breathing more comfortably, though he gagged on some food earlier with a persistent sensation that the food was stuck in his throat.   ENT evaluation revealed no evidence of retained food in the posterior oropharynx and no other abnormalities of the upper airway were noted.  Speech and swallowing and gastroenterology evaluations were requested. He had some pedal edema over the past few weeks which reportedly has decreased.   The patient reports that he is feeling much better presently, though his dyspnea is not completely back to baseline.   He notes a cough, occasionally productive of off white/yellow sputum, no chest pain, he does report that his throat feels somewhat better than earlier today.      ROS:  as per HPI, otherwise, unremarkable/non-contributory.    Ex smoker-110 pk years last cig 1999  No recent Etoh    PMHx inc:  CAD (medically managed)  HTN  HLD  Prostate ca (rx with external radiation and seeds)  lower GIB  Inguinal hernias bilaterally    Surg hx inc  Bilateral inguinal hernia repair      Denies: SOB, Chest pain, increased cough, colored phlegm, hemoptysis, N/V/D, neck stiffness, dysuria  ROS otherwise within normal limits    Vital Signs Last 24 Hrs  T(C): 36.4 (09 Jul 2022 16:00), Max: 36.4 (09 Jul 2022 16:00)  T(F): 97.6 (09 Jul 2022 16:00), Max: 97.6 (09 Jul 2022 16:00)  HR: 77 (09 Jul 2022 16:00) (52 - 77)  BP: 124/61 (09 Jul 2022 16:00) (119/60 - 142/70)  BP(mean): --  RR: 18 (09 Jul 2022 16:00) (18 - 18)  SpO2: 97% (09 Jul 2022 16:00) (97% - 100%)    Parameters below as of 09 Jul 2022 16:00  Patient On (Oxygen Delivery Method): nasal cannula  O2 Flow (L/min): 2      ABG - ( 08 Jul 2022 00:30 )  pH, Arterial: 7.51  pH, Blood: x     /  pCO2: 40    /  pO2: 139   / HCO3: 32    / Base Excess: 8.2   /  SaO2: 99.6                  07-08 @ 07:01  -  07-09 @ 07:00  --------------------------------------------------------  IN: 500 mL / OUT: 1150 mL / NET: -650 mL          Medications:  MEDICATIONS  (STANDING):  ALBUTerol    90 MICROgram(s) HFA Inhaler 2 Puff(s) Inhalation every 6 hours  albuterol/ipratropium for Nebulization 3 milliLiter(s) Nebulizer every 6 hours  amLODIPine   Tablet 10 milliGRAM(s) Oral daily  aspirin  chewable 81 milliGRAM(s) Oral daily  atorvastatin 40 milliGRAM(s) Oral at bedtime  azithromycin  IVPB 500 milliGRAM(s) IV Intermittent every 24 hours  buDESOnide    Inhalation Suspension 0.5 milliGRAM(s) Inhalation every 12 hours  dextrose 50% Injectable 25 Gram(s) IV Push once  dextrose 50% Injectable 12.5 Gram(s) IV Push once  dextrose 50% Injectable 25 Gram(s) IV Push once  dextrose Oral Gel 15 Gram(s) Oral once  enoxaparin Injectable 40 milliGRAM(s) SubCutaneous every 24 hours  glucagon  Injectable 1 milliGRAM(s) IntraMuscular once  insulin lispro (ADMELOG) corrective regimen sliding scale   SubCutaneous every 6 hours  pantoprazole  Injectable 40 milliGRAM(s) IV Push daily    MEDICATIONS  (PRN):      Allergies    No Known Allergies    Intolerances        Vent settings (if applicable)        Physical Examination:      Pleasant  Neck: prominent EJ, without accessory muscle use  PULM: prolonged expiratory phase, occ high pitched rhonchi, decreased breath sounds at the bases  CVS: Regular rate without obvious rub or gallop  Abdomen: soft, without marked distension or tenderness, normoactive bowel sounds  Extremities: arthritic changes without marked edema, cyanosis, tenderness or clubbing  Neuro: alert, appropriately responsive to simple questions, grossly non-focal      LABS:                        13.2   10.64 )-----------( 166      ( 09 Jul 2022 07:13 )             40.1     07-09    140  |  99  |  21  ----------------------------<  80  5.0   |  34<H>  |  0.73    Ca    9.1      09 Jul 2022 07:11  Phos  3.7     07-09  Mg     1.8     07-09    TPro  5.9<L>  /  Alb  3.3  /  TBili  0.5  /  DBili  x   /  AST  29  /  ALT  17  /  AlkPhos  49  07-09          CAPILLARY BLOOD GLUCOSE      POCT Blood Glucose.: 61 mg/dL (09 Jul 2022 18:13)    PT/INR - ( 09 Jul 2022 07:14 )   PT: 12.6 sec;   INR: 1.09 ratio               Serum Pro-Brain Natriuretic Peptide: 216 pg/mL (07-07-22 @ 06:26)      CULTURES:  Culture Results:   Normal Respiratory Maggi present (07-08 @ 07:05)  Culture Results:   No growth to date. (07-07 @ 13:27)  Culture Results:   No growth to date. (07-07 @ 13:23)    Most recent blood culture -- 07-08 @ 07:05   -- -- .Sputum Sputum 07-08 @ 07:05  Most recent blood culture -- 07-07 @ 13:27   -- -- .Blood Blood 07-07 @ 13:27  Most recent blood culture -- 07-07 @ 13:23   -- -- .Blood Blood 07-07 @ 13:23      RADIOLOGY REVIEWED    CXR:      CXR:   PROCEDURE DATE:  07/07/2022          INTERPRETATION:  CLINICAL INFORMATION: evaluate ET tube and NG.    TECHNIQUE: Frontal radiograph of the chest from 7/7/2022 at 1:30 PM and   2:08 PM.    COMPARISON: 7/7/2022 at 11:00 AM.    FINDINGS:    7/7/2022 at 1:30 PM radiograph(s):    LINES/TUBES: Endotracheal tube terminates mid trachea. Enteric tube   terminates in the stomach.    LUNGS: The lungs are clear.    PLEURA: No pleural effusion or pneumothorax.    HEART AND MEDIASTINUM: Heart size is within normal limits.    SKELETON: Degenerative changes.    7/7/2022 at 2:08 PM radiograph(s):    No intervalchange.      IMPRESSION:    7/7/2022 at 1:30 PM radiograph: Endotracheal tube terminates mid trachea.   Enteric tube terminates in the stomach. Clear lungs.    7/7/2022 at 2:08 PM radiograph: No interval change.    --- End of Report ---          VEENA DESAI MD; Resident Radiology  This document has been electronically signed.  GIA VAZQUEZ MD; Attending Radiologist  This document has been electronically signed. Jul 7 2022  9:34PM    ACC: [id number] EXAM:  XR CHEST PORTABLE URGENT 1V                          PROCEDURE DATE:  07/09/2022          INTERPRETATION:  TECHNIQUE: A single AP view of the chest was obtained.   Ordered time:   7/9/2022 6:08 PM    COMPARISON: 7/7/2022    CLINICAL INFORMATION: Food stuck in throat    FINDINGS:  Is been interval extubation and removal of the NG tube.  The heart is normal in size.  The lungs are hyperaerated, but clear.  There are no pleural effusions.  There is no pneumothorax.    IMPRESSION:    No acute pulmonary disease    --- End of Report ---            GIA VAZQUEZ MD; Attending Radiologist  This document has been electronically signed. Jul 10 2022 12:02PM          CT chest:      Other:

## 2022-07-09 NOTE — CONSULT NOTE ADULT - ASSESSMENT
87y with sensation of chicken stuck in throat, w difficulty swallowing secretions. indirect laryngoscopy was clear for any foreign body, attempted esophagoscopy, however unable to visualize.

## 2022-07-09 NOTE — DIETITIAN INITIAL EVALUATION ADULT - NSFNSGIIOFT_GEN_A_CORE
-Pt previously prescribed EN feeds, stopped s/p extubation and PO diet now initiated.   -Pt previously prescribed Lactated Ringers - discontinued 7/8.

## 2022-07-09 NOTE — CONSULT NOTE ADULT - ATTENDING COMMENTS
86 yo M with h/o COPD, HTN, HLD, p/w acute hypercarbic respiratory failure due to COPD exacerbation. GI called to evaluate pt this evening (7/9) after pt had chicken for lunch and felt dysphagia afterwards, was evaluated by ENT with laryngoscopy, no findings  This AM, patient had scrambled eggs and had an episode of vomiting  At bedside examination this morning, noted patient to be spitting up his secretions, inability to clear with complaints of retrosternal chest pain, inability to tolerate sips of water without regurgitation, symptoms consistent with acute esophageal obstruction secondary to food impaction  '  Rec  1- NPO  2- EGD emergently as patient high risk for aspiration

## 2022-07-09 NOTE — PHYSICAL THERAPY INITIAL EVALUATION ADULT - PLANNED THERAPY INTERVENTIONS, PT EVAL
GOAL: Pt will improve bilateral LE strength to _, for increased limb stability, to improve gait and facilitate stair negotiation in 2 weeks./bed mobility training/gait training/strengthening/transfer training

## 2022-07-09 NOTE — DIETITIAN INITIAL EVALUATION ADULT - PERTINENT MEDS FT
MEDICATIONS  (STANDING):  ALBUTerol    90 MICROgram(s) HFA Inhaler 2 Puff(s) Inhalation every 6 hours  albuterol/ipratropium for Nebulization 3 milliLiter(s) Nebulizer every 6 hours  amLODIPine   Tablet 10 milliGRAM(s) Oral daily  aspirin  chewable 81 milliGRAM(s) Oral daily  atorvastatin 40 milliGRAM(s) Oral at bedtime  azithromycin  IVPB 500 milliGRAM(s) IV Intermittent every 24 hours  buDESOnide    Inhalation Suspension 0.5 milliGRAM(s) Inhalation every 12 hours  enoxaparin Injectable 40 milliGRAM(s) SubCutaneous every 24 hours  insulin lispro (ADMELOG) corrective regimen sliding scale   SubCutaneous every 6 hours    MEDICATIONS  (PRN):

## 2022-07-09 NOTE — DIETITIAN INITIAL EVALUATION ADULT - NS FNS DIET ORDER
Diet, Consistent Carbohydrate w/Evening Snack:   DASH/TLC {Sodium & Cholesterol Restricted} (DASH) (07-08-22 @ 14:08)

## 2022-07-09 NOTE — PROGRESS NOTE ADULT - SUBJECTIVE AND OBJECTIVE BOX
[facility] Division of Hospital Medicine  Esther Waldron DO  8a-5pm: [phone]  after 5pm call [phone]    Patient is a 87y old  Male who presents with a chief complaint of COPD exacerbation (2022 07:12)        SUBJECTIVE / OVERNIGHT EVENTS: no acute complaints       MEDICATIONS  (STANDING):  ALBUTerol    90 MICROgram(s) HFA Inhaler 2 Puff(s) Inhalation every 6 hours  albuterol/ipratropium for Nebulization 3 milliLiter(s) Nebulizer every 6 hours  amLODIPine   Tablet 10 milliGRAM(s) Oral daily  aspirin  chewable 81 milliGRAM(s) Oral daily  atorvastatin 40 milliGRAM(s) Oral at bedtime  azithromycin  IVPB 500 milliGRAM(s) IV Intermittent every 24 hours  buDESOnide    Inhalation Suspension 0.5 milliGRAM(s) Inhalation every 12 hours  enoxaparin Injectable 40 milliGRAM(s) SubCutaneous every 24 hours  insulin lispro (ADMELOG) corrective regimen sliding scale   SubCutaneous every 6 hours    MEDICATIONS  (PRN):      Vital Signs Last 24 Hrs  T(C): 36.3 (2022 08:08), Max: 36.6 (2022 11:00)  T(F): 97.3 (2022 08:08), Max: 97.9 (2022 11:00)  HR: 74 (2022 08:08) (52 - 74)  BP: 119/60 (2022 08:08) (97/55 - 134/64)  BP(mean): 77 (2022 17:00) (73 - 92)  RR: 18 (2022 08:08) (16 - 34)  SpO2: 97% (2022 08:08) (88% - 100%)    Parameters below as of 2022 08:08  Patient On (Oxygen Delivery Method): nasal cannula  O2 Flow (L/min): 2    CAPILLARY BLOOD GLUCOSE      POCT Blood Glucose.: 81 mg/dL (2022 05:59)  POCT Blood Glucose.: 87 mg/dL (2022 01:19)  POCT Blood Glucose.: 76 mg/dL (2022 23:16)  POCT Blood Glucose.: 69 mg/dL (2022 23:12)  POCT Blood Glucose.: 60 mg/dL (2022 22:17)  POCT Blood Glucose.: 49 mg/dL (2022 22:16)  POCT Blood Glucose.: 62 mg/dL (2022 21:21)  POCT Blood Glucose.: 107 mg/dL (2022 13:58)    I&O's Summary    2022 07:01  -  2022 07:00  --------------------------------------------------------  IN: 500 mL / OUT: 1150 mL / NET: -650 mL        PHYSICAL EXAM:  GENERAL: NAD, breathing normal  HEAD:  Atraumatic, Normocephalic  EYES: conjunctiva and sclera clear  NECK: supple, No JVD  CHEST/LUNG: CTA b/l  HEART: S1 S2 RRR  ABDOMEN: +BS Soft, NT/ND  EXTREMITIES:  2+ DP Pulses, No c/c/e  NEUROLOGY: AAOx3, no facial droop, no focal deficits   SKIN: No rashes or lesions    LABS:                        13.2   10.64 )-----------( 166      ( 2022 07:13 )             40.1     07-09    140  |  99  |  21  ----------------------------<  80  5.0   |  34<H>  |  0.73    Ca    9.1      2022 07:11  Phos  3.7     07-09  Mg     1.8     07-09    TPro  5.9<L>  /  Alb  3.3  /  TBili  0.5  /  DBili  x   /  AST  29  /  ALT  17  /  AlkPhos  49  07-09    PT/INR - ( 2022 07:14 )   PT: 12.6 sec;   INR: 1.09 ratio               Urinalysis Basic - ( 2022 13:42 )    Color: Yellow / Appearance: Clear / S.022 / pH: x  Gluc: x / Ketone: Small  / Bili: Negative / Urobili: 2 mg/dL   Blood: x / Protein: 30 mg/dL / Nitrite: Negative   Leuk Esterase: Negative / RBC: 2 /hpf / WBC 2 /HPF   Sq Epi: x / Non Sq Epi: 5 /hpf / Bacteria: Negative        RADIOLOGY & ADDITIONAL TESTS:    Imaging Personally Reviewed:  Consultant(s) Notes Reviewed:    Care Discussed with Consultants/Other Providers:   [facility] Division of Hospital Medicine  Esther Waldron DO  8a-5pm: [phone]  after 5pm call [phone]    Patient is a 87y old  Male who presents with a chief complaint of COPD exacerbation (2022 07:12)        SUBJECTIVE / OVERNIGHT EVENTS: no acute complaints       MEDICATIONS  (STANDING):  ALBUTerol    90 MICROgram(s) HFA Inhaler 2 Puff(s) Inhalation every 6 hours  albuterol/ipratropium for Nebulization 3 milliLiter(s) Nebulizer every 6 hours  amLODIPine   Tablet 10 milliGRAM(s) Oral daily  aspirin  chewable 81 milliGRAM(s) Oral daily  atorvastatin 40 milliGRAM(s) Oral at bedtime  azithromycin  IVPB 500 milliGRAM(s) IV Intermittent every 24 hours  buDESOnide    Inhalation Suspension 0.5 milliGRAM(s) Inhalation every 12 hours  enoxaparin Injectable 40 milliGRAM(s) SubCutaneous every 24 hours  insulin lispro (ADMELOG) corrective regimen sliding scale   SubCutaneous every 6 hours    MEDICATIONS  (PRN):      Vital Signs Last 24 Hrs  T(C): 36.3 (2022 08:08), Max: 36.6 (2022 11:00)  T(F): 97.3 (2022 08:08), Max: 97.9 (2022 11:00)  HR: 74 (2022 08:08) (52 - 74)  BP: 119/60 (2022 08:08) (97/55 - 134/64)  BP(mean): 77 (2022 17:00) (73 - 92)  RR: 18 (2022 08:08) (16 - 34)  SpO2: 97% (2022 08:08) (88% - 100%)    Parameters below as of 2022 08:08  Patient On (Oxygen Delivery Method): nasal cannula  O2 Flow (L/min): 2    CAPILLARY BLOOD GLUCOSE      POCT Blood Glucose.: 81 mg/dL (2022 05:59)  POCT Blood Glucose.: 87 mg/dL (2022 01:19)  POCT Blood Glucose.: 76 mg/dL (2022 23:16)  POCT Blood Glucose.: 69 mg/dL (2022 23:12)  POCT Blood Glucose.: 60 mg/dL (2022 22:17)  POCT Blood Glucose.: 49 mg/dL (2022 22:16)  POCT Blood Glucose.: 62 mg/dL (2022 21:21)  POCT Blood Glucose.: 107 mg/dL (2022 13:58)    I&O's Summary    2022 07:01  -  2022 07:00  --------------------------------------------------------  IN: 500 mL / OUT: 1150 mL / NET: -650 mL        PHYSICAL EXAM:  GENERAL: NAD, breathing normal  HEAD:  Atraumatic, Normocephalic  EYES: conjunctiva and sclera clear  NECK: supple, No JVD  CHEST/LUNG: CTA b/l  HEART: S1 S2 RRR  ABDOMEN: +BS Soft, NT/ND  EXTREMITIES:  2+ DP Pulses, No c/c/e  NEUROLOGY: AAOx3, no facial droop, no focal deficits   SKIN: No rashes or lesions    LABS:                        13.2   10.64 )-----------( 166      ( 2022 07:13 )             40.1     07-09    140  |  99  |  21  ----------------------------<  80  5.0   |  34<H>  |  0.73    Ca    9.1      2022 07:11  Phos  3.7     07-09  Mg     1.8     07-09    TPro  5.9<L>  /  Alb  3.3  /  TBili  0.5  /  DBili  x   /  AST  29  /  ALT  17  /  AlkPhos  49  07-09    PT/INR - ( 2022 07:14 )   PT: 12.6 sec;   INR: 1.09 ratio               Urinalysis Basic - ( 2022 13:42 )    Color: Yellow / Appearance: Clear / S.022 / pH: x  Gluc: x / Ketone: Small  / Bili: Negative / Urobili: 2 mg/dL   Blood: x / Protein: 30 mg/dL / Nitrite: Negative   Leuk Esterase: Negative / RBC: 2 /hpf / WBC 2 /HPF   Sq Epi: x / Non Sq Epi: 5 /hpf / Bacteria: Negative        RADIOLOGY & ADDITIONAL TESTS:    Imaging Personally Reviewed: CXR film reviewed - clear lungs   EKg tracing reviewed - NSR@76bpm, LBBB (old)  Consultant(s) Notes Reviewed:    Care Discussed with Consultants/Other Providers:

## 2022-07-09 NOTE — DIETITIAN INITIAL EVALUATION ADULT - REASON FOR ADMISSION
Pt is an 86 yo M with PMH: COPD, HTN, HLD. Admitted with acute hypercarbic respiratory failure due to COPD exacerbation. S/P intubation; extubated 7/8. Continues on steroids as prescribed. Course c/b hypoglycemia.

## 2022-07-09 NOTE — DIETITIAN INITIAL EVALUATION ADULT - ORAL INTAKE PTA/DIET HISTORY
Pt reports fair appetite at baseline; states "I don't eat too much at one time" but "I eat throughout the day". Denies specific dietary preferences, denies food allergies. Denies intolerance to chewing/swallowing at baseline; noted with missing dentition in-house. Unclear if pt took vitamins PTA; requesting to receive oral nutrition supplements (prefers vanilla flavor).

## 2022-07-09 NOTE — DIETITIAN INITIAL EVALUATION ADULT - PERTINENT LABORATORY DATA
07-09    140  |  99  |  21  ----------------------------<  80  5.0   |  34<H>  |  0.73    Ca    9.1      09 Jul 2022 07:11  Phos  3.7     07-09  Mg     1.8     07-09    TPro  5.9<L>  /  Alb  3.3  /  TBili  0.5  /  DBili  x   /  AST  29  /  ALT  17  /  AlkPhos  49  07-09  POCT Blood Glucose.: 81 mg/dL (07-09-22 @ 05:59)  A1C with Estimated Average Glucose Result: 6.1 % (07-09-22 @ 07:13)

## 2022-07-09 NOTE — DIETITIAN INITIAL EVALUATION ADULT - OTHER INFO
Dosing wt (7/7): 88 lbs  Wt trend (per [facility]): (10/19/21): 110.7 lbs.  Overall wt loss of 22.7 lbs/20.5% x ~9 months, significant

## 2022-07-09 NOTE — DIETITIAN INITIAL EVALUATION ADULT - NSFNSADHERENCEPTAFT_GEN_A_CORE
Pt denies hx of DM; A1c 6.1%. Recommend diet liberalization (d/c consistent carbohydrate, DASH) in setting of low body wt/BMI.

## 2022-07-09 NOTE — DIETITIAN INITIAL EVALUATION ADULT - ETIOLOGY
predicted inadequate energy intake with subsequent increased nutrient needs in setting of catabolic illness (COPD)

## 2022-07-09 NOTE — PROGRESS NOTE ADULT - PROBLEM SELECTOR PLAN 1
overall improved s/p intubation and MICU monitoring   RVP/covid neg, sputum cx neg  continue steroids - change to prednisone 40mg qd to complete 5 day course  c/w azithro  c/w duonebs ATC Q6hr for now  c/w budesonide  on Incruse ellipta at home - continue on discharge   titrate down O2 as tolerated - currently on 2LNC (not on home O2) overall improved s/p intubation and MICU monitoring   RVP/covid neg, sputum cx neg, cxr clear   continue steroids - change to prednisone 40mg qd to complete 5 day course  c/w azithro  c/w duonebs ATC Q6hr for now  c/w budesonide  on Incruse ellipta at home - continue on discharge   titrate down O2 as tolerated - currently on 2LNC (not on home O2)

## 2022-07-09 NOTE — CONSULT NOTE ADULT - ASSESSMENT
88 yo M with h/o COPD, HTN, HLD, p/w acute hypercarbic respiratory failure due to COPD exacerbation. GI called to evaluate pt this evening (7/9) after pt had chicken for lunch and felt dysphagia afterwards. ENT was called for evaluation after sensation of chicken stuck in throat. ENT performed indirect laryngoscopy which was clear for any foreign body pt. Pt currently tolerating secretions and PO liquids w/o difficulty. Reports he has never had an EGD before.     Impression:  #Dysphagia: resolved; may have had transient aspiration event; per nurse pt's grandchildren report frequent coughing after meals; currently tolerating secretions w/o difficulty; Differential includes oropharyngeal phase (neurological, muscular weakness) vs. esophageal phase (ring vs. EoE vs. maglinancy)    Recommendations:  - clear liquids for now  - keep NPO at midnight  - recommend formal SLP eval including MBS given reported coughing after eating at home  - aspiration precautions  - can consider non-emergent EGD 7/11 if pt amenable (pt states he is currently undecided)    **THIS NOTE IS NOT FINALIZED UNTIL SIGNED BY THE ATTENDING**    Gaby Medrano MD  GI Fellow, PGY-5  Available via [employer] Teams    NON-URGENT CONSULTS:  Please email [email or web address]  [email or web address]  AT NIGHT AND ON WEEKENDS:  Contact on-call GI fellow via answering service ([phone]) from 5pm-8am and on weekends/holidays  MONDAY-FRIDAY 8AM-5PM:  Pager# [phone] ([facility]) or [phone] ([facility])  GI Phone# [phone] ([facility])     86 yo M with h/o COPD, HTN, HLD, p/w acute hypercarbic respiratory failure due to COPD exacerbation. GI called to evaluate pt this evening (7/9) after pt had chicken for lunch and felt dysphagia afterwards. ENT was called for evaluation after sensation of chicken stuck in throat. ENT performed indirect laryngoscopy which was clear for any foreign body pt. Pt currently tolerating secretions and PO liquids w/o difficulty. Reports he has never had an EGD before. s/p glucagon 1 mg x1 IV.    Impression:  #Dysphagia: resolved; may have had transient aspiration event; per nurse pt's grandchildren report frequent coughing after meals; currently tolerating secretions w/o difficulty; Differential includes oropharyngeal phase (neurological, muscular weakness) vs. esophageal phase (ring vs. EoE vs. maglinancy)    Recommendations:  - clear liquids for now  - keep NPO at midnight  - recommend formal SLP eval including MBS given reported coughing after eating at home  - aspiration precautions  - can consider non-emergent EGD 7/11 if pt amenable (pt states he is currently undecided)    **THIS NOTE IS NOT FINALIZED UNTIL SIGNED BY THE ATTENDING**    Gaby Medrano MD  GI Fellow, PGY-5  Available via [employer] Teams    NON-URGENT CONSULTS:  Please email [email or web address]  AT NIGHT AND ON WEEKENDS:  Contact on-call GI fellow via answering service ([phone]) from 5pm-8am and on weekends/holidays  MONDAY-FRIDAY 8AM-5PM:  Pager# [phone] ([facility]) or [phone] ([facility])  GI Phone# [phone] ([facility])

## 2022-07-09 NOTE — DIETITIAN INITIAL EVALUATION ADULT - ADD RECOMMEND
1. Recommend liberalize diet to NO therapeutic restrictions. Defer consistency to medical team, SLP. Consider SLP evaluation for formal swallow test.   2. Recommend Ensure Enlive 2x daily to aid in energy/nutrient optimization.   3. Recommend multivitamin (if no medication contraindications) to aid in prevention of micronutrient deficiencies.   4. Monitor and encourage PO intake; honor dietary preferences as expressed as able.

## 2022-07-09 NOTE — PHYSICAL THERAPY INITIAL EVALUATION ADULT - ADDITIONAL COMMENTS
Prior to admission pt reports being independent of all ADL's & functional mobility without AD. Pt resides in house with grandsons 3 steps to enter, 1 flight to bedroom. Pt owns RW and SAC.

## 2022-07-09 NOTE — CHART NOTE - NSCHARTNOTEFT_GEN_A_CORE
Notified by RN that patient's fingerstick reading was 62 earlier in the evening.  Patient in no acute distress and only c/o feeling cold.  Apple juice was given and fingerstick reading was 60.  12.5g Dextrose 50% IVP administered and repeat fingerstick minimally improved to 76.  Additional 12.5g Dextrose 50% IVP given and repeat fingerstick was 87.  A total of 25g of Dextrose 50% IVP administered.    CAPILLARY BLOOD GLUCOSE      POCT Blood Glucose.: 87 mg/dL (09 Jul 2022 01:19)  POCT Blood Glucose.: 76 mg/dL (08 Jul 2022 23:16)  POCT Blood Glucose.: 69 mg/dL (08 Jul 2022 23:12)  POCT Blood Glucose.: 60 mg/dL (08 Jul 2022 22:17)  POCT Blood Glucose.: 49 mg/dL (08 Jul 2022 22:16)  POCT Blood Glucose.: 62 mg/dL (08 Jul 2022 21:21)  POCT Blood Glucose.: 107 mg/dL (08 Jul 2022 13:58)  POCT Blood Glucose.: 141 mg/dL (08 Jul 2022 05:13)      Will continue to monitor patient's fingerstick readings.  A1c ordered for the AM.  Will endorse to the primary team in the AM.      Calista Trivedi PA-C  Department of Medicine  Spectra [phone]

## 2022-07-09 NOTE — PROVIDER CONTACT NOTE (OTHER) - REASON
Blood sugar only 76 after 5 cups apple juice/12.5 dextrose IV
pt with complaints of chicken piece stuck in the throat with lunch meal.

## 2022-07-09 NOTE — PROVIDER CONTACT NOTE (OTHER) - RECOMMENDATIONS
suction canister set up, airway checked for obstructing pieces. Regular diet changed to soft, bite sized

## 2022-07-09 NOTE — DIETITIAN INITIAL EVALUATION ADULT - ENERGY INTAKE
pt observed consuming lunch meal; states it is the first meal he has received since admission. Previously intubated on tube feeds 7/7-7/8, extubated 7/8 and PO diet initiated today 7/9./Poor (<50%)

## 2022-07-09 NOTE — PROVIDER CONTACT NOTE (OTHER) - ACTION/TREATMENT ORDERED:
Give another 12.5 IV dextrose and recheck around 1
ENT to follow up, s/s eval pending. CX-ray ordered

## 2022-07-09 NOTE — CONSULT NOTE ADULT - PROBLEM SELECTOR RECOMMENDATION 9
Pt discussed in detail with Dr. Fuller, plan to lateral chest x ray  - consider gi consult  - no further ent intervention

## 2022-07-09 NOTE — DIETITIAN INITIAL EVALUATION ADULT - REASON INDICATOR FOR ASSESSMENT
Nutrition Assessment warranted for length of stay/consult  Information obtained from: RN, comprehensive chart review, interdisciplinary medical rounds

## 2022-07-09 NOTE — CONSULT NOTE ADULT - SUBJECTIVE AND OBJECTIVE BOX
CC: chicken fb in throat     HPI:  87M PMH COPD (not on home O2), HTN, HLD, a/w acute hypercarbic respiratory failure due to COPD exacerbation. ENT called for evaluation after sensation of chicken stuck in throat. pt is having difficulty swallowing and breathing. Pt attempted to drink a little after without tolerating. Pt  odynophagia, dysphonia, sob changes in voice.       PAST MEDICAL & SURGICAL HISTORY:  HTN (hypertension)      COPD mixed type      No significant past surgical history        Allergies    No Known Allergies    Intolerances      MEDICATIONS  (STANDING):  ALBUTerol    90 MICROgram(s) HFA Inhaler 2 Puff(s) Inhalation every 6 hours  albuterol/ipratropium for Nebulization 3 milliLiter(s) Nebulizer every 6 hours  amLODIPine   Tablet 10 milliGRAM(s) Oral daily  aspirin  chewable 81 milliGRAM(s) Oral daily  atorvastatin 40 milliGRAM(s) Oral at bedtime  azithromycin  IVPB 500 milliGRAM(s) IV Intermittent every 24 hours  buDESOnide    Inhalation Suspension 0.5 milliGRAM(s) Inhalation every 12 hours  enoxaparin Injectable 40 milliGRAM(s) SubCutaneous every 24 hours  insulin lispro (ADMELOG) corrective regimen sliding scale   SubCutaneous every 6 hours    MEDICATIONS  (PRN):      Social History: no tobacco, no etoh     Family history: Pt denies any sign FHx    ROS:   ENT: all negative except as noted in HPI   CV: denies palpitations  Pulm: denies SOB, cough, hemoptysis  GI: denies change in apetite, indigestion, n/v  : denies pertinent urinary symptoms, urgency  Neuro: denies numbness/tingling, loss of sensation  Psych: denies anxiety  MS: denies muscle weakness, instability  Heme: denies easy bruising or bleeding  Endo: denies heat/cold intolerance, excessive sweating  Vascular: denies LE edema    Vital Signs Last 24 Hrs  T(C): 36.3 (09 Jul 2022 08:08), Max: 36.6 (08 Jul 2022 18:20)  T(F): 97.3 (09 Jul 2022 08:08), Max: 97.8 (08 Jul 2022 18:20)  HR: 74 (09 Jul 2022 08:08) (52 - 74)  BP: 119/60 (09 Jul 2022 08:08) (97/55 - 130/70)  BP(mean): 77 (08 Jul 2022 17:00) (73 - 77)  RR: 18 (09 Jul 2022 08:08) (16 - 18)  SpO2: 97% (09 Jul 2022 08:08) (97% - 100%)    Parameters below as of 09 Jul 2022 08:08  Patient On (Oxygen Delivery Method): nasal cannula  O2 Flow (L/min): 2                            13.2   10.64 )-----------( 166      ( 09 Jul 2022 07:13 )             40.1    07-09    140  |  99  |  21  ----------------------------<  80  5.0   |  34<H>  |  0.73    Ca    9.1      09 Jul 2022 07:11  Phos  3.7     07-09  Mg     1.8     07-09    TPro  5.9<L>  /  Alb  3.3  /  TBili  0.5  /  DBili  x   /  AST  29  /  ALT  17  /  AlkPhos  49  07-09   PT/INR - ( 09 Jul 2022 07:14 )   PT: 12.6 sec;   INR: 1.09 ratio             PHYSICAL EXAM:  Gen: NAD  Skin: No rashes, bruises, or lesions  Head: Normocephalic, Atraumatic  Face: no edema, erythema, or fluctuance. Parotid glands soft without mass  Eyes: no scleral injection  Nose: Nares bilaterally patent, no discharge  Mouth: No Stridor / Drooling / Trismus.  Mucosa moist, tongue/uvula midline, oropharynx clear  Neck: Flat, supple, no lymphadenopathy, trachea midline, no masses  Lymphatic: No lymphadenopathy  Resp: breathing easily, no stridor  CV: no peripheral edema/cyanosis  GI: nondistended   Peripheral vascular: no JVD or edema  Neuro: facial nerve intact, no facial droop          Fiberoptic Indirect laryngoscopy:  (Scope #2 used) Reason for Laryngoscopy:    Patient was unable to cooperate with mirror.  Nasopharynx, oropharynx, and hypopharynx clear, no bleeding. Tongue base, posterior pharyngeal wall, vallecula, epiglottis, and subglottis appear normal. No erythema, edema, pooling of secretions, masses or lesions. Airway patent, no foreign body visualized. No glottic/supraglottic edema. True vocal cords, arytenoids, vestibular folds, ventricles, pyriform sinuses, and aryepiglottic folds appear normal bilaterally. Vocal cords mobile with good contact b/l.              IMAGING/ADDITIONAL STUDIES: pending 
  Chief Complaint:  Patient is a 87y old  Male who presents with a chief complaint of COPD exacerbation (09 Jul 2022 18:21)      HPI: 88 yo M with h/o COPD H,TN, HLD, p/w acute hypercarbic respiratory failure due to COPD exacerbation. GI called to evaluate pt this evening after pt had chicken for lunch and felt dysphagia afterwards. ENT was called for evaluation after sensation of chicken stuck in throat. ENT performed indirect laryngoscopy which was clear for any foreign body pt. Pt currently tolerating secretions and PO liquids w/o difficulty. Reports he has never had an EGD before. Currently denies any dysphagia, odynophagia. Reported h/o weight loss (unable to quantify), no night sweats, melena/hematochezia, abd pain, N/V/D/C.        Allergies:  No Known Allergies      Home Medications:    Hospital Medications:  ALBUTerol    90 MICROgram(s) HFA Inhaler 2 Puff(s) Inhalation every 6 hours  albuterol/ipratropium for Nebulization 3 milliLiter(s) Nebulizer every 6 hours  amLODIPine   Tablet 10 milliGRAM(s) Oral daily  aspirin  chewable 81 milliGRAM(s) Oral daily  atorvastatin 40 milliGRAM(s) Oral at bedtime  buDESOnide    Inhalation Suspension 0.5 milliGRAM(s) Inhalation every 12 hours  dextrose 5%. 1000 milliLiter(s) IV Continuous <Continuous>  dextrose 50% Injectable 25 Gram(s) IV Push once  dextrose 50% Injectable 12.5 Gram(s) IV Push once  dextrose 50% Injectable 25 Gram(s) IV Push once  dextrose Oral Gel 15 Gram(s) Oral once  enoxaparin Injectable 40 milliGRAM(s) SubCutaneous every 24 hours  glucagon  Injectable 1 milliGRAM(s) IntraMuscular once  insulin lispro (ADMELOG) corrective regimen sliding scale   SubCutaneous every 6 hours  pantoprazole  Injectable 40 milliGRAM(s) IV Push daily      PMHX/PSHX:  HTN (hypertension)    COPD mixed type    No significant past surgical history        Family history:   Denies any family history of GI-related disease or cancers.    Social History:   ETOH: +h/o social ETOH  Tobacco: +former smoker; quit > 10 years ago; was smoking 1-2 ppd  Illicit drug use: denies    ROS: 14 point ROS negative unless otherwise stated in HPI      Vital Signs:  Vital Signs Last 24 Hrs  T(C): 36.4 (09 Jul 2022 16:00), Max: 36.4 (09 Jul 2022 16:00)  T(F): 97.6 (09 Jul 2022 16:00), Max: 97.6 (09 Jul 2022 16:00)  HR: 77 (09 Jul 2022 16:00) (52 - 77)  BP: 124/61 (09 Jul 2022 16:00) (119/60 - 142/70)  BP(mean): --  RR: 18 (09 Jul 2022 16:00) (18 - 18)  SpO2: 97% (09 Jul 2022 16:00) (97% - 100%)    Parameters below as of 09 Jul 2022 16:00  Patient On (Oxygen Delivery Method): nasal cannula  O2 Flow (L/min): 2    Daily     Daily     PHYSICAL EXAM:     GENERAL:  Appears stated age, well-groomed, well-nourished, no distress  HEENT:  NC/AT,  conjunctivae clear and pink; +NC  CHEST:  Full & symmetric excursion, no increased effort  HEART:  Regular rhythm, S1, S2, no murmur/rub/S3/S4  ABDOMEN:  +thin, soft, non-tender, non-distended  EXTREMITIES:  no cyanosis,clubbing or edema  SKIN:  No rash/erythema/ecchymoses/petechiae/wounds/abscess/warm/dry  NEURO:  Alert, oriented      LABS:                        13.2   10.64 )-----------( 166      ( 09 Jul 2022 07:13 )             40.1     07-09    140  |  99  |  21  ----------------------------<  80  5.0   |  34<H>  |  0.73    Ca    9.1      09 Jul 2022 07:11  Phos  3.7     07-09  Mg     1.8     07-09    TPro  5.9<L>  /  Alb  3.3  /  TBili  0.5  /  DBili  x   /  AST  29  /  ALT  17  /  AlkPhos  49  07-09    LIVER FUNCTIONS - ( 09 Jul 2022 07:11 )  Alb: 3.3 g/dL / Pro: 5.9 g/dL / ALK PHOS: 49 U/L / ALT: 17 U/L / AST: 29 U/L / GGT: x           PT/INR - ( 09 Jul 2022 07:14 )   PT: 12.6 sec;   INR: 1.09 ratio                 Imaging: No new abdominal imaging

## 2022-07-09 NOTE — PROVIDER CONTACT NOTE (OTHER) - BACKGROUND
pt admitted for COPD exacerbation
Admitted for COPD exacerbation. PMH: HTN and acute respiratory failure with hypoxia.

## 2022-07-10 ENCOUNTER — RESULT REVIEW (OUTPATIENT)
Age: 87
End: 2022-07-10

## 2022-07-10 DIAGNOSIS — R13.10 DYSPHAGIA, UNSPECIFIED: ICD-10-CM

## 2022-07-10 LAB
ANION GAP SERPL CALC-SCNC: 7 MMOL/L — SIGNIFICANT CHANGE UP (ref 5–17)
BUN SERPL-MCNC: 20 MG/DL — SIGNIFICANT CHANGE UP (ref 7–23)
CALCIUM SERPL-MCNC: 8.9 MG/DL — SIGNIFICANT CHANGE UP (ref 8.4–10.5)
CHLORIDE SERPL-SCNC: 95 MMOL/L — LOW (ref 96–108)
CO2 SERPL-SCNC: 35 MMOL/L — HIGH (ref 22–31)
CREAT SERPL-MCNC: 0.76 MG/DL — SIGNIFICANT CHANGE UP (ref 0.5–1.3)
CULTURE RESULTS: SIGNIFICANT CHANGE UP
EGFR: 88 ML/MIN/1.73M2 — SIGNIFICANT CHANGE UP
GLUCOSE BLDC GLUCOMTR-MCNC: 119 MG/DL — HIGH (ref 70–99)
GLUCOSE BLDC GLUCOMTR-MCNC: 145 MG/DL — HIGH (ref 70–99)
GLUCOSE BLDC GLUCOMTR-MCNC: 155 MG/DL — HIGH (ref 70–99)
GLUCOSE BLDC GLUCOMTR-MCNC: 71 MG/DL — SIGNIFICANT CHANGE UP (ref 70–99)
GLUCOSE BLDC GLUCOMTR-MCNC: 99 MG/DL — SIGNIFICANT CHANGE UP (ref 70–99)
GLUCOSE SERPL-MCNC: 86 MG/DL — SIGNIFICANT CHANGE UP (ref 70–99)
HCT VFR BLD CALC: 39.7 % — SIGNIFICANT CHANGE UP (ref 39–50)
HGB BLD-MCNC: 12.8 G/DL — LOW (ref 13–17)
MCHC RBC-ENTMCNC: 31.6 PG — SIGNIFICANT CHANGE UP (ref 27–34)
MCHC RBC-ENTMCNC: 32.2 GM/DL — SIGNIFICANT CHANGE UP (ref 32–36)
MCV RBC AUTO: 98 FL — SIGNIFICANT CHANGE UP (ref 80–100)
NRBC # BLD: 0 /100 WBCS — SIGNIFICANT CHANGE UP (ref 0–0)
PLATELET # BLD AUTO: 170 K/UL — SIGNIFICANT CHANGE UP (ref 150–400)
POTASSIUM SERPL-MCNC: 4.5 MMOL/L — SIGNIFICANT CHANGE UP (ref 3.5–5.3)
POTASSIUM SERPL-SCNC: 4.5 MMOL/L — SIGNIFICANT CHANGE UP (ref 3.5–5.3)
RBC # BLD: 4.05 M/UL — LOW (ref 4.2–5.8)
RBC # FLD: 13.6 % — SIGNIFICANT CHANGE UP (ref 10.3–14.5)
SODIUM SERPL-SCNC: 137 MMOL/L — SIGNIFICANT CHANGE UP (ref 135–145)
SPECIMEN SOURCE: SIGNIFICANT CHANGE UP
WBC # BLD: 9.15 K/UL — SIGNIFICANT CHANGE UP (ref 3.8–10.5)
WBC # FLD AUTO: 9.15 K/UL — SIGNIFICANT CHANGE UP (ref 3.8–10.5)

## 2022-07-10 PROCEDURE — 43239 EGD BIOPSY SINGLE/MULTIPLE: CPT

## 2022-07-10 PROCEDURE — 99233 SBSQ HOSP IP/OBS HIGH 50: CPT

## 2022-07-10 PROCEDURE — 99222 1ST HOSP IP/OBS MODERATE 55: CPT | Mod: GC,25

## 2022-07-10 RX ORDER — GLUCAGON INJECTION, SOLUTION 0.5 MG/.1ML
1 INJECTION, SOLUTION SUBCUTANEOUS ONCE
Refills: 0 | Status: DISCONTINUED | OUTPATIENT
Start: 2022-07-10 | End: 2022-07-15

## 2022-07-10 RX ORDER — GLUCAGON INJECTION, SOLUTION 0.5 MG/.1ML
1 INJECTION, SOLUTION SUBCUTANEOUS ONCE
Refills: 0 | Status: COMPLETED | OUTPATIENT
Start: 2022-07-10 | End: 2022-07-10

## 2022-07-10 RX ORDER — GUAIFENESIN/DEXTROMETHORPHAN 600MG-30MG
5 TABLET, EXTENDED RELEASE 12 HR ORAL EVERY 6 HOURS
Refills: 0 | Status: DISCONTINUED | OUTPATIENT
Start: 2022-07-10 | End: 2022-07-14

## 2022-07-10 RX ORDER — PANTOPRAZOLE SODIUM 20 MG/1
40 TABLET, DELAYED RELEASE ORAL
Refills: 0 | Status: DISCONTINUED | OUTPATIENT
Start: 2022-07-10 | End: 2022-07-15

## 2022-07-10 RX ORDER — FENTANYL CITRATE 50 UG/ML
25 INJECTION INTRAVENOUS
Refills: 0 | Status: DISCONTINUED | OUTPATIENT
Start: 2022-07-10 | End: 2022-07-10

## 2022-07-10 RX ORDER — ONDANSETRON 8 MG/1
4 TABLET, FILM COATED ORAL EVERY 6 HOURS
Refills: 0 | Status: DISCONTINUED | OUTPATIENT
Start: 2022-07-10 | End: 2022-07-10

## 2022-07-10 RX ORDER — ALBUTEROL 90 UG/1
2.5 AEROSOL, METERED ORAL ONCE
Refills: 0 | Status: COMPLETED | OUTPATIENT
Start: 2022-07-10 | End: 2022-07-10

## 2022-07-10 RX ORDER — DEXTROSE 50 % IN WATER 50 %
25 SYRINGE (ML) INTRAVENOUS ONCE
Refills: 0 | Status: COMPLETED | OUTPATIENT
Start: 2022-07-10 | End: 2022-07-10

## 2022-07-10 RX ADMIN — AMLODIPINE BESYLATE 10 MILLIGRAM(S): 2.5 TABLET ORAL at 05:27

## 2022-07-10 RX ADMIN — Medication 25 GRAM(S): at 07:50

## 2022-07-10 RX ADMIN — Medication 3 MILLILITER(S): at 13:06

## 2022-07-10 RX ADMIN — PANTOPRAZOLE SODIUM 40 MILLIGRAM(S): 20 TABLET, DELAYED RELEASE ORAL at 18:33

## 2022-07-10 RX ADMIN — ALBUTEROL 2.5 MILLIGRAM(S): 90 AEROSOL, METERED ORAL at 10:37

## 2022-07-10 RX ADMIN — Medication 3 MILLILITER(S): at 00:15

## 2022-07-10 RX ADMIN — Medication 3 MILLILITER(S): at 05:30

## 2022-07-10 RX ADMIN — SODIUM CHLORIDE 50 MILLILITER(S): 9 INJECTION, SOLUTION INTRAVENOUS at 07:50

## 2022-07-10 RX ADMIN — ATORVASTATIN CALCIUM 40 MILLIGRAM(S): 80 TABLET, FILM COATED ORAL at 21:46

## 2022-07-10 RX ADMIN — Medication 0.5 MILLIGRAM(S): at 05:30

## 2022-07-10 RX ADMIN — Medication 40 MILLIGRAM(S): at 05:27

## 2022-07-10 RX ADMIN — SODIUM CHLORIDE 50 MILLILITER(S): 9 INJECTION, SOLUTION INTRAVENOUS at 13:05

## 2022-07-10 RX ADMIN — Medication 3 MILLILITER(S): at 23:50

## 2022-07-10 RX ADMIN — Medication 30 MILLILITER(S): at 10:06

## 2022-07-10 RX ADMIN — PANTOPRAZOLE SODIUM 40 MILLIGRAM(S): 20 TABLET, DELAYED RELEASE ORAL at 10:15

## 2022-07-10 RX ADMIN — GLUCAGON INJECTION, SOLUTION 1 MILLIGRAM(S): 0.5 INJECTION, SOLUTION SUBCUTANEOUS at 14:10

## 2022-07-10 RX ADMIN — Medication 0.5 MILLIGRAM(S): at 18:33

## 2022-07-10 RX ADMIN — Medication 3 MILLILITER(S): at 18:33

## 2022-07-10 NOTE — PROGRESS NOTE ADULT - SUBJECTIVE AND OBJECTIVE BOX
Chief Complaint:  Patient is a 87y old  Male who presents with a chief complaint of COPD exacerbation (10 Jul 2022 09:50)      Interval Events: Reports dysphagia this AM after eating breakfast despite NPO recs. Not tolerating secretions. Plan for emergent EGD today.       Hospital Medications:  ALBUTerol    90 MICROgram(s) HFA Inhaler 2 Puff(s) Inhalation every 6 hours  albuterol/ipratropium for Nebulization 3 milliLiter(s) Nebulizer every 6 hours  amLODIPine   Tablet 10 milliGRAM(s) Oral daily  aspirin  chewable 81 milliGRAM(s) Oral daily  atorvastatin 40 milliGRAM(s) Oral at bedtime  buDESOnide    Inhalation Suspension 0.5 milliGRAM(s) Inhalation every 12 hours  dextrose 5%. 1000 milliLiter(s) IV Continuous <Continuous>  dextrose 50% Injectable 25 Gram(s) IV Push once  dextrose 50% Injectable 12.5 Gram(s) IV Push once  dextrose 50% Injectable 25 Gram(s) IV Push once  dextrose Oral Gel 15 Gram(s) Oral once  enoxaparin Injectable 40 milliGRAM(s) SubCutaneous every 24 hours  glucagon  Injectable 1 milliGRAM(s) IntraMuscular once  pantoprazole  Injectable 40 milliGRAM(s) IV Push two times a day  predniSONE   Tablet 40 milliGRAM(s) Oral daily      PMHX/PSHX:  HTN (hypertension)    COPD mixed type    No significant past surgical history            ROS: 14 point ROS negative unless otherwise stated in subjective      PHYSICAL EXAM:     GENERAL:  Well developed, no distress  HEENT:  NC/AT,  conjunctivae clear, sclera anicteric; +NC; spitting up secretions  CHEST:  Full & symmetric excursion, no increased effort w/ respirations  HEART:  Regular rhythm & rate  ABDOMEN:  Soft, non-tender, non-distended  EXTREMITIES:  no LE  edema  SKIN:  No rash/erythema/ecchymoses/petechiae/wounds/jaundice  NEURO:  Alert, oriented    Vital Signs:  Vital Signs Last 24 Hrs  T(C): 36.4 (10 Jul 2022 08:22), Max: 37.2 (10 Jul 2022 04:57)  T(F): 97.6 (10 Jul 2022 08:22), Max: 99 (10 Jul 2022 04:57)  HR: 81 (10 Jul 2022 08:22) (71 - 99)  BP: 125/70 (10 Jul 2022 08:22) (104/77 - 142/70)  BP(mean): --  RR: 18 (10 Jul 2022 08:22) (18 - 18)  SpO2: 96% (10 Jul 2022 08:22) (96% - 100%)    Parameters below as of 10 Jul 2022 08:22  Patient On (Oxygen Delivery Method): nasal cannula  O2 Flow (L/min): 1    Daily     Daily     LABS:                        12.8   9.15  )-----------( 170      ( 10 Jul 2022 06:45 )             39.7     07-10    137  |  95<L>  |  20  ----------------------------<  86  4.5   |  35<H>  |  0.76    Ca    8.9      10 Jul 2022 06:45  Phos  3.7     07-09  Mg     1.8     07-09    TPro  5.9<L>  /  Alb  3.3  /  TBili  0.5  /  DBili  x   /  AST  29  /  ALT  17  /  AlkPhos  49  07-09    LIVER FUNCTIONS - ( 09 Jul 2022 07:11 )  Alb: 3.3 g/dL / Pro: 5.9 g/dL / ALK PHOS: 49 U/L / ALT: 17 U/L / AST: 29 U/L / GGT: x           PT/INR - ( 09 Jul 2022 07:14 )   PT: 12.6 sec;   INR: 1.09 ratio                 Imaging: No new abdominal imaging

## 2022-07-10 NOTE — PROGRESS NOTE ADULT - ASSESSMENT
Impression:  COPD exacerbation requiring intubation, though the specific trigger for his exacerbation is not entirely clear.  A low grade infection, with changes in weather and possibly low grade aspiration may be playing a role, though other causes cannot be excluded.    Plan:    Continuing supplemental O2 to maintain saturations 92% or above watching for evidence of CO2 retention  Maintaining current nebulizer regimen  Prednsione 40 mg daily (as long as he can tolerate oral medication), with a cautious taper.  We will have a low threshold for additional antibiotic therapy.  Encouraging secretion clearance, pulmonary toilet prn.   Incentive spirometry.  Elevating HOB  Judicious CV/fluid management.  Guaifenesin dm cough syrup  Can add Mucinex needs (and if able to swallow adequately).  Will check LE dopplers.  Aspiration precautions.  GI and speech and swallowing input will be helpful.  Mobilization as is feasible.  Routine GI and DVT prophylaxis.    Discussed with the patient, the patient's grandchildren, and the [facility] staff in detail.     Thank you for this consult.  Will follow.    Impression:  COPD exacerbation requiring intubation, though the specific trigger for his exacerbation is not entirely clear.  A low grade infection, with changes in weather and possibly low grade aspiration may be playing a role, though other causes cannot be excluded.    Plan:    Continuing supplemental O2 to maintain saturations 92% or above watching for evidence of CO2 retention  Maintaining current nebulizer regimen  Prednsione 40 mg daily (as long as he can tolerate oral medication), with a cautious taper.  We will have a low threshold for additional antibiotic therapy.  Encouraging secretion clearance, pulmonary toilet prn.   Incentive spirometry.  Elevating HOB  Judicious CV/fluid management.  Guaifenesin dm cough syrup  Can add Mucinex needs (and if able to swallow adequately).  Will check LE dopplers.  Aspiration precautions.  GI and speech and swallowing input will be helpful.  Mobilization as is feasible.  Routine GI and DVT prophylaxis.        Thank you for this consult.  Will follow.    Impression:  COPD exacerbation requiring intubation, though the specific trigger for his exacerbation is not entirely clear.  A low grade infection, with changes in weather and possibly low grade aspiration may be playing a role, though other causes cannot be excluded.    Plan:    Continuing supplemental O2 to maintain saturations 92% or above watching for evidence of CO2 retention  Maintaining current nebulizer regimen  Prednsione 40 mg daily (as long as he can tolerate oral medication), with a cautious taper.  We will have a low threshold for additional antibiotic therapy.  Encouraging secretion clearance, pulmonary toilet prn.   Incentive spirometry.  Elevating HOB  Judicious CV/fluid management.  Guaifenesin dm cough syrup  Can add Mucinex needs (and if able to swallow adequately).  Will check LE dopplers.  Aspiration precautions.  GI and speech and swallowing follow up.  Mobilization as is feasible.  Routine GI and DVT prophylaxis.

## 2022-07-10 NOTE — PROGRESS NOTE ADULT - PROBLEM SELECTOR PLAN 1
overall improved s/p intubation and MICU monitoring   RVP/covid neg, sputum cx neg, cxr clear   continue steroids - change to prednisone 40mg qd to complete 5 day course  c/w azithro  c/w duonebs ATC Q6hr for now  c/w budesonide  on Incruse ellipta at home - continue on discharge   titrate down O2 as tolerated - currently on 2LNC (not on home O2)  -F/u pulm recs.

## 2022-07-10 NOTE — PROVIDER CONTACT NOTE (CHANGE IN STATUS NOTIFICATION) - BACKGROUND
pt admitted for COPD exacerbation. On 1L NC. stable vitals. spo2 at 98%. had similar incident yesterday during lunch time with chicken piece being stuck in the throat. pt was kept NPO overnight, in the morning pt requesting to be given food. diet advanced to soft and bite sized.

## 2022-07-10 NOTE — CHART NOTE - NSCHARTNOTEFT_GEN_A_CORE
s/p emergent EGD 7/10 for food impaction.    Impression:  - Food in the distal esophagus s/p endoscopic evacuation of food into stomach; this was likely the cause of pt's dysphagia.   - The examination was suspicious for achalasia.   - Esophagitis- biopsied to rule out eosinophilic esophagitis.   - Erythematous mucosa in the stomach.   - Normal duodenal bulb and second portion of the duodenum.     Recs:  - Clear liquid diet; please do not advance diet without letting GI know  - Await pathology results.   - Consider inpatient MBS + barium esophagram this admission.  - aspiration precautions  - would benefit from OP manometry  Follow up in Gastroenterology Clinic with Dr. Cassandra Gibson: [phone] (Faculty Practice at [address] [place] [address])     Full procedure note to follow.    Gaby Medrano MD  GI Fellow, PGY-5  Available via [employer] Teams    NON-URGENT CONSULTS:  Please email [email or web address]  AT NIGHT AND ON WEEKENDS:  Contact on-call GI fellow via answering service ([phone]) from 5pm-8am and on weekends/holidays  MONDAY-FRIDAY 8AM-5PM:  Pager# [phone] ([facility]) or [phone] ([facility])  GI Phone# [phone] ([facility])

## 2022-07-10 NOTE — CHART NOTE - NSCHARTNOTEFT_GEN_A_CORE
Pt experienced yesterday an episode of dysphagia and after eating dinner and food was stuck in throat. As per patient and grandchildren this episode occurred at home . Patient was seen by ENT and GI yesterday and GI recommended npo for S/S tests and possibly EGD.  This morning patient stated " I am 87 and I am going to eat, I am aware of food getting in my lungs; please give me water now ". Mr Delacruz is AOX4 and demonstrated full capacity to the writer to make his own medical decisions. Patient refused swallowing evaluation or any further workup . I will resume diet of mechanical soft diet chewable bite size meals upon patient wishes. Pt experienced yesterday an episode of dysphagia and after eating dinner and food was stuck in throat. As per patient and grandchildren this episode occurred at home . Patient was seen by ENT and GI yesterday and GI recommended npo for S/S tests and possibly EGD.  This morning patient stated " I am 87 and I am going to eat, I am aware of food getting in my lungs; please give me water now ". Mr Delacruz is AOX4 and demonstrated full capacity to the writer to make his own medical decisions. Patient refused swallowing evaluation or any further workup . I will resume diet of mechanical soft diet chewable bite size meals upon patient wishes.   I informed patient's granddaughter of the above and she agreed as well to respect her grandfather's wishes. However writer informed granddaughter to consider pursue further testing for dysphagia  outpatient with PMD

## 2022-07-10 NOTE — PROGRESS NOTE ADULT - SUBJECTIVE AND OBJECTIVE BOX
Follow-up Pulm Progress Note    The patient was seen and examined. Notes reviewed and discussed with staff/team as applicable      No new respiratory events overnight.      Denies: SOB, Chest pain, increased cough, colored phlegm, hemoptysis, N/V/D, neck stiffness, dysuria  ROS otherwise within normal limits    Vital Signs Last 24 Hrs  T(C): 36.4 (10 Jul 2022 08:22), Max: 37.2 (10 Jul 2022 04:57)  T(F): 97.6 (10 Jul 2022 08:22), Max: 99 (10 Jul 2022 04:57)  HR: 81 (10 Jul 2022 08:22) (71 - 99)  BP: 125/70 (10 Jul 2022 08:22) (104/77 - 142/70)  BP(mean): --  RR: 18 (10 Jul 2022 08:22) (18 - 18)  SpO2: 96% (10 Jul 2022 08:22) (96% - 100%)    Parameters below as of 10 Jul 2022 08:22  Patient On (Oxygen Delivery Method): nasal cannula  O2 Flow (L/min): 1            07-09 @ 07:01  -  07-10 @ 07:00  --------------------------------------------------------  IN: 500 mL / OUT: 700 mL / NET: -200 mL          Medications:  MEDICATIONS  (STANDING):  ALBUTerol    90 MICROgram(s) HFA Inhaler 2 Puff(s) Inhalation every 6 hours  albuterol/ipratropium for Nebulization 3 milliLiter(s) Nebulizer every 6 hours  amLODIPine   Tablet 10 milliGRAM(s) Oral daily  aspirin  chewable 81 milliGRAM(s) Oral daily  atorvastatin 40 milliGRAM(s) Oral at bedtime  buDESOnide    Inhalation Suspension 0.5 milliGRAM(s) Inhalation every 12 hours  dextrose 5%. 1000 milliLiter(s) (50 mL/Hr) IV Continuous <Continuous>  dextrose 50% Injectable 25 Gram(s) IV Push once  dextrose 50% Injectable 12.5 Gram(s) IV Push once  dextrose 50% Injectable 25 Gram(s) IV Push once  dextrose Oral Gel 15 Gram(s) Oral once  enoxaparin Injectable 40 milliGRAM(s) SubCutaneous every 24 hours  glucagon  Injectable 1 milliGRAM(s) IntraMuscular once  pantoprazole  Injectable 40 milliGRAM(s) IV Push daily  predniSONE   Tablet 40 milliGRAM(s) Oral daily    MEDICATIONS  (PRN):      Allergies    No Known Allergies    Intolerances        Vent settings (if applicable)        Physical Examination:    Pleasant  Neck: no JVD, LAD, accessory muscle use  PULM: Clear to auscultation bilaterally, no wheezes, rales, rhonchi  CVS: Regular rate and rhythm, S1S2, no murmurs, rubs, or gallops  Abdomen:  Extremities:  Neuro:      LABS:                        12.8   9.15  )-----------( 170      ( 10 Jul 2022 06:45 )             39.7     07-10    137  |  95<L>  |  20  ----------------------------<  86  4.5   |  35<H>  |  0.76    Ca    8.9      10 Jul 2022 06:45  Phos  3.7     07-09  Mg     1.8     07-09    TPro  5.9<L>  /  Alb  3.3  /  TBili  0.5  /  DBili  x   /  AST  29  /  ALT  17  /  AlkPhos  49  07-09          CAPILLARY BLOOD GLUCOSE      POCT Blood Glucose.: 145 mg/dL (10 Jul 2022 08:16)    PT/INR - ( 09 Jul 2022 07:14 )   PT: 12.6 sec;   INR: 1.09 ratio                   CULTURES:  Culture Results:   Normal Respiratory Maggi present (07-08 @ 07:05)  Culture Results:   No growth to date. (07-07 @ 13:27)  Culture Results:   No growth to date. (07-07 @ 13:23)    Most recent blood culture -- 07-08 @ 07:05   -- -- .Sputum Sputum 07-08 @ 07:05  Most recent blood culture -- 07-07 @ 13:27   -- -- .Blood Blood 07-07 @ 13:27  Most recent blood culture -- 07-07 @ 13:23   -- -- .Blood Blood 07-07 @ 13:23      RADIOLOGY REVIEWED    CXR:      CT chest:      Other:   Pulm Progress Note    Mr. Delacruz in an 87 year old male, and is welll known to our group, followed by Dr. Juan Manuel Modi in our office.  He was admitted on July 7 when he presented to the ER in very severe respiratory distress, not alleviated by multiple albuterol treatments.  He was found to be in acute respiratory failure, he was started  Bipap and AVASP, but remained extremely dyspnea and required intubation and neuromuscular relaxation. Of note, he was found to be Covid negative, a bedside echo reported showed no concerning abnormalities and his CxR showed no infiltrates or effusions.  He is an ex-smoker with a history of COPD, and has been maintained on Brovana and Yupelri via nebulizer along with an albuterol hfa inhaler as an outpatient.  His symptoms of dyspnea can rapidly worsen at times.  He has a history of multiple other medical problems including coronary artery disease.   He was last in our office in April of this year and he was in a wheelchair.    During the current admission, he was given with IV steroids, antibiotics, and nebulizer treatments.  He was extubated on June 8 and was transferred out of the ICU today.  Currently he reports that he is breathing more comfortably, though he gagged on some food earlier with a persistent sensation that the food was stuck in his throat.   ENT evaluation revealed no evidence of retained food in the posterior oropharynx and no other abnormalities of the upper airway were noted.  Speech and swallowing and gastroenterology evaluations were requested. He had some pedal edema over the past few weeks which reportedly has decreased.   The patient reports that he is feeling much better presently, though his dyspnea is not completely back to baseline.   He notes a cough, occasionally productive of off white/yellow sputum, no chest pain, he does report that his throat feels somewhat better than earlier today.      ROS:  as per HPI, otherwise, unremarkable/non-contributory.    Ex smoker-110 pk years last cig 1999  No recent Etoh    PMHx inc:  CAD (medically managed)  HTN  HLD  Prostate ca (rx with external radiation and seeds)  lower GIB  Inguinal hernias bilaterally    Surg hx inc  Bilateral inguinal hernia repair    Vital Signs Last 24 Hrs  T(C): 36.4 (10 Jul 2022 08:22), Max: 37.2 (10 Jul 2022 04:57)  T(F): 97.6 (10 Jul 2022 08:22), Max: 99 (10 Jul 2022 04:57)  HR: 81 (10 Jul 2022 08:22) (71 - 99)  BP: 125/70 (10 Jul 2022 08:22) (104/77 - 142/70)  BP(mean): --  RR: 18 (10 Jul 2022 08:22) (18 - 18)  SpO2: 96% (10 Jul 2022 08:22) (96% - 100%)    Parameters below as of 10 Jul 2022 08:22  Patient On (Oxygen Delivery Method): nasal cannula  O2 Flow (L/min): 1            07-09 @ 07:01  -  07-10 @ 07:00  --------------------------------------------------------  IN: 500 mL / OUT: 700 mL / NET: -200 mL          Medications:  MEDICATIONS  (STANDING):  ALBUTerol    90 MICROgram(s) HFA Inhaler 2 Puff(s) Inhalation every 6 hours  albuterol/ipratropium for Nebulization 3 milliLiter(s) Nebulizer every 6 hours  amLODIPine   Tablet 10 milliGRAM(s) Oral daily  aspirin  chewable 81 milliGRAM(s) Oral daily  atorvastatin 40 milliGRAM(s) Oral at bedtime  buDESOnide    Inhalation Suspension 0.5 milliGRAM(s) Inhalation every 12 hours  dextrose 5%. 1000 milliLiter(s) (50 mL/Hr) IV Continuous <Continuous>  dextrose 50% Injectable 25 Gram(s) IV Push once  dextrose 50% Injectable 12.5 Gram(s) IV Push once  dextrose 50% Injectable 25 Gram(s) IV Push once  dextrose Oral Gel 15 Gram(s) Oral once  enoxaparin Injectable 40 milliGRAM(s) SubCutaneous every 24 hours  glucagon  Injectable 1 milliGRAM(s) IntraMuscular once  pantoprazole  Injectable 40 milliGRAM(s) IV Push daily  predniSONE   Tablet 40 milliGRAM(s) Oral daily    MEDICATIONS  (PRN):      Allergies    No Known Allergies    Intolerances        Vent settings (if applicable)        Physical Examination:    Pleasant  Neck: prominent EJ, without accessory muscle use  PULM: prolonged expiratory phase, occ high pitched rhonchi, decreased breath sounds at the bases  CVS: Regular rate without obvious rub or gallop  Abdomen: soft, without marked distension or tenderness, normoactive bowel sounds  Extremities: arthritic changes without marked edema, cyanosis, tenderness or clubbing  Neuro: alert, appropriately responsive to simple questions, grossly non-focal      LABS:                        12.8   9.15  )-----------( 170      ( 10 Jul 2022 06:45 )             39.7     07-10    137  |  95<L>  |  20  ----------------------------<  86  4.5   |  35<H>  |  0.76    Ca    8.9      10 Jul 2022 06:45  Phos  3.7     07-09  Mg     1.8     07-09    TPro  5.9<L>  /  Alb  3.3  /  TBili  0.5  /  DBili  x   /  AST  29  /  ALT  17  /  AlkPhos  49  07-09          CAPILLARY BLOOD GLUCOSE      POCT Blood Glucose.: 145 mg/dL (10 Jul 2022 08:16)    PT/INR - ( 09 Jul 2022 07:14 )   PT: 12.6 sec;   INR: 1.09 ratio                   CULTURES:  Culture Results:   Normal Respiratory Maggi present (07-08 @ 07:05)  Culture Results:   No growth to date. (07-07 @ 13:27)  Culture Results:   No growth to date. (07-07 @ 13:23)    Most recent blood culture -- 07-08 @ 07:05   -- -- .Sputum Sputum 07-08 @ 07:05  Most recent blood culture -- 07-07 @ 13:27   -- -- .Blood Blood 07-07 @ 13:27  Most recent blood culture -- 07-07 @ 13:23   -- -- .Blood Blood 07-07 @ 13:23      RADIOLOGY REVIEWED    CXR:   PROCEDURE DATE:  07/07/2022          INTERPRETATION:  CLINICAL INFORMATION: evaluate ET tube and NG.    TECHNIQUE: Frontal radiograph of the chest from 7/7/2022 at 1:30 PM and   2:08 PM.    COMPARISON: 7/7/2022 at 11:00 AM.    FINDINGS:    7/7/2022 at 1:30 PM radiograph(s):    LINES/TUBES: Endotracheal tube terminates mid trachea. Enteric tube   terminates in the stomach.    LUNGS: The lungs are clear.    PLEURA: No pleural effusion or pneumothorax.    HEART AND MEDIASTINUM: Heart size is within normal limits.    SKELETON: Degenerative changes.    7/7/2022 at 2:08 PM radiograph(s):    No intervalchange.      IMPRESSION:    7/7/2022 at 1:30 PM radiograph: Endotracheal tube terminates mid trachea.   Enteric tube terminates in the stomach. Clear lungs.    7/7/2022 at 2:08 PM radiograph: No interval change.    --- End of Report ---          VEENA EDSAI MD; Resident Radiology  This document has been electronically signed.  GIA VAZQUEZ MD; Attending Radiologist  This document has been electronically signed. Jul 7 2022  9:34PM    ACC: [id number] EXAM:  XR CHEST PORTABLE URGENT 1V                          PROCEDURE DATE:  07/09/2022          INTERPRETATION:  TECHNIQUE: A single AP view of the chest was obtained.   Ordered time:   7/9/2022 6:08 PM    COMPARISON: 7/7/2022    CLINICAL INFORMATION: Food stuck in throat    FINDINGS:  Is been interval extubation and removal of the NG tube.  The heart is normal in size.  The lungs are hyperaerated, but clear.  There are no pleural effusions.  There is no pneumothorax.    IMPRESSION:    No acute pulmonary disease    --- End of Report ---            GIA VAZQUEZ MD; Attending Radiologist  This document has been electronically signed. Jul 10 2022 12:02PM      CT chest:      Other:   Pulm Progress Note    The patient's condition and treatment plan was review with the nursing and house staff as applicable.    No major events overnight.      ROS:  as per HPI, otherwise, unremarkable/non-contributory.      Vital Signs Last 24 Hrs  T(C): 36.4 (10 Jul 2022 08:22), Max: 37.2 (10 Jul 2022 04:57)  T(F): 97.6 (10 Jul 2022 08:22), Max: 99 (10 Jul 2022 04:57)  HR: 81 (10 Jul 2022 08:22) (71 - 99)  BP: 125/70 (10 Jul 2022 08:22) (104/77 - 142/70)  BP(mean): --  RR: 18 (10 Jul 2022 08:22) (18 - 18)  SpO2: 96% (10 Jul 2022 08:22) (96% - 100%)    Parameters below as of 10 Jul 2022 08:22  Patient On (Oxygen Delivery Method): nasal cannula  O2 Flow (L/min): 1            07-09 @ 07:01  -  07-10 @ 07:00  --------------------------------------------------------  IN: 500 mL / OUT: 700 mL / NET: -200 mL          Medications:  MEDICATIONS  (STANDING):  ALBUTerol    90 MICROgram(s) HFA Inhaler 2 Puff(s) Inhalation every 6 hours  albuterol/ipratropium for Nebulization 3 milliLiter(s) Nebulizer every 6 hours  amLODIPine   Tablet 10 milliGRAM(s) Oral daily  aspirin  chewable 81 milliGRAM(s) Oral daily  atorvastatin 40 milliGRAM(s) Oral at bedtime  buDESOnide    Inhalation Suspension 0.5 milliGRAM(s) Inhalation every 12 hours  dextrose 5%. 1000 milliLiter(s) (50 mL/Hr) IV Continuous <Continuous>  dextrose 50% Injectable 25 Gram(s) IV Push once  dextrose 50% Injectable 12.5 Gram(s) IV Push once  dextrose 50% Injectable 25 Gram(s) IV Push once  dextrose Oral Gel 15 Gram(s) Oral once  enoxaparin Injectable 40 milliGRAM(s) SubCutaneous every 24 hours  glucagon  Injectable 1 milliGRAM(s) IntraMuscular once  pantoprazole  Injectable 40 milliGRAM(s) IV Push daily  predniSONE   Tablet 40 milliGRAM(s) Oral daily    MEDICATIONS  (PRN):      Allergies    No Known Allergies    Intolerances        Vent settings (if applicable)        Physical Examination:    Pleasant  Neck: prominent EJ, without accessory muscle use  PULM: prolonged expiratory phase, occ high pitched rhonchi, decreased breath sounds at the bases  CVS: Regular rate without obvious rub or gallop  Abdomen: soft, without marked distension or tenderness, normoactive bowel sounds  Extremities: arthritic changes without marked edema, cyanosis, tenderness or clubbing  Neuro: alert, appropriately responsive to simple questions, grossly non-focal      LABS:                        12.8   9.15  )-----------( 170      ( 10 Jul 2022 06:45 )             39.7     07-10    137  |  95<L>  |  20  ----------------------------<  86  4.5   |  35<H>  |  0.76    Ca    8.9      10 Jul 2022 06:45  Phos  3.7     07-09  Mg     1.8     07-09    TPro  5.9<L>  /  Alb  3.3  /  TBili  0.5  /  DBili  x   /  AST  29  /  ALT  17  /  AlkPhos  49  07-09          CAPILLARY BLOOD GLUCOSE      POCT Blood Glucose.: 145 mg/dL (10 Jul 2022 08:16)    PT/INR - ( 09 Jul 2022 07:14 )   PT: 12.6 sec;   INR: 1.09 ratio                   CULTURES:  Culture Results:   Normal Respiratory Maggi present (07-08 @ 07:05)  Culture Results:   No growth to date. (07-07 @ 13:27)  Culture Results:   No growth to date. (07-07 @ 13:23)    Most recent blood culture -- 07-08 @ 07:05   -- -- .Sputum Sputum 07-08 @ 07:05  Most recent blood culture -- 07-07 @ 13:27   -- -- .Blood Blood 07-07 @ 13:27  Most recent blood culture -- 07-07 @ 13:23   -- -- .Blood Blood 07-07 @ 13:23      RADIOLOGY REVIEWED    CXR:   PROCEDURE DATE:  07/07/2022          INTERPRETATION:  CLINICAL INFORMATION: evaluate ET tube and NG.    TECHNIQUE: Frontal radiograph of the chest from 7/7/2022 at 1:30 PM and   2:08 PM.    COMPARISON: 7/7/2022 at 11:00 AM.    FINDINGS:    7/7/2022 at 1:30 PM radiograph(s):    LINES/TUBES: Endotracheal tube terminates mid trachea. Enteric tube   terminates in the stomach.    LUNGS: The lungs are clear.    PLEURA: No pleural effusion or pneumothorax.    HEART AND MEDIASTINUM: Heart size is within normal limits.    SKELETON: Degenerative changes.    7/7/2022 at 2:08 PM radiograph(s):    No intervalchange.      IMPRESSION:    7/7/2022 at 1:30 PM radiograph: Endotracheal tube terminates mid trachea.   Enteric tube terminates in the stomach. Clear lungs.    7/7/2022 at 2:08 PM radiograph: No interval change.    --- End of Report ---          VEENA DESAI MD; Resident Radiology  This document has been electronically signed.  GIA VAZQUEZ MD; Attending Radiologist  This document has been electronically signed. Jul 7 2022  9:34PM    ACC: [id number] EXAM:  XR CHEST PORTABLE URGENT 1V                          PROCEDURE DATE:  07/09/2022          INTERPRETATION:  TECHNIQUE: A single AP view of the chest was obtained.   Ordered time:   7/9/2022 6:08 PM    COMPARISON: 7/7/2022    CLINICAL INFORMATION: Food stuck in throat    FINDINGS:  Is been interval extubation and removal of the NG tube.  The heart is normal in size.  The lungs are hyperaerated, but clear.  There are no pleural effusions.  There is no pneumothorax.    IMPRESSION:    No acute pulmonary disease    --- End of Report ---            GIA VAZQUEZ MD; Attending Radiologist  This document has been electronically signed. Jul 10 2022 12:02PM      CT chest:      Other:   Pulm Progress Note    The patient's condition and treatment plan was review with the nursing and house staff as applicable.      Resting comfortably.  Without respiratory distress.      ROS: otherwise non-contributory      Vital Signs Last 24 Hrs  T(C): 36.4 (10 Jul 2022 08:22), Max: 37.2 (10 Jul 2022 04:57)  T(F): 97.6 (10 Jul 2022 08:22), Max: 99 (10 Jul 2022 04:57)  HR: 81 (10 Jul 2022 08:22) (71 - 99)  BP: 125/70 (10 Jul 2022 08:22) (104/77 - 142/70)  BP(mean): --  RR: 18 (10 Jul 2022 08:22) (18 - 18)  SpO2: 96% (10 Jul 2022 08:22) (96% - 100%)    Parameters below as of 10 Jul 2022 08:22  Patient On (Oxygen Delivery Method): nasal cannula  O2 Flow (L/min): 1            07-09 @ 07:01  -  07-10 @ 07:00  --------------------------------------------------------  IN: 500 mL / OUT: 700 mL / NET: -200 mL          Medications:  MEDICATIONS  (STANDING):  ALBUTerol    90 MICROgram(s) HFA Inhaler 2 Puff(s) Inhalation every 6 hours  albuterol/ipratropium for Nebulization 3 milliLiter(s) Nebulizer every 6 hours  amLODIPine   Tablet 10 milliGRAM(s) Oral daily  aspirin  chewable 81 milliGRAM(s) Oral daily  atorvastatin 40 milliGRAM(s) Oral at bedtime  buDESOnide    Inhalation Suspension 0.5 milliGRAM(s) Inhalation every 12 hours  dextrose 5%. 1000 milliLiter(s) (50 mL/Hr) IV Continuous <Continuous>  dextrose 50% Injectable 25 Gram(s) IV Push once  dextrose 50% Injectable 12.5 Gram(s) IV Push once  dextrose 50% Injectable 25 Gram(s) IV Push once  dextrose Oral Gel 15 Gram(s) Oral once  enoxaparin Injectable 40 milliGRAM(s) SubCutaneous every 24 hours  glucagon  Injectable 1 milliGRAM(s) IntraMuscular once  pantoprazole  Injectable 40 milliGRAM(s) IV Push daily  predniSONE   Tablet 40 milliGRAM(s) Oral daily    MEDICATIONS  (PRN):      Allergies    No Known Allergies    Intolerances        Vent settings (if applicable)        Physical Examination:    Pleasant  Neck: prominent EJ, without accessory muscle use  PULM: prolonged expiratory phase, occ high pitched rhonchi, decreased breath sounds at the bases  CVS: Regular rate without obvious rub or gallop  Abdomen: soft, without marked distension or tenderness, normoactive bowel sounds  Extremities: arthritic changes without marked edema, cyanosis, tenderness or clubbing  Neuro: alert, appropriately responsive to simple questions, grossly non-focal      LABS:                        12.8   9.15  )-----------( 170      ( 10 Jul 2022 06:45 )             39.7     07-10    137  |  95<L>  |  20  ----------------------------<  86  4.5   |  35<H>  |  0.76    Ca    8.9      10 Jul 2022 06:45  Phos  3.7     07-09  Mg     1.8     07-09    TPro  5.9<L>  /  Alb  3.3  /  TBili  0.5  /  DBili  x   /  AST  29  /  ALT  17  /  AlkPhos  49  07-09          CAPILLARY BLOOD GLUCOSE      POCT Blood Glucose.: 145 mg/dL (10 Jul 2022 08:16)    PT/INR - ( 09 Jul 2022 07:14 )   PT: 12.6 sec;   INR: 1.09 ratio                   CULTURES:  Culture Results:   Normal Respiratory Maggi present (07-08 @ 07:05)  Culture Results:   No growth to date. (07-07 @ 13:27)  Culture Results:   No growth to date. (07-07 @ 13:23)    Most recent blood culture -- 07-08 @ 07:05   -- -- .Sputum Sputum 07-08 @ 07:05  Most recent blood culture -- 07-07 @ 13:27   -- -- .Blood Blood 07-07 @ 13:27  Most recent blood culture -- 07-07 @ 13:23   -- -- .Blood Blood 07-07 @ 13:23      RADIOLOGY REVIEWED    CXR:   PROCEDURE DATE:  07/07/2022          INTERPRETATION:  CLINICAL INFORMATION: evaluate ET tube and NG.    TECHNIQUE: Frontal radiograph of the chest from 7/7/2022 at 1:30 PM and   2:08 PM.    COMPARISON: 7/7/2022 at 11:00 AM.    FINDINGS:    7/7/2022 at 1:30 PM radiograph(s):    LINES/TUBES: Endotracheal tube terminates mid trachea. Enteric tube   terminates in the stomach.    LUNGS: The lungs are clear.    PLEURA: No pleural effusion or pneumothorax.    HEART AND MEDIASTINUM: Heart size is within normal limits.    SKELETON: Degenerative changes.    7/7/2022 at 2:08 PM radiograph(s):    No intervalchange.      IMPRESSION:    7/7/2022 at 1:30 PM radiograph: Endotracheal tube terminates mid trachea.   Enteric tube terminates in the stomach. Clear lungs.    7/7/2022 at 2:08 PM radiograph: No interval change.    --- End of Report ---          VEENA DESAI MD; Resident Radiology  This document has been electronically signed.  GIA VAZQUEZ MD; Attending Radiologist  This document has been electronically signed. Jul 7 2022  9:34PM    ACC: [id number] EXAM:  XR CHEST PORTABLE URGENT 1V                          PROCEDURE DATE:  07/09/2022          INTERPRETATION:  TECHNIQUE: A single AP view of the chest was obtained.   Ordered time:   7/9/2022 6:08 PM    COMPARISON: 7/7/2022    CLINICAL INFORMATION: Food stuck in throat    FINDINGS:  Is been interval extubation and removal of the NG tube.  The heart is normal in size.  The lungs are hyperaerated, but clear.  There are no pleural effusions.  There is no pneumothorax.    IMPRESSION:    No acute pulmonary disease    --- End of Report ---            GIA VAZQUEZ MD; Attending Radiologist  This document has been electronically signed. Jul 10 2022 12:02PM      CT chest:      Other:

## 2022-07-10 NOTE — PROGRESS NOTE ADULT - SUBJECTIVE AND OBJECTIVE BOX
Patient is a 87y old  Male who presents with a chief complaint of COPD exacerbation (10 Jul 2022 16:01)        SUBJECTIVE / OVERNIGHT EVENTS: Patient feels like something stuck when trying to swallow.       MEDICATIONS  (STANDING):  ALBUTerol    90 MICROgram(s) HFA Inhaler 2 Puff(s) Inhalation every 6 hours  albuterol/ipratropium for Nebulization 3 milliLiter(s) Nebulizer every 6 hours  amLODIPine   Tablet 10 milliGRAM(s) Oral daily  aspirin  chewable 81 milliGRAM(s) Oral daily  atorvastatin 40 milliGRAM(s) Oral at bedtime  buDESOnide    Inhalation Suspension 0.5 milliGRAM(s) Inhalation every 12 hours  dextrose 5%. 1000 milliLiter(s) (50 mL/Hr) IV Continuous <Continuous>  dextrose 50% Injectable 25 Gram(s) IV Push once  dextrose 50% Injectable 12.5 Gram(s) IV Push once  dextrose 50% Injectable 25 Gram(s) IV Push once  dextrose Oral Gel 15 Gram(s) Oral once  enoxaparin Injectable 40 milliGRAM(s) SubCutaneous every 24 hours  glucagon  Injectable 1 milliGRAM(s) IntraMuscular once  pantoprazole  Injectable 40 milliGRAM(s) IV Push two times a day  predniSONE   Tablet 40 milliGRAM(s) Oral daily    MEDICATIONS  (PRN):  guaifenesin/dextromethorphan Oral Liquid 5 milliLiter(s) Oral every 6 hours PRN Persistent coughing      Vital Signs Last 24 Hrs  T(C): 36 (10 Jul 2022 16:08), Max: 37.2 (10 Jul 2022 04:57)  T(F): 96.8 (10 Jul 2022 16:08), Max: 99 (10 Jul 2022 04:57)  HR: 79 (10 Jul 2022 16:30) (76 - 99)  BP: 122/58 (10 Jul 2022 16:30) (104/77 - 131/71)  BP(mean): 84 (10 Jul 2022 16:30) (84 - 84)  RR: 16 (10 Jul 2022 16:30) (16 - 18)  SpO2: 95% (10 Jul 2022 16:30) (94% - 100%)    Parameters below as of 10 Jul 2022 16:08  Patient On (Oxygen Delivery Method): nasal cannula  O2 Flow (L/min): 2    CAPILLARY BLOOD GLUCOSE      POCT Blood Glucose.: 99 mg/dL (10 Jul 2022 11:45)  POCT Blood Glucose.: 145 mg/dL (10 Jul 2022 08:16)  POCT Blood Glucose.: 71 mg/dL (10 Jul 2022 06:35)  POCT Blood Glucose.: 101 mg/dL (09 Jul 2022 23:51)  POCT Blood Glucose.: 112 mg/dL (09 Jul 2022 20:50)  POCT Blood Glucose.: 151 mg/dL (09 Jul 2022 19:02)  POCT Blood Glucose.: 61 mg/dL (09 Jul 2022 18:13)  POCT Blood Glucose.: 58 mg/dL (09 Jul 2022 18:12)    I&O's Summary    09 Jul 2022 07:01  -  10 Jul 2022 07:00  --------------------------------------------------------  IN: 500 mL / OUT: 700 mL / NET: -200 mL    10 Jul 2022 07:01  -  10 Jul 2022 16:42  --------------------------------------------------------  IN: 100 mL / OUT: 200 mL / NET: -100 mL          PHYSICAL EXAM:   GENERAL: NAD,  thin     EYES: EOMI, PERRLA, conjunctiva and sclera clear  NECK: Supple, No JVD  CHEST/LUNG: distant bs  HEART: S1S2 normal. Regular rate and rhythm; No murmurs, rubs, or gallops  ABDOMEN: Soft, Nontender, Nondistended; Bowel sounds present  EXTREMITIES:  2+ Peripheral Pulses, No clubbing, cyanosis, or edema  PSYCH/Neuro: AAOx3. Non-focal.   SKIN: No rashes or lesions      LABS:                        12.8   9.15  )-----------( 170      ( 10 Jul 2022 06:45 )             39.7     07-10    137  |  95<L>  |  20  ----------------------------<  86  4.5   |  35<H>  |  0.76    Ca    8.9      10 Jul 2022 06:45  Phos  3.7     07-09  Mg     1.8     07-09    TPro  5.9<L>  /  Alb  3.3  /  TBili  0.5  /  DBili  x   /  AST  29  /  ALT  17  /  AlkPhos  49  07-09    PT/INR - ( 09 Jul 2022 07:14 )   PT: 12.6 sec;   INR: 1.09 ratio                     RADIOLOGY & ADDITIONAL TESTS:    Imaging Personally Reviewed:  Consultant(s) Notes Reviewed:  GI  Care Discussed with Consultants/Other Providers:

## 2022-07-10 NOTE — CHART NOTE - NSCHARTNOTEFT_GEN_A_CORE
RN reported patient vomited brownish emesis, arrived in room and found about a quarter of whitish mucous mixed with brownish emesis in basin. I reiterated to patient that he should forego with the egd and swallowing evaluation based on the current events of emesis. I will speak with GI about starting a protinix gtt as well . Patient agreed to PURSUE THE GI AND SWALLOWING EVAL . WILL REINSTATE ORDERS. PT NOW NPO. GENTLE IV HYDRATION , CBC STAT RN reported patient vomited brownish emesis, arrived in room and found about a quarter of whitish mucous mixed with brownish emesis in basin. I reiterated to patient that he should forego with the egd and swallowing evaluation based on the current events of emesis. I will speak with GI about starting a protinix gtt as well . Patient agreed to PURSUE THE GI AND SWALLOWING EVAL . WILL REINSTATE ORDERS. PT NOW NPO. GENTLE IV HYDRATION ,   Writer spoke with granddaughter and agreed to the workup

## 2022-07-11 DIAGNOSIS — Z29.9 ENCOUNTER FOR PROPHYLACTIC MEASURES, UNSPECIFIED: ICD-10-CM

## 2022-07-11 LAB
GLUCOSE BLDC GLUCOMTR-MCNC: 102 MG/DL — HIGH (ref 70–99)
GLUCOSE BLDC GLUCOMTR-MCNC: 108 MG/DL — HIGH (ref 70–99)
GLUCOSE BLDC GLUCOMTR-MCNC: 114 MG/DL — HIGH (ref 70–99)
GLUCOSE BLDC GLUCOMTR-MCNC: 120 MG/DL — HIGH (ref 70–99)
GLUCOSE BLDC GLUCOMTR-MCNC: 56 MG/DL — LOW (ref 70–99)
GLUCOSE BLDC GLUCOMTR-MCNC: 81 MG/DL — SIGNIFICANT CHANGE UP (ref 70–99)

## 2022-07-11 PROCEDURE — 93970 EXTREMITY STUDY: CPT | Mod: 26

## 2022-07-11 PROCEDURE — 99232 SBSQ HOSP IP/OBS MODERATE 35: CPT | Mod: GC

## 2022-07-11 PROCEDURE — 99233 SBSQ HOSP IP/OBS HIGH 50: CPT

## 2022-07-11 RX ORDER — DEXTROSE 50 % IN WATER 50 %
25 SYRINGE (ML) INTRAVENOUS ONCE
Refills: 0 | Status: COMPLETED | OUTPATIENT
Start: 2022-07-11 | End: 2022-07-11

## 2022-07-11 RX ADMIN — Medication 81 MILLIGRAM(S): at 11:32

## 2022-07-11 RX ADMIN — Medication 3 MILLILITER(S): at 06:47

## 2022-07-11 RX ADMIN — Medication 0.5 MILLIGRAM(S): at 18:47

## 2022-07-11 RX ADMIN — PANTOPRAZOLE SODIUM 40 MILLIGRAM(S): 20 TABLET, DELAYED RELEASE ORAL at 17:04

## 2022-07-11 RX ADMIN — PANTOPRAZOLE SODIUM 40 MILLIGRAM(S): 20 TABLET, DELAYED RELEASE ORAL at 06:47

## 2022-07-11 RX ADMIN — AMLODIPINE BESYLATE 10 MILLIGRAM(S): 2.5 TABLET ORAL at 06:46

## 2022-07-11 RX ADMIN — Medication 40 MILLIGRAM(S): at 06:46

## 2022-07-11 RX ADMIN — Medication 3 MILLILITER(S): at 11:32

## 2022-07-11 RX ADMIN — SODIUM CHLORIDE 50 MILLILITER(S): 9 INJECTION, SOLUTION INTRAVENOUS at 11:32

## 2022-07-11 RX ADMIN — ENOXAPARIN SODIUM 40 MILLIGRAM(S): 100 INJECTION SUBCUTANEOUS at 16:45

## 2022-07-11 RX ADMIN — ATORVASTATIN CALCIUM 40 MILLIGRAM(S): 80 TABLET, FILM COATED ORAL at 21:18

## 2022-07-11 RX ADMIN — Medication 25 GRAM(S): at 16:45

## 2022-07-11 RX ADMIN — Medication 0.5 MILLIGRAM(S): at 06:48

## 2022-07-11 RX ADMIN — Medication 3 MILLILITER(S): at 18:46

## 2022-07-11 NOTE — SWALLOW BEDSIDE ASSESSMENT ADULT - SWALLOW EVAL: DIAGNOSIS
Orders received for bedside swallow evaluation and modified barium swallow evaluation, chart reviewed. Case discussed with AFSHAN Mesa. This service awaiting clarification from GI to determine what textures can be administered on evaluation, as patient is currently on clears only. Will follow-up schedule permitting. 86 yo M with h/o COPD, HTN, HLD, p/w acute hypercarbic respiratory failure due to COPD exacerbation requiring intubation; s/p EGD for food impaction on 7/10. As per discussion with GI MD Medrano, patient assessed with clear liquids only for this assessment. Pt presents with an overtly functional oropharyngeal swallow for liquids at the bedside, with no overt signs of laryngeal penetration/aspiration; however, will proceed with MBS to rule out silent aspiration.

## 2022-07-11 NOTE — SWALLOW BEDSIDE ASSESSMENT ADULT - COMMENTS
7/9: GI called to evaluate pt this evening after pt had chicken for lunch and felt dysphagia afterwards. ENT was called for evaluation after sensation of chicken stuck in throat. ENT performed indirect laryngoscopy which was clear for any foreign body pt. Pt currently tolerating secretions and PO liquids w/o difficulty. Reports he has never had an EGD before. Differential includes oropharyngeal phase (neurological, muscular weakness) vs. esophageal phase (ring vs. EoE vs. maglinancy) GI recs: - clear liquids for now, keep NPO at midnight, recommend formal SLP eval including MBS given reported coughing after eating at home, aspiration precautions, can consider non-emergent EGD 7/11 if pt amenable  7/10: Underwent emergent EGD 7/10 showing - Food in the distal esophagus s/p endoscopic evacuation of food into stomach; this was likely the cause of pt's dysphagia; examination was suspicious for achalasia; esophagitis- biopsied to rule out eosinophilic esophagitis; erythematous mucosa in the stomach; normal duodenal bulb and second portion of the duodenum.  Recs:  - Clear liquid diet; please do not advance diet without letting GI know  - Await pathology results.   - Consider inpatient MBS + barium esophagram this admission.

## 2022-07-11 NOTE — PROGRESS NOTE ADULT - ASSESSMENT
Impression:  COPD exacerbation requiring intubation, triggered by aspiration?    Dysphagia, s/p EGD with food impaction. GI evaluation ongoing    Plan:    Continuing supplemental O2 to maintain saturations 92% or above watching for evidence of CO2 retention  Maintaining current nebulizer regimen  Prednisone 40 mg daily (as long as he can tolerate oral medication), with a cautious taper. Will decreased Tues  We will have a low threshold for additional antibiotic therapy.  Encouraging secretion clearance, pulmonary toilet prn.   Incentive spirometry.  Elevating HOB  Judicious CV/fluid management.  Guaifenesin dm cough syrup as needed  Will check LE dopplers.  Aspiration precautions per GI  GI and speech and swallowing follow up.  Mobilization as is feasible.  Routine GI and DVT prophylaxis.    Juan Manuel Modi MD  Pulmonary Medicine  [phone]

## 2022-07-11 NOTE — PROVIDER CONTACT NOTE (HYPOGLYCEMIA EVENT) - NS PROVIDER CONTACT BACKGROUND-HYPO
Age: 87y    Gender: Male    POCT Blood Glucose:  60 mg/dL (07-08-22 @ 22:17)  49 mg/dL (07-08-22 @ 22:16)  62 mg/dL (07-08-22 @ 21:21)  107 mg/dL (07-08-22 @ 13:58)  141 mg/dL (07-08-22 @ 05:13)      eMAR:atorvastatin   40 milliGRAM(s) Oral (07-08-22 @ 22:15)    methylPREDNISolone sodium succinate Injectable   40 milliGRAM(s) IV Push (07-08-22 @ 05:14)    
Age: 87y    Gender: Male    POCT Blood Glucose:  61 mg/dL (07-09-22 @ 18:13)  58 mg/dL (07-09-22 @ 18:12)  103 mg/dL (07-09-22 @ 12:11)  81 mg/dL (07-09-22 @ 05:59)  87 mg/dL (07-09-22 @ 01:19)  76 mg/dL (07-08-22 @ 23:16)  69 mg/dL (07-08-22 @ 23:12)  60 mg/dL (07-08-22 @ 22:17)      eMAR:atorvastatin   40 milliGRAM(s) Oral (07-08-22 @ 22:15)    dextrose 50% Injectable   12.5 Gram(s) IV Push (07-08-22 @ 22:27)    dextrose 50% Injectable   12.5 Gram(s) IV Push (07-09-22 @ 00:10)    dextrose 50% Injectable   12.5 Gram(s) IV Push (07-09-22 @ 18:35)    methylPREDNISolone sodium succinate Injectable   40 milliGRAM(s) IV Push (07-09-22 @ 06:20)    
Age: 87y    Gender: Male    POCT Blood Glucose:  60 mg/dL (07-08-22 @ 22:17)  49 mg/dL (07-08-22 @ 22:16)  62 mg/dL (07-08-22 @ 21:21)  107 mg/dL (07-08-22 @ 13:58)  141 mg/dL (07-08-22 @ 05:13)      eMAR:atorvastatin   40 milliGRAM(s) Oral (07-08-22 @ 22:15)    methylPREDNISolone sodium succinate Injectable   40 milliGRAM(s) IV Push (07-08-22 @ 05:14)    
Age: 87y    Gender: Male    POCT Blood Glucose:  56 mg/dL (07-11-22 @ 16:37)  114 mg/dL (07-11-22 @ 11:32)  81 mg/dL (07-11-22 @ 07:57)  120 mg/dL (07-11-22 @ 06:09)  155 mg/dL (07-10-22 @ 23:48)  119 mg/dL (07-10-22 @ 18:21)      eMAR:atorvastatin   40 milliGRAM(s) Oral (07-10-22 @ 21:46)    dextrose 50% Injectable   25 Gram(s) IV Push (07-11-22 @ 16:45)    predniSONE   Tablet   40 milliGRAM(s) Oral (07-11-22 @ 06:46)

## 2022-07-11 NOTE — PROGRESS NOTE ADULT - ASSESSMENT
86 yo M with h/o COPD, HTN, HLD, p/w acute hypercarbic respiratory failure due to COPD exacerbation. GI called to evaluate pt this evening () after pt had chicken for lunch and felt dysphagia afterwards. ENT was called for evaluation after sensation of chicken stuck in throat. ENT performed indirect laryngoscopy which was clear for any foreign body pt. On 7/10 after eating breakfast was not tolerating secretions. Underwent emergent EGD 7/10 showing - Food in the distal esophagus s/p endoscopic evacuation of food into stomach; this was likely the cause of pt's dysphagia; examination was suspicious for achalasia; esophagitis- biopsied to rule out eosinophilic esophagitis; erythematous mucosa in the stomach; normal duodenal bulb and second portion of the duodenum.     Impression:  #Dysphagia  #food impaction- s/p EGD with endoscopic passage of food from esophagus into stomach  #c/f underlying chronic aspiration-per nurse pt's grandchildren report frequent coughing after meal  Differential for chronic dysphagia/aspiration includes oropharyngeal phase (neurological, muscular weakness) with possible underlying achalasia. Underwent emergent EGD 7/10 showing - Food in the distal esophagus s/p endoscopic evacuation of food into stomach; this was likely the cause of pt's dysphagia; examination was suspicious for achalasia; esophagitis- biopsied to rule out eosinophilic esophagitis; erythematous mucosa in the stomach; normal duodenal bulb and second portion of the duodenum    Recommendations:  - Clear liquid diet.   - Await pathology results.   - Consider inpatient MBS + barium esophagram this admission.  - aspiration precautions  - would benefit from OP manometry  Follow up in Gastroenterology Clinic with Dr. Cassandra Gibson: [phone] (Faculty Practice at [address] [place] [address])       **THIS NOTE IS NOT FINALIZED UNTIL SIGNED BY THE ATTENDING**    Gaby Medrano MD  GI Fellow, PGY-5  Available via Microsoft Teams    NON-URGENT CONSULTS:  Please email [email or web address]  [email or web address]  AT NIGHT AND ON WEEKENDS:  Contact on-call GI fellow via answering service ([phone]) from 5pm-8am and on weekends/holidays  MONDAY-FRIDAY 8AM-5PM:  Pager# [phone] ([facility]) or [phone] ([facility])  GI Phone# [phone] ([facility])       88 yo M with h/o COPD, HTN, HLD, p/w acute hypercarbic respiratory failure due to COPD exacerbation. GI called to evaluate pt this evening () after pt had chicken for lunch and felt dysphagia afterwards. ENT was called for evaluation after sensation of chicken stuck in throat. ENT performed indirect laryngoscopy which was clear for any foreign body pt. On 7/10 after eating breakfast was not tolerating secretions. Underwent emergent EGD 7/10 showing - Food in the distal esophagus s/p endoscopic evacuation of food into stomach; this was likely the cause of pt's dysphagia; examination was suspicious for achalasia; esophagitis- biopsied to rule out eosinophilic esophagitis; erythematous mucosa in the stomach; normal duodenal bulb and second portion of the duodenum.     Impression:  #Dysphagia  #food impaction- s/p EGD with endoscopic passage of food from esophagus into stomach  #c/f underlying chronic aspiration-per nurse pt's grandchildren report frequent coughing after meal  Differential for chronic dysphagia/aspiration includes oropharyngeal phase (neurological, muscular weakness) with possible underlying achalasia. Underwent emergent EGD 7/10 showing - Food in the distal esophagus s/p endoscopic evacuation of food into stomach; this was likely the cause of pt's dysphagia; examination was suspicious for achalasia; esophagitis- biopsied to rule out eosinophilic esophagitis; erythematous mucosa in the stomach; normal duodenal bulb and second portion of the duodenum    Recommendations:  - Clear liquid diet; please do not advance diet without letting GI know  - Await pathology results.   - Consider inpatient MBS + barium esophagram this admission.  - aspiration precautions  - would benefit from OP manometry  Follow up in Gastroenterology Clinic with Dr. Cassandra Gibson: [phone] (Faculty Practice at [address] [place] [address])       **THIS NOTE IS NOT FINALIZED UNTIL SIGNED BY THE ATTENDING**    Gaby Medrano MD  GI Fellow, PGY-5  Available via [employer] Teams    NON-URGENT CONSULTS:  Please email [email or web address] [place]  [email or web address]  AT NIGHT AND ON WEEKENDS:  Contact on-call GI fellow via answering service ([phone]) from 5pm-8am and on weekends/holidays  MONDAY-FRIDAY 8AM-5PM:  Pager# [phone] ([facility]) or [phone] ([facility])  GI Phone# [phone] ([facility])       86 yo M with h/o COPD, HTN, HLD, p/w acute hypercarbic respiratory failure due to COPD exacerbation. GI called to evaluate pt this evening () after pt had chicken for lunch and felt dysphagia afterwards. ENT was called for evaluation after sensation of chicken stuck in throat. ENT performed indirect laryngoscopy which was clear for any foreign body pt. On 7/10 after eating breakfast was not tolerating secretions. Underwent emergent EGD 7/10 showing - Food in the distal esophagus s/p endoscopic evacuation of food into stomach; this was likely the cause of pt's dysphagia; examination was suspicious for achalasia; esophagitis- biopsied to rule out eosinophilic esophagitis; erythematous mucosa in the stomach; normal duodenal bulb and second portion of the duodenum.     Impression:  #Dysphagia  #food impaction- s/p EGD with endoscopic passage of food from esophagus into stomach  #c/f underlying chronic aspiration-per nurse pt's grandchildren report frequent coughing after meal  Differential for chronic dysphagia/aspiration includes oropharyngeal phase (neurological, muscular weakness) with possible underlying achalasia. Underwent emergent EGD 7/10 showing - Food in the distal esophagus s/p endoscopic evacuation of food into stomach; this was likely the cause of pt's dysphagia; examination was suspicious for achalasia; esophagitis- biopsied to rule out eosinophilic esophagitis; erythematous mucosa in the stomach; normal duodenal bulb and second portion of the duodenum    Recommendations:  - Clear liquid diet; please do not advance diet without letting GI know  - Await pathology results  - please order inpatient MBS + barium esophagram   - aspiration precautions  - would benefit from OP manometry  Follow up in Gastroenterology Clinic with Dr. Cassandra Gibson: [phone] (Faculty Practice at [address] [place] [address])       **THIS NOTE IS NOT FINALIZED UNTIL SIGNED BY THE ATTENDING**    aGby Medrano MD  GI Fellow, PGY-5  Available via [employer] Teams    NON-URGENT CONSULTS:  Please email [email or web address]  [email or web address]  AT NIGHT AND ON WEEKENDS:  Contact on-call GI fellow via answering service ([phone]) from 5pm-8am and on weekends/holidays  MONDAY-FRIDAY 8AM-5PM:  Pager# [phone] ([facility]) or [phone] ([facility])  GI Phone# [phone] ([facility])

## 2022-07-11 NOTE — PROVIDER CONTACT NOTE (HYPOGLYCEMIA EVENT) - NS PROVIDER CONTACT RECOMMEND-HYPO
Gave 2 apple juices and crackers. Recheck fingerstick in 30-60min
will recheck q15 mins until 100 
Recheck in 30 min- 60 min

## 2022-07-11 NOTE — PROGRESS NOTE ADULT - PROBLEM SELECTOR PLAN 1
overall improved s/p intubation and MICU monitoring   - RVP/covid neg, sputum cx neg, cxr clear   - continue steroids - on prednisone 40mg qd to complete 5 day course. taper as per pulmonary  - s/p azithro  - c/w duonebs ATC  - c/w budesonide  - on Incruse ellipta at home - continue on discharge   - titrate down O2 as tolerated - currently on 1LNC (not on home O2)  - Pulmonary following, recs appreciated

## 2022-07-11 NOTE — PROVIDER CONTACT NOTE (HYPOGLYCEMIA EVENT) - NS PROVIDER CONTACT NOTE-TREATMENT-HYPO
4 oz Fruit Juice (Specify quantity, date/time)
4 oz Fruit Juice (Specify quantity, date/time)/Dextrose 50% 25 Grams IV Push
Dextrose 50% 12.5 Grams IV Push
Dextrose 50% 12.5 Grams IV Push

## 2022-07-11 NOTE — PROGRESS NOTE ADULT - SUBJECTIVE AND OBJECTIVE BOX
Erinn Tomlinson MD  Division of Hospital Medicine  [facility]   Available on Microsoft Teams (Mon-Fri 8am-5pm)    * messages preferred prior to calls  Other Times:  [phone]      Patient is a 87y old  Male who presents with a chief complaint of COPD exacerbation (11 Jul 2022 07:49)      SUBJECTIVE / OVERNIGHT EVENTS: no acute events overnight. no fever, chills chest pain nor dypsnea. tolerating clears with no issues though does feel need to spit up phlegm periodically.  ADDITIONAL REVIEW OF SYSTEMS:    MEDICATIONS  (STANDING):  ALBUTerol    90 MICROgram(s) HFA Inhaler 2 Puff(s) Inhalation every 6 hours  albuterol/ipratropium for Nebulization 3 milliLiter(s) Nebulizer every 6 hours  amLODIPine   Tablet 10 milliGRAM(s) Oral daily  aspirin  chewable 81 milliGRAM(s) Oral daily  atorvastatin 40 milliGRAM(s) Oral at bedtime  buDESOnide    Inhalation Suspension 0.5 milliGRAM(s) Inhalation every 12 hours  dextrose 5%. 1000 milliLiter(s) (50 mL/Hr) IV Continuous <Continuous>  dextrose 50% Injectable 25 Gram(s) IV Push once  dextrose 50% Injectable 12.5 Gram(s) IV Push once  dextrose 50% Injectable 25 Gram(s) IV Push once  dextrose Oral Gel 15 Gram(s) Oral once  enoxaparin Injectable 40 milliGRAM(s) SubCutaneous every 24 hours  glucagon  Injectable 1 milliGRAM(s) IntraMuscular once  pantoprazole  Injectable 40 milliGRAM(s) IV Push two times a day  predniSONE   Tablet 40 milliGRAM(s) Oral daily    MEDICATIONS  (PRN):  guaifenesin/dextromethorphan Oral Liquid 5 milliLiter(s) Oral every 6 hours PRN Persistent coughing      CAPILLARY BLOOD GLUCOSE      POCT Blood Glucose.: 114 mg/dL (11 Jul 2022 11:32)  POCT Blood Glucose.: 81 mg/dL (11 Jul 2022 07:57)  POCT Blood Glucose.: 120 mg/dL (11 Jul 2022 06:09)  POCT Blood Glucose.: 155 mg/dL (10 Jul 2022 23:48)  POCT Blood Glucose.: 119 mg/dL (10 Jul 2022 18:21)    I&O's Summary    10 Jul 2022 07:01  -  11 Jul 2022 07:00  --------------------------------------------------------  IN: 150 mL / OUT: 450 mL / NET: -300 mL    11 Jul 2022 07:01  -  11 Jul 2022 14:28  --------------------------------------------------------  IN: 320 mL / OUT: 550 mL / NET: -230 mL        PHYSICAL EXAM:  Vital Signs Last 24 Hrs  T(C): 36.3 (11 Jul 2022 09:00), Max: 36.7 (10 Jul 2022 18:37)  T(F): 97.3 (11 Jul 2022 09:00), Max: 98.1 (10 Jul 2022 18:37)  HR: 71 (11 Jul 2022 09:00) (70 - 91)  BP: 124/60 (11 Jul 2022 09:00) (108/56 - 144/71)  BP(mean): 87 (10 Jul 2022 18:00) (76 - 87)  RR: 18 (11 Jul 2022 09:00) (16 - 18)  SpO2: 93% (11 Jul 2022 09:00) (91% - 98%)    Parameters below as of 11 Jul 2022 09:00  Patient On (Oxygen Delivery Method): room air        CONSTITUTIONAL: NAD, well-developed, well-groomed  EYES: PERRLA; conjunctiva and sclera clear  ENMT: Moist oral mucosa, no pharyngeal injection or exudates; normal dentition  NECK: Supple, no palpable masses; no thyromegaly  RESPIRATORY: Normal respiratory effort; lungs are clear to auscultation bilaterally, no wheeze nor crackles  CARDIOVASCULAR: Regular rate and rhythm, normal S1 and S2, no murmur/rub/gallop; No lower extremity edema; Peripheral pulses are 2+ bilaterally  ABDOMEN: Soft, Nondistended, Nontender to palpation, normoactive bowel sounds  MUSCULOSKELETAL:  No clubbing or cyanosis of digits; no joint swelling or tenderness to palpation  PSYCH: A+O to person, place, and time; affect appropriate  NEUROLOGY: CN 2-12 are intact and symmetric; no gross sensory deficits   SKIN: No rashes; no palpable lesions    LABS:                        12.8   9.15  )-----------( 170      ( 10 Jul 2022 06:45 )             39.7     07-10    137  |  95<L>  |  20  ----------------------------<  86  4.5   |  35<H>  |  0.76    Ca    8.9      10 Jul 2022 06:45          RADIOLOGY & ADDITIONAL TESTS:  Results Reviewed:   Imaging Personally Reviewed:  Electrocardiogram Personally Reviewed:    COORDINATION OF CARE:  Care Discussed with Consultants/Other Providers [Y]: medicine AFSHAN Mesa  Prior or Outpatient Records Reviewed [Y/N]:

## 2022-07-11 NOTE — PROVIDER CONTACT NOTE (HYPOGLYCEMIA EVENT) - NS PROVIDER CONTACT CONTRIBUTING FACTORS OF EPISODE
Poor oral intake within the last 24 hours

## 2022-07-11 NOTE — PROGRESS NOTE ADULT - ASSESSMENT
88 yo male with PMH COPD (not on home O2), HTN, and HLD who presents with acute hypercarbic respiratory failure 2/2 COPD exacerbation with course c/b dysphagia and food impaction.

## 2022-07-11 NOTE — PROGRESS NOTE ADULT - SUBJECTIVE AND OBJECTIVE BOX
Follow-up Pulm Progress Note    The patient was seen and examined. Notes reviewed and discussed with staff/team as applicable. GI evaluation noted.     No new respiratory events overnight. Feels ok now.      Denies: SOB, Chest pain, increased cough, colored phlegm, hemoptysis, N/V/D, neck stiffness, dysuria      Vital Signs Last 24 Hrs  T(C): 36.1 (11 Jul 2022 06:08), Max: 36.7 (10 Jul 2022 18:37)  T(F): 97 (11 Jul 2022 06:08), Max: 98.1 (10 Jul 2022 18:37)  HR: 70 (11 Jul 2022 06:08) (70 - 91)  BP: 120/65 (11 Jul 2022 06:08) (108/56 - 144/71)  BP(mean): 87 (10 Jul 2022 18:00) (76 - 87)  RR: 18 (11 Jul 2022 06:08) (16 - 18)  SpO2: 98% (11 Jul 2022 06:08) (91% - 98%)    Parameters below as of 11 Jul 2022 06:08  Patient On (Oxygen Delivery Method): room air              07-10 @ 07:01  -  07-11 @ 07:00  --------------------------------------------------------  IN: 150 mL / OUT: 450 mL / NET: -300 mL          Medications:  MEDICATIONS  (STANDING):  ALBUTerol    90 MICROgram(s) HFA Inhaler 2 Puff(s) Inhalation every 6 hours  albuterol/ipratropium for Nebulization 3 milliLiter(s) Nebulizer every 6 hours  amLODIPine   Tablet 10 milliGRAM(s) Oral daily  aspirin  chewable 81 milliGRAM(s) Oral daily  atorvastatin 40 milliGRAM(s) Oral at bedtime  buDESOnide    Inhalation Suspension 0.5 milliGRAM(s) Inhalation every 12 hours  dextrose 5%. 1000 milliLiter(s) (50 mL/Hr) IV Continuous <Continuous>  dextrose 50% Injectable 25 Gram(s) IV Push once  dextrose 50% Injectable 12.5 Gram(s) IV Push once  dextrose 50% Injectable 25 Gram(s) IV Push once  dextrose Oral Gel 15 Gram(s) Oral once  enoxaparin Injectable 40 milliGRAM(s) SubCutaneous every 24 hours  glucagon  Injectable 1 milliGRAM(s) IntraMuscular once  pantoprazole  Injectable 40 milliGRAM(s) IV Push two times a day  predniSONE   Tablet 40 milliGRAM(s) Oral daily    MEDICATIONS  (PRN):  guaifenesin/dextromethorphan Oral Liquid 5 milliLiter(s) Oral every 6 hours PRN Persistent coughing      Allergies    No Known Allergies            Physical Examination:    Pleasant thin white man NAD on O2  Neck: no JVD, LAD, accessory muscle use  PULM: Clear to auscultation bilaterally, no wheezes, rales, rhonchi  CVS: Regular rate and rhythm, S1S2, no murmurs, rubs, or gallops  Abdomen: soft, NT  Extremities: no edema  Neuro: non focal      LABS:                        12.8   9.15  )-----------( 170      ( 10 Jul 2022 06:45 )             39.7     07-10    137  |  95<L>  |  20  ----------------------------<  86  4.5   |  35<H>  |  0.76    Ca    8.9      10 Jul 2022 06:45            CAPILLARY BLOOD GLUCOSE      POCT Blood Glucose.: 120 mg/dL (11 Jul 2022 06:09)              CULTURES:  Culture Results:   Normal Respiratory Maggi present (07-08 @ 07:05)  Culture Results:   No growth to date. (07-07 @ 13:27)  Culture Results:   No growth to date. (07-07 @ 13:23)    Most recent blood culture -- 07-08 @ 07:05   -- -- .Sputum Sputum 07-08 @ 07:05  Most recent blood culture -- 07-07 @ 13:27   -- -- .Blood Blood 07-07 @ 13:27  Most recent blood culture -- 07-07 @ 13:23   -- -- .Blood Blood 07-07 @ 13:23

## 2022-07-11 NOTE — SWALLOW BEDSIDE ASSESSMENT ADULT - SLP PERTINENT HISTORY OF CURRENT PROBLEM
87M PMH COPD (not on home O2), HTN, HLD, presented to the ED due to COPD exacerbation. Overnight pt showed increased work of breathing, and despite using albuterol symptoms cannot be controlled. He was brought in by his grandson. Despite putting pt on BiPAP and AVASP pt was still having increased work of breathing and was therefore intubated after being paralyzed. VBG showed worsening acidosis. CXR clear. Limited ECHO benign. COVID and RVP negative. Pt was intubated on 7/7 for acute respiratory failure caused by COPD exacerbation. In the MICU, pt was started on methyprednisone 40 mg, pulmicort 0.5 mg, duonebs and azithromycin 500 mg. Pt's ABG and mentation improved and pt successfully tolerated extubation on 7/8 and transferred to the floors.

## 2022-07-11 NOTE — SWALLOW BEDSIDE ASSESSMENT ADULT - SLP GENERAL OBSERVATIONS
Pt encountered in bed, awake, on 2L NC. Grossly A&Ox3. Able to follow commands for assessment purposes. +Occasional baseline cough.

## 2022-07-11 NOTE — SWALLOW BEDSIDE ASSESSMENT ADULT - SWALLOW EVAL: PATIENT/FAMILY GOALS STATEMENT
Pt wants to return home; patient reports coughing/ belching during meals, states this has been going on "for years" but got acutely worse the past two days, where he felt that the belching no longer brought him "relief" as it typically does. Reports feeling better s/p EGD. Amenable to MBS.

## 2022-07-11 NOTE — PROGRESS NOTE ADULT - SUBJECTIVE AND OBJECTIVE BOX
Chief Complaint:  Patient is a 87y old  Male who presents with a chief complaint of COPD exacerbation (10 Jul 2022 16:01)      Interval Events: Reports feeling well. Denies any N/V/D/C, abd pain, melena or hematochezia.  Underwent emergent EGD 7/10 showing - Food in the distal esophagus s/p endoscopic evacuation of food into stomach; this was likely the cause of pt's dysphagia; examination was suspicious for achalasia; esophagitis- biopsied to rule out eosinophilic esophagitis; erythematous mucosa in the stomach; normal duodenal bulb and second portion of the duodenum.    Hospital Medications:  ALBUTerol    90 MICROgram(s) HFA Inhaler 2 Puff(s) Inhalation every 6 hours  albuterol/ipratropium for Nebulization 3 milliLiter(s) Nebulizer every 6 hours  amLODIPine   Tablet 10 milliGRAM(s) Oral daily  aspirin  chewable 81 milliGRAM(s) Oral daily  atorvastatin 40 milliGRAM(s) Oral at bedtime  buDESOnide    Inhalation Suspension 0.5 milliGRAM(s) Inhalation every 12 hours  dextrose 5%. 1000 milliLiter(s) IV Continuous <Continuous>  dextrose 50% Injectable 25 Gram(s) IV Push once  dextrose 50% Injectable 12.5 Gram(s) IV Push once  dextrose 50% Injectable 25 Gram(s) IV Push once  dextrose Oral Gel 15 Gram(s) Oral once  enoxaparin Injectable 40 milliGRAM(s) SubCutaneous every 24 hours  glucagon  Injectable 1 milliGRAM(s) IntraMuscular once  guaifenesin/dextromethorphan Oral Liquid 5 milliLiter(s) Oral every 6 hours PRN  pantoprazole  Injectable 40 milliGRAM(s) IV Push two times a day  predniSONE   Tablet 40 milliGRAM(s) Oral daily      PMHX/PSHX:  HTN (hypertension)    COPD mixed type    No significant past surgical history            ROS: 14 point ROS negative unless otherwise stated in subjective      PHYSICAL EXAM:     GENERAL:  Well developed, no distress  HEENT:  NC/AT,  conjunctivae clear, sclera anicteric  CHEST:  Full & symmetric excursion, no increased effort w/ respirations  HEART:  Regular rhythm & rate  ABDOMEN:  Soft, non-tender, non-distended  EXTREMITIES:  no LE  edema  SKIN:  No rash/erythema/ecchymoses/petechiae/wounds/jaundice  NEURO:  Alert, oriented    Vital Signs:  Vital Signs Last 24 Hrs  T(C): 36.1 (11 Jul 2022 06:08), Max: 36.7 (10 Jul 2022 18:37)  T(F): 97 (11 Jul 2022 06:08), Max: 98.1 (10 Jul 2022 18:37)  HR: 70 (11 Jul 2022 06:08) (70 - 91)  BP: 120/65 (11 Jul 2022 06:08) (108/56 - 144/71)  BP(mean): 87 (10 Jul 2022 18:00) (76 - 87)  RR: 18 (11 Jul 2022 06:08) (16 - 18)  SpO2: 98% (11 Jul 2022 06:08) (91% - 98%)    Parameters below as of 11 Jul 2022 06:08  Patient On (Oxygen Delivery Method): room air      Daily Height in cm: 175.26 (10 Jul 2022 15:09)    Daily     LABS:                        12.8   9.15  )-----------( 170      ( 10 Jul 2022 06:45 )             39.7     07-10    137  |  95<L>  |  20  ----------------------------<  86  4.5   |  35<H>  |  0.76    Ca    8.9      10 Jul 2022 06:45  Phos  3.7     07-09  Mg     1.8     07-09    TPro  5.9<L>  /  Alb  3.3  /  TBili  0.5  /  DBili  x   /  AST  29  /  ALT  17  /  AlkPhos  49  07-09    LIVER FUNCTIONS - ( 09 Jul 2022 07:11 )  Alb: 3.3 g/dL / Pro: 5.9 g/dL / ALK PHOS: 49 U/L / ALT: 17 U/L / AST: 29 U/L / GGT: x           PT/INR - ( 09 Jul 2022 07:14 )   PT: 12.6 sec;   INR: 1.09 ratio                 Imaging: No new abdominal imaging               Chief Complaint:  Patient is a 87y old  Male who presents with a chief complaint of COPD exacerbation (10 Jul 2022 16:01)      Interval Events: Reports feeling well. Denies any dysphagia, N/V/D/C, abd pain, melena or hematochezia.  Underwent emergent EGD 7/10 showing - Food in the distal esophagus s/p endoscopic evacuation of food into stomach; this was likely the cause of pt's dysphagia; examination was suspicious for achalasia; esophagitis- biopsied to rule out eosinophilic esophagitis; erythematous mucosa in the stomach; normal duodenal bulb and second portion of the duodenum.    Hospital Medications:  ALBUTerol    90 MICROgram(s) HFA Inhaler 2 Puff(s) Inhalation every 6 hours  albuterol/ipratropium for Nebulization 3 milliLiter(s) Nebulizer every 6 hours  amLODIPine   Tablet 10 milliGRAM(s) Oral daily  aspirin  chewable 81 milliGRAM(s) Oral daily  atorvastatin 40 milliGRAM(s) Oral at bedtime  buDESOnide    Inhalation Suspension 0.5 milliGRAM(s) Inhalation every 12 hours  dextrose 5%. 1000 milliLiter(s) IV Continuous <Continuous>  dextrose 50% Injectable 25 Gram(s) IV Push once  dextrose 50% Injectable 12.5 Gram(s) IV Push once  dextrose 50% Injectable 25 Gram(s) IV Push once  dextrose Oral Gel 15 Gram(s) Oral once  enoxaparin Injectable 40 milliGRAM(s) SubCutaneous every 24 hours  glucagon  Injectable 1 milliGRAM(s) IntraMuscular once  guaifenesin/dextromethorphan Oral Liquid 5 milliLiter(s) Oral every 6 hours PRN  pantoprazole  Injectable 40 milliGRAM(s) IV Push two times a day  predniSONE   Tablet 40 milliGRAM(s) Oral daily      PMHX/PSHX:  HTN (hypertension)    COPD mixed type    No significant past surgical history            ROS: 14 point ROS negative unless otherwise stated in subjective      PHYSICAL EXAM:     GENERAL:  Well developed, no distress  HEENT:  NC/AT,  conjunctivae clear, sclera anicteric  CHEST:  Full & symmetric excursion, no increased effort w/ respirations; +NC  HEART:  Regular rhythm & rate  ABDOMEN:  +thin, soft, non-tender, non-distended  EXTREMITIES:  no LE  edema  SKIN:  No rash/erythema/ecchymoses/petechiae/wounds/jaundice  NEURO:  Alert, orientedx3    Vital Signs:  Vital Signs Last 24 Hrs  T(C): 36.1 (11 Jul 2022 06:08), Max: 36.7 (10 Jul 2022 18:37)  T(F): 97 (11 Jul 2022 06:08), Max: 98.1 (10 Jul 2022 18:37)  HR: 70 (11 Jul 2022 06:08) (70 - 91)  BP: 120/65 (11 Jul 2022 06:08) (108/56 - 144/71)  BP(mean): 87 (10 Jul 2022 18:00) (76 - 87)  RR: 18 (11 Jul 2022 06:08) (16 - 18)  SpO2: 98% (11 Jul 2022 06:08) (91% - 98%)    Parameters below as of 11 Jul 2022 06:08  Patient On (Oxygen Delivery Method): room air      Daily Height in cm: 175.26 (10 Jul 2022 15:09)    Daily     LABS:                        12.8   9.15  )-----------( 170      ( 10 Jul 2022 06:45 )             39.7     07-10    137  |  95<L>  |  20  ----------------------------<  86  4.5   |  35<H>  |  0.76    Ca    8.9      10 Jul 2022 06:45  Phos  3.7     07-09  Mg     1.8     07-09    TPro  5.9<L>  /  Alb  3.3  /  TBili  0.5  /  DBili  x   /  AST  29  /  ALT  17  /  AlkPhos  49  07-09    LIVER FUNCTIONS - ( 09 Jul 2022 07:11 )  Alb: 3.3 g/dL / Pro: 5.9 g/dL / ALK PHOS: 49 U/L / ALT: 17 U/L / AST: 29 U/L / GGT: x           PT/INR - ( 09 Jul 2022 07:14 )   PT: 12.6 sec;   INR: 1.09 ratio                 Imaging: No new abdominal imaging               Chief Complaint:  Patient is a 87y old  Male who presents with a chief complaint of COPD exacerbation (10 Jul 2022 16:01)      Interval Events: Reports feeling well. Tolerating liquids. Denies any dysphagia, N/V/D/C, abd pain, melena or hematochezia.  Underwent emergent EGD 7/10 showing - Food in the distal esophagus s/p endoscopic evacuation of food into stomach; this was likely the cause of pt's dysphagia; examination was suspicious for achalasia; esophagitis- biopsied to rule out eosinophilic esophagitis; erythematous mucosa in the stomach; normal duodenal bulb and second portion of the duodenum.    Hospital Medications:  ALBUTerol    90 MICROgram(s) HFA Inhaler 2 Puff(s) Inhalation every 6 hours  albuterol/ipratropium for Nebulization 3 milliLiter(s) Nebulizer every 6 hours  amLODIPine   Tablet 10 milliGRAM(s) Oral daily  aspirin  chewable 81 milliGRAM(s) Oral daily  atorvastatin 40 milliGRAM(s) Oral at bedtime  buDESOnide    Inhalation Suspension 0.5 milliGRAM(s) Inhalation every 12 hours  dextrose 5%. 1000 milliLiter(s) IV Continuous <Continuous>  dextrose 50% Injectable 25 Gram(s) IV Push once  dextrose 50% Injectable 12.5 Gram(s) IV Push once  dextrose 50% Injectable 25 Gram(s) IV Push once  dextrose Oral Gel 15 Gram(s) Oral once  enoxaparin Injectable 40 milliGRAM(s) SubCutaneous every 24 hours  glucagon  Injectable 1 milliGRAM(s) IntraMuscular once  guaifenesin/dextromethorphan Oral Liquid 5 milliLiter(s) Oral every 6 hours PRN  pantoprazole  Injectable 40 milliGRAM(s) IV Push two times a day  predniSONE   Tablet 40 milliGRAM(s) Oral daily      PMHX/PSHX:  HTN (hypertension)    COPD mixed type    No significant past surgical history            ROS: 14 point ROS negative unless otherwise stated in subjective      PHYSICAL EXAM:     GENERAL:  Well developed, no distress  HEENT:  NC/AT,  conjunctivae clear, sclera anicteric  CHEST:  Full & symmetric excursion, no increased effort w/ respirations; +NC  HEART:  Regular rhythm & rate  ABDOMEN:  +thin, soft, non-tender, non-distended  EXTREMITIES:  no LE  edema  SKIN:  No rash/erythema/ecchymoses/petechiae/wounds/jaundice  NEURO:  Alert, orientedx3    Vital Signs:  Vital Signs Last 24 Hrs  T(C): 36.1 (11 Jul 2022 06:08), Max: 36.7 (10 Jul 2022 18:37)  T(F): 97 (11 Jul 2022 06:08), Max: 98.1 (10 Jul 2022 18:37)  HR: 70 (11 Jul 2022 06:08) (70 - 91)  BP: 120/65 (11 Jul 2022 06:08) (108/56 - 144/71)  BP(mean): 87 (10 Jul 2022 18:00) (76 - 87)  RR: 18 (11 Jul 2022 06:08) (16 - 18)  SpO2: 98% (11 Jul 2022 06:08) (91% - 98%)    Parameters below as of 11 Jul 2022 06:08  Patient On (Oxygen Delivery Method): room air      Daily Height in cm: 175.26 (10 Jul 2022 15:09)    Daily     LABS:                        12.8   9.15  )-----------( 170      ( 10 Jul 2022 06:45 )             39.7     07-10    137  |  95<L>  |  20  ----------------------------<  86  4.5   |  35<H>  |  0.76    Ca    8.9      10 Jul 2022 06:45  Phos  3.7     07-09  Mg     1.8     07-09    TPro  5.9<L>  /  Alb  3.3  /  TBili  0.5  /  DBili  x   /  AST  29  /  ALT  17  /  AlkPhos  49  07-09    LIVER FUNCTIONS - ( 09 Jul 2022 07:11 )  Alb: 3.3 g/dL / Pro: 5.9 g/dL / ALK PHOS: 49 U/L / ALT: 17 U/L / AST: 29 U/L / GGT: x           PT/INR - ( 09 Jul 2022 07:14 )   PT: 12.6 sec;   INR: 1.09 ratio                 Imaging: No new abdominal imaging

## 2022-07-11 NOTE — PROGRESS NOTE ADULT - NSPROGADDITIONALINFOA_GEN_ALL_CORE
.  Erinn Tomlinson MD  Division of Hospital Medicine  [facility]   Available on [employer] Teams - messages preferred prior to calls.    Plan discussed with patient and medicine NP Bonita.  Will attempt to call granddaughter later in day as per patient request as she is working. .  Erinn Tomlinson MD  Division of Hospital Medicine  [facility]   Available on [employer] Teams - messages preferred prior to calls.    Plan discussed with patient, granddaughter Liz via phone, and medicine NP Bonita.

## 2022-07-12 LAB
ANION GAP SERPL CALC-SCNC: 9 MMOL/L — SIGNIFICANT CHANGE UP (ref 5–17)
BUN SERPL-MCNC: 12 MG/DL — SIGNIFICANT CHANGE UP (ref 7–23)
CALCIUM SERPL-MCNC: 9.7 MG/DL — SIGNIFICANT CHANGE UP (ref 8.4–10.5)
CHLORIDE SERPL-SCNC: 92 MMOL/L — LOW (ref 96–108)
CO2 SERPL-SCNC: 36 MMOL/L — HIGH (ref 22–31)
CREAT SERPL-MCNC: 0.68 MG/DL — SIGNIFICANT CHANGE UP (ref 0.5–1.3)
CULTURE RESULTS: SIGNIFICANT CHANGE UP
CULTURE RESULTS: SIGNIFICANT CHANGE UP
EGFR: 91 ML/MIN/1.73M2 — SIGNIFICANT CHANGE UP
GLUCOSE BLDC GLUCOMTR-MCNC: 113 MG/DL — HIGH (ref 70–99)
GLUCOSE BLDC GLUCOMTR-MCNC: 129 MG/DL — HIGH (ref 70–99)
GLUCOSE BLDC GLUCOMTR-MCNC: 70 MG/DL — SIGNIFICANT CHANGE UP (ref 70–99)
GLUCOSE BLDC GLUCOMTR-MCNC: 93 MG/DL — SIGNIFICANT CHANGE UP (ref 70–99)
GLUCOSE SERPL-MCNC: 106 MG/DL — HIGH (ref 70–99)
HCT VFR BLD CALC: 45 % — SIGNIFICANT CHANGE UP (ref 39–50)
HCT VFR BLD CALC: 45.4 % — SIGNIFICANT CHANGE UP (ref 39–50)
HGB BLD-MCNC: 14.7 G/DL — SIGNIFICANT CHANGE UP (ref 13–17)
HGB BLD-MCNC: 15 G/DL — SIGNIFICANT CHANGE UP (ref 13–17)
MAGNESIUM SERPL-MCNC: 1.6 MG/DL — SIGNIFICANT CHANGE UP (ref 1.6–2.6)
MCHC RBC-ENTMCNC: 31.3 PG — SIGNIFICANT CHANGE UP (ref 27–34)
MCHC RBC-ENTMCNC: 31.9 PG — SIGNIFICANT CHANGE UP (ref 27–34)
MCHC RBC-ENTMCNC: 32.7 GM/DL — SIGNIFICANT CHANGE UP (ref 32–36)
MCHC RBC-ENTMCNC: 33 GM/DL — SIGNIFICANT CHANGE UP (ref 32–36)
MCV RBC AUTO: 94.6 FL — SIGNIFICANT CHANGE UP (ref 80–100)
MCV RBC AUTO: 97.6 FL — SIGNIFICANT CHANGE UP (ref 80–100)
NRBC # BLD: 0 /100 WBCS — SIGNIFICANT CHANGE UP (ref 0–0)
NRBC # BLD: 0 /100 WBCS — SIGNIFICANT CHANGE UP (ref 0–0)
PHOSPHATE SERPL-MCNC: 2.2 MG/DL — LOW (ref 2.5–4.5)
PLATELET # BLD AUTO: 218 K/UL — SIGNIFICANT CHANGE UP (ref 150–400)
PLATELET # BLD AUTO: 246 K/UL — SIGNIFICANT CHANGE UP (ref 150–400)
POTASSIUM SERPL-MCNC: 4.6 MMOL/L — SIGNIFICANT CHANGE UP (ref 3.5–5.3)
POTASSIUM SERPL-SCNC: 4.6 MMOL/L — SIGNIFICANT CHANGE UP (ref 3.5–5.3)
RBC # BLD: 4.61 M/UL — SIGNIFICANT CHANGE UP (ref 4.2–5.8)
RBC # BLD: 4.8 M/UL — SIGNIFICANT CHANGE UP (ref 4.2–5.8)
RBC # FLD: 13.1 % — SIGNIFICANT CHANGE UP (ref 10.3–14.5)
RBC # FLD: 13.2 % — SIGNIFICANT CHANGE UP (ref 10.3–14.5)
SODIUM SERPL-SCNC: 137 MMOL/L — SIGNIFICANT CHANGE UP (ref 135–145)
SPECIMEN SOURCE: SIGNIFICANT CHANGE UP
SPECIMEN SOURCE: SIGNIFICANT CHANGE UP
WBC # BLD: 14.55 K/UL — HIGH (ref 3.8–10.5)
WBC # BLD: 7.73 K/UL — SIGNIFICANT CHANGE UP (ref 3.8–10.5)
WBC # FLD AUTO: 14.55 K/UL — HIGH (ref 3.8–10.5)
WBC # FLD AUTO: 7.73 K/UL — SIGNIFICANT CHANGE UP (ref 3.8–10.5)

## 2022-07-12 PROCEDURE — 99233 SBSQ HOSP IP/OBS HIGH 50: CPT

## 2022-07-12 PROCEDURE — 99232 SBSQ HOSP IP/OBS MODERATE 35: CPT | Mod: GC

## 2022-07-12 PROCEDURE — 71045 X-RAY EXAM CHEST 1 VIEW: CPT | Mod: 26

## 2022-07-12 RX ORDER — MAGNESIUM SULFATE 500 MG/ML
2 VIAL (ML) INJECTION ONCE
Refills: 0 | Status: COMPLETED | OUTPATIENT
Start: 2022-07-12 | End: 2022-07-12

## 2022-07-12 RX ORDER — SODIUM CHLORIDE 9 MG/ML
1000 INJECTION, SOLUTION INTRAVENOUS
Refills: 0 | Status: DISCONTINUED | OUTPATIENT
Start: 2022-07-12 | End: 2022-07-13

## 2022-07-12 RX ORDER — SODIUM,POTASSIUM PHOSPHATES 278-250MG
2 POWDER IN PACKET (EA) ORAL ONCE
Refills: 0 | Status: COMPLETED | OUTPATIENT
Start: 2022-07-12 | End: 2022-07-12

## 2022-07-12 RX ORDER — IPRATROPIUM/ALBUTEROL SULFATE 18-103MCG
3 AEROSOL WITH ADAPTER (GRAM) INHALATION ONCE
Refills: 0 | Status: DISCONTINUED | OUTPATIENT
Start: 2022-07-12 | End: 2022-07-14

## 2022-07-12 RX ORDER — IPRATROPIUM/ALBUTEROL SULFATE 18-103MCG
3 AEROSOL WITH ADAPTER (GRAM) INHALATION EVERY 6 HOURS
Refills: 0 | Status: DISCONTINUED | OUTPATIENT
Start: 2022-07-12 | End: 2022-07-14

## 2022-07-12 RX ADMIN — AMLODIPINE BESYLATE 10 MILLIGRAM(S): 2.5 TABLET ORAL at 05:24

## 2022-07-12 RX ADMIN — Medication 3 MILLILITER(S): at 00:01

## 2022-07-12 RX ADMIN — ENOXAPARIN SODIUM 40 MILLIGRAM(S): 100 INJECTION SUBCUTANEOUS at 14:34

## 2022-07-12 RX ADMIN — Medication 81 MILLIGRAM(S): at 12:21

## 2022-07-12 RX ADMIN — Medication 2 PACKET(S): at 12:21

## 2022-07-12 RX ADMIN — PANTOPRAZOLE SODIUM 40 MILLIGRAM(S): 20 TABLET, DELAYED RELEASE ORAL at 05:23

## 2022-07-12 RX ADMIN — Medication 3 MILLILITER(S): at 12:50

## 2022-07-12 RX ADMIN — SODIUM CHLORIDE 40 MILLILITER(S): 9 INJECTION, SOLUTION INTRAVENOUS at 21:15

## 2022-07-12 RX ADMIN — Medication 40 MILLIGRAM(S): at 05:22

## 2022-07-12 RX ADMIN — Medication 0.5 MILLIGRAM(S): at 05:23

## 2022-07-12 RX ADMIN — Medication 0.5 MILLIGRAM(S): at 17:53

## 2022-07-12 RX ADMIN — Medication 25 GRAM(S): at 12:22

## 2022-07-12 RX ADMIN — Medication 3 MILLILITER(S): at 12:22

## 2022-07-12 RX ADMIN — ATORVASTATIN CALCIUM 40 MILLIGRAM(S): 80 TABLET, FILM COATED ORAL at 21:15

## 2022-07-12 RX ADMIN — SODIUM CHLORIDE 50 MILLILITER(S): 9 INJECTION, SOLUTION INTRAVENOUS at 05:22

## 2022-07-12 RX ADMIN — SODIUM CHLORIDE 40 MILLILITER(S): 9 INJECTION, SOLUTION INTRAVENOUS at 14:56

## 2022-07-12 RX ADMIN — Medication 3 MILLILITER(S): at 18:05

## 2022-07-12 RX ADMIN — Medication 3 MILLILITER(S): at 05:23

## 2022-07-12 RX ADMIN — PANTOPRAZOLE SODIUM 40 MILLIGRAM(S): 20 TABLET, DELAYED RELEASE ORAL at 17:53

## 2022-07-12 RX ADMIN — Medication 3 MILLILITER(S): at 23:37

## 2022-07-12 NOTE — CHART NOTE - NSCHARTNOTEFT_GEN_A_CORE
88 y/o with dysphagia/food impaction s/p EGD; pt is on cleared liquid diet; pending MDS; per GI- Dr. Laird, MBS can be done with soft diet.

## 2022-07-12 NOTE — PROGRESS NOTE ADULT - SUBJECTIVE AND OBJECTIVE BOX
Erinn Tomlinson MD  Division of Hospital Medicine  [facility]   Available on [employer] Teams (Mon-Fri 8am-5pm)    * messages preferred prior to calls  Other Times:  [phone]      Patient is a 87y old  Male who presents with a chief complaint of COPD exacerbation (12 Jul 2022 11:12)      SUBJECTIVE / OVERNIGHT EVENTS: no acute events overnight. endorsed transient SOB overnight that resolved by time of my exam. awaiting MBS today.  ADDITIONAL REVIEW OF SYSTEMS:    MEDICATIONS  (STANDING):  ALBUTerol    90 MICROgram(s) HFA Inhaler 2 Puff(s) Inhalation every 6 hours  albuterol/ipratropium for Nebulization 3 milliLiter(s) Nebulizer every 6 hours  albuterol/ipratropium for Nebulization. 3 milliLiter(s) Nebulizer once  amLODIPine   Tablet 10 milliGRAM(s) Oral daily  aspirin  chewable 81 milliGRAM(s) Oral daily  atorvastatin 40 milliGRAM(s) Oral at bedtime  buDESOnide    Inhalation Suspension 0.5 milliGRAM(s) Inhalation every 12 hours  dextrose 50% Injectable 25 Gram(s) IV Push once  dextrose 50% Injectable 12.5 Gram(s) IV Push once  dextrose 50% Injectable 25 Gram(s) IV Push once  dextrose Oral Gel 15 Gram(s) Oral once  enoxaparin Injectable 40 milliGRAM(s) SubCutaneous every 24 hours  glucagon  Injectable 1 milliGRAM(s) IntraMuscular once  pantoprazole  Injectable 40 milliGRAM(s) IV Push two times a day    MEDICATIONS  (PRN):  albuterol/ipratropium for Nebulization 3 milliLiter(s) Nebulizer every 6 hours PRN Shortness of Breath and/or Wheezing  guaifenesin/dextromethorphan Oral Liquid 5 milliLiter(s) Oral every 6 hours PRN Persistent coughing      CAPILLARY BLOOD GLUCOSE      POCT Blood Glucose.: 93 mg/dL (12 Jul 2022 11:33)  POCT Blood Glucose.: 70 mg/dL (12 Jul 2022 07:50)  POCT Blood Glucose.: 108 mg/dL (11 Jul 2022 21:58)  POCT Blood Glucose.: 102 mg/dL (11 Jul 2022 17:06)  POCT Blood Glucose.: 56 mg/dL (11 Jul 2022 16:37)    I&O's Summary    11 Jul 2022 07:01  -  12 Jul 2022 07:00  --------------------------------------------------------  IN: 970 mL / OUT: 1450 mL / NET: -480 mL    12 Jul 2022 07:01  -  12 Jul 2022 13:33  --------------------------------------------------------  IN: 420 mL / OUT: 600 mL / NET: -180 mL        PHYSICAL EXAM:  Vital Signs Last 24 Hrs  T(C): 36.6 (12 Jul 2022 08:00), Max: 36.7 (12 Jul 2022 00:08)  T(F): 97.9 (12 Jul 2022 08:00), Max: 98 (12 Jul 2022 00:08)  HR: 79 (12 Jul 2022 08:00) (61 - 79)  BP: 140/71 (12 Jul 2022 08:00) (118/57 - 140/71)  BP(mean): --  RR: 18 (12 Jul 2022 08:00) (18 - 18)  SpO2: 95% (12 Jul 2022 08:00) (93% - 98%)    Parameters below as of 12 Jul 2022 09:02  Patient On (Oxygen Delivery Method): nasal cannula      CONSTITUTIONAL: NAD, well-developed, well-groomed  EYES: PERRLA; conjunctiva and sclera clear  ENMT: Moist oral mucosa, no pharyngeal injection or exudates; normal dentition  NECK: Supple, no palpable masses; no thyromegaly  RESPIRATORY: Normal respiratory effort; lungs are clear to auscultation bilaterally, no wheeze nor crackles  CARDIOVASCULAR: Regular rate and rhythm, normal S1 and S2, no murmur/rub/gallop; No lower extremity edema; Peripheral pulses are 2+ bilaterally  ABDOMEN: Soft, Nondistended, Nontender to palpation, normoactive bowel sounds  MUSCULOSKELETAL:  No clubbing or cyanosis of digits; no joint swelling or tenderness to palpation  PSYCH: A+O to person, place, and time; affect appropriate  NEUROLOGY: CN 2-12 are intact and symmetric; no gross sensory deficits   SKIN: No rashes; no palpable lesions    LABS:                        14.7   7.73  )-----------( 218      ( 12 Jul 2022 11:22 )             45.0     07-12    137  |  92<L>  |  12  ----------------------------<  106<H>  4.6   |  36<H>  |  0.68    Ca    9.7      12 Jul 2022 11:22  Phos  2.2     07-12  Mg     1.6     07-12      RADIOLOGY & ADDITIONAL TESTS:  Results Reviewed: hypomagnesemia repleted  Imaging Personally Reviewed:  Electrocardiogram Personally Reviewed:    COORDINATION OF CARE:  Care Discussed with Consultants/Other Providers [Y]: medicine AFSHAN Mesa  Prior or Outpatient Records Reviewed [Y/N]:

## 2022-07-12 NOTE — PROGRESS NOTE ADULT - SUBJECTIVE AND OBJECTIVE BOX
Follow-up Pulm Progress Note    The patient was seen and examined. Notes reviewed and discussed with staff/team as applicable      No new respiratory events overnight.       Denies: SOB, Chest pain, increased cough, colored phlegm, hemoptysis, N/V/D, neck stiffness, dysuria      Vital Signs Last 24 Hrs  T(C): 36.7 (12 Jul 2022 00:08), Max: 36.7 (12 Jul 2022 00:08)  T(F): 98 (12 Jul 2022 00:08), Max: 98 (12 Jul 2022 00:08)  HR: 61 (12 Jul 2022 00:08) (61 - 71)  BP: 118/57 (12 Jul 2022 00:08) (118/57 - 129/81)  BP(mean): --  RR: 18 (12 Jul 2022 00:08) (18 - 18)  SpO2: 98% (12 Jul 2022 00:08) (93% - 98%)    Parameters below as of 12 Jul 2022 00:08  Patient On (Oxygen Delivery Method): room air              07-11 @ 07:01  -  07-12 @ 07:00  --------------------------------------------------------  IN: 970 mL / OUT: 1450 mL / NET: -480 mL          Medications:  MEDICATIONS  (STANDING):  ALBUTerol    90 MICROgram(s) HFA Inhaler 2 Puff(s) Inhalation every 6 hours  albuterol/ipratropium for Nebulization 3 milliLiter(s) Nebulizer every 6 hours  amLODIPine   Tablet 10 milliGRAM(s) Oral daily  aspirin  chewable 81 milliGRAM(s) Oral daily  atorvastatin 40 milliGRAM(s) Oral at bedtime  buDESOnide    Inhalation Suspension 0.5 milliGRAM(s) Inhalation every 12 hours  dextrose 5%. 1000 milliLiter(s) (50 mL/Hr) IV Continuous <Continuous>  dextrose 50% Injectable 25 Gram(s) IV Push once  dextrose 50% Injectable 12.5 Gram(s) IV Push once  dextrose 50% Injectable 25 Gram(s) IV Push once  dextrose Oral Gel 15 Gram(s) Oral once  enoxaparin Injectable 40 milliGRAM(s) SubCutaneous every 24 hours  glucagon  Injectable 1 milliGRAM(s) IntraMuscular once  pantoprazole  Injectable 40 milliGRAM(s) IV Push two times a day    MEDICATIONS  (PRN):  guaifenesin/dextromethorphan Oral Liquid 5 milliLiter(s) Oral every 6 hours PRN Persistent coughing      Allergies    No Known Allergies            Physical Examination:    Pleasant thin frail white man NAD  Neck: no JVD, LAD, accessory muscle use  PULM: Clear to auscultation bilaterally, no wheezes, rales, rhonchi  CVS: Regular rate and rhythm, S1S2, no murmurs, rubs, or gallops  Abdomen: soft, NT  Extremities: no edema  Neuro: non focal        CAPILLARY BLOOD GLUCOSE      POCT Blood Glucose.: 108 mg/dL (11 Jul 2022 21:58)              CULTURES:  Culture Results:   Normal Respiratory Maggi present (07-08 @ 07:05)  Culture Results:   No growth to date. (07-07 @ 13:27)  Culture Results:   No growth to date. (07-07 @ 13:23)    Most recent blood culture -- 07-08 @ 07:05   -- -- .Sputum Sputum 07-08 @ 07:05  Most recent blood culture -- 07-07 @ 13:27   -- -- .Blood Blood 07-07 @ 13:27  Most recent blood culture -- 07-07 @ 13:23   -- -- .Blood Blood 07-07 @ 13:23

## 2022-07-12 NOTE — CHART NOTE - NSCHARTNOTEFT_GEN_A_CORE
88 y/o male admitted with copd exa s/p extubation, noted with food impaction now with reported sob x2 episode today that resolved with duoneb ( bilaterally mod wheeze noted). CXR prelim report showed clear lungs, c/w prednisone and inhalers; if sob recur can give solumedrol 40mg x1 as d/w Dr. Modi and Dr. Tomlinson. 86 y/o male admitted with copd exa s/p extubation, noted with food impaction now with reported sob x2 episode today that resolved with duoneb ( bilaterally mod wheeze noted). CXR prelim report showed clear lungs, c/w prednisone and inhalers; if sob recur can consider giving solumedrol 40mg x1 as d/w Dr. Modi and Dr. Tomlinson.

## 2022-07-12 NOTE — PROGRESS NOTE ADULT - PROBLEM SELECTOR PLAN 1
overall improved s/p intubation and MICU monitoring   - RVP/covid neg, sputum cx neg, cxr clear   - s/p azithro  - steriod taper as per pulmonary, s/p prednisone 40mg daily on 7/12 tapered to 20mg daily to start 7/13  - c/w duonebs ATC, add PRN for SOB/wheeze  - c/w budesonide  - on Incruse ellipta at home - continue on discharge   - titrate down O2 as tolerated - currently on 1LNC (not on home O2)  - repeat CXR today given new dyspnea  - Pulmonary following, recs appreciated

## 2022-07-12 NOTE — CHART NOTE - NSCHARTNOTEFT_GEN_A_CORE
Nutrition Follow Up Note  Patient seen for: malnutrition follow up    Source: [x] Patient       [x] Medical Record        [x] RN        [] Family at bedside       [] Other:    -If unable to interview patient: [] Trach/Vent/BiPAP  [] Disoriented/confused/inappropriate to interview    Chart Reviewed. Events noted. Per chart: "86 yo male with PMH COPD (not on home O2), HTN, and HLD who presents with acute hypercarbic respiratory failure 2/2 COPD exacerbation with course c/b dysphagia and food impaction."    Diet Order:   Diet, Consistent Carbohydrate Clear Liquid (07-10-22)    - Is current order appropriate/adequate? see below for recommendations.     - PO intake : pr drinking 100% liquids per writer observation at bedside this morning.   S/p swallow bedside assessment  with recommendations: "Continue clear liquid diet as per GI"    - Nutrition-related concerns:      -prednisone ordered, POCT being monitored.       -per GI: "food impaction- s/p EGD with endoscopic passage of food from esophagus into stomach". GI recommending clear liquid diet for now       -VFSS/MBS; Exam scheduled for  (today)        GI:  Last BM  per flow sheets.   Bowel Regimen? [] Yes   [x] No    Weights:   Dosing Weight 88 pounds / 40kg  Daily Weight in k ()  Per Dietitian note : "Wt trend (per [facility]): (10/19/21): 110.7 lbs. Overall wt loss of 22.7 lbs/20.5% x ~9 months, significant".  RD will continue to monitor trends.    Nutritionally Pertinent MEDICATIONS  (STANDING):  amLODIPine   Tablet  atorvastatin  dextrose 5%.  dextrose 50% Injectable  dextrose 50% Injectable  dextrose 50% Injectable  dextrose Oral Gel  glucagon  Injectable  pantoprazole  Injectable  prednisone     Pertinent Labs:   A1C with Estimated Average Glucose Result: 6.1 % (22 @ 07:13)    Finger Sticks:  POCT Blood Glucose.: 70 mg/dL ( @ 07:50)  POCT Blood Glucose.: 108 mg/dL ( @ 21:58)  POCT Blood Glucose.: 102 mg/dL ( @ 17:06)  POCT Blood Glucose.: 56 mg/dL ( @ 16:37)  POCT Blood Glucose.: 114 mg/dL ( @ 11:32)    Skin per nursing documentation: No pressure injuries noted.  Edema per nursing documentation: none noted per flow sheets.    Estimated Needs:   [x] no change since previous assessment  [] recalculated:     Previous Nutrition Diagnosis: chronic severe protein calorie malnutrition  Nutrition Diagnosis is: [x] ongoing  [] resolved [] not applicable     Nutrition Care Plan:  [x] In Progress  [] Achieved  [] Not applicable    New Nutrition Diagnosis: [x] Not applicable    Nutrition Interventions:     Education Provided   [] Yes:  [x] No: not warranted at this time    Recommendations:      -Defer diet texture/liquid consistency to team/SLP  -Recommend liberalize diet to NO therapeutic restrictions.  -IF diet advanced (pending MBS today), recommend Ensure Enlive 2x daily to aid in energy/nutrient optimization (thickened PRN per SLP recommendations)  -Recommend multivitamin (if no medication contraindications) to aid in prevention of micronutrient deficiencies    Monitoring and Evaluation:   Continue to monitor nutritional intake, tolerance to diet prescription, weights, labs, skin integrity    RD remains available upon request and will follow up per protocol  Sherice Costa, MS, RD, CDN

## 2022-07-12 NOTE — PROGRESS NOTE ADULT - ASSESSMENT
88 yo M with h/o COPD, HTN, HLD, p/w acute hypercarbic respiratory failure due to COPD exacerbation. GI called to evaluate pt this evening (7/9) after pt had chicken for lunch and felt dysphagia afterwards. ENT was called for evaluation after sensation of chicken stuck in throat. ENT performed indirect laryngoscopy which was clear for any foreign body pt. On 7/10 after eating breakfast was not tolerating secretions. Underwent emergent EGD 7/10 showing - Food in the distal esophagus s/p endoscopic evacuation of food into stomach; this was likely the cause of pt's dysphagia; examination was suspicious for achalasia; esophagitis- biopsied to rule out eosinophilic esophagitis; erythematous mucosa in the stomach; normal duodenal bulb and second portion of the duodenum.     Impression:  #Dysphagia  #food impaction- s/p EGD with endoscopic passage of food from esophagus into stomach  #c/f underlying chronic aspiration-per nurse pt's grandchildren report frequent coughing after meal  Differential for chronic dysphagia/aspiration includes oropharyngeal phase (neurological, muscular weakness) with possible underlying achalasia. Underwent emergent EGD 7/10 showing - Food in the distal esophagus s/p endoscopic evacuation of food into stomach; this was likely the cause of pt's dysphagia; examination was suspicious for achalasia; esophagitis- biopsied to rule out eosinophilic esophagitis; erythematous mucosa in the stomach; normal duodenal bulb and second portion of the duodenum    Recommendations:    88 yo M with h/o COPD, HTN, HLD, p/w acute hypercarbic respiratory failure due to COPD exacerbation. GI called to evaluate pt this evening (7/9) after pt had chicken for lunch and felt dysphagia afterwards. ENT was called for evaluation after sensation of chicken stuck in throat. ENT performed indirect laryngoscopy which was clear for any foreign body pt. On 7/10 after eating breakfast was not tolerating secretions. Underwent emergent EGD 7/10 showing - Food in the distal esophagus s/p endoscopic evacuation of food into stomach; this was likely the cause of pt's dysphagia; examination was suspicious for achalasia; esophagitis- biopsied to rule out eosinophilic esophagitis; erythematous mucosa in the stomach; normal duodenal bulb and second portion of the duodenum.     Impression:  #Dysphagia secondary to food impaction in the distal esophagus s/p EGD evacuation of food into the stomach  Patient has a history of chronic aspiration with frequent coughing and choking episodes. Differential for chronic dysphagia/aspiration includes oropharyngeal phase (neurological, muscular weakness) with possible underlying achalasia. Underwent emergent EGD 7/10 showing - Food in the distal esophagus s/p endoscopic evacuation of food into stomach; this was likely the cause of pt's dysphagia; examination was suspicious for achalasia; esophagitis- biopsied to rule out eosinophilic esophagitis. Patient would benefit from MBS + Barium esophagram    Recommendations:   - pending MBS + barium esophagram  - awaiting pathology results    GI will continue to follow.     All recommendations are tentative until note is attested by an attending.     Sisi Myers, PGY-4  Gastroenterology/Hepatology Fellow  Available on [employer] Teams  [phone] (Short Range Pager)  [phone] (Long Range Pager)    After 5pm, please contact the on-call GI fellow. [phone] 86 yo M with h/o COPD, HTN, HLD, p/w acute hypercarbic respiratory failure due to COPD exacerbation. GI called to evaluate pt this evening (7/9) after pt had chicken for lunch and felt dysphagia afterwards. ENT was called for evaluation after sensation of chicken stuck in throat. ENT performed indirect laryngoscopy which was clear for any foreign body pt. On 7/10 after eating breakfast was not tolerating secretions. Underwent emergent EGD 7/10 showing - Food in the distal esophagus s/p endoscopic evacuation of food into stomach; this was likely the cause of pt's dysphagia; examination was suspicious for achalasia; esophagitis- biopsied to rule out eosinophilic esophagitis; erythematous mucosa in the stomach; normal duodenal bulb and second portion of the duodenum.     Impression:  #Dysphagia secondary to food impaction in the distal esophagus s/p EGD evacuation of food into the stomach  Patient has a history of chronic aspiration with frequent coughing and choking episodes. Differential for chronic dysphagia/aspiration includes oropharyngeal phase (neurological, muscular weakness) with possible underlying achalasia. Underwent emergent EGD 7/10 showing - Food in the distal esophagus s/p endoscopic evacuation of food into stomach; this was likely the cause of pt's dysphagia; examination was suspicious for achalasia; esophagitis- biopsied to rule out eosinophilic esophagitis. Patient would benefit from MBS + Barium esophagram    Recommendations:   - pending MBS + barium esophagram  - awaiting pathology results  - Aspiration precautions, sit upright during and after eating/drinking    GI will continue to follow.     All recommendations are tentative until note is attested by an attending.     Sisi Myers, PGY-4  Gastroenterology/Hepatology Fellow  Available on [employer] Teams  [phone] (Short Range Pager)  [phone] (Long Range Pager)    After 5pm, please contact the on-call GI fellow. [phone]

## 2022-07-12 NOTE — PROGRESS NOTE ADULT - ASSESSMENT
Impression:  COPD exacerbation requiring intubation, triggered by aspiration?    Dysphagia, s/p EGD with food impaction. GI evaluation ongoing    Plan:    Continuing supplemental O2 to maintain saturations 92% or above watching for evidence of CO2 retention  Maintaining current nebulizer regimen  Prednisone 20mg/day with taper  Encouraging secretion clearance, pulmonary toilet prn.   Incentive spirometry.  Elevating HOB  Judicious CV/fluid management.  Guaifenesin dm cough syrup as needed  Dopplers negative  Aspiration precautions per GI  GI and speech and swallowing follow up. MBS scheduled  Mobilization as is feasible.  Routine GI and DVT prophylaxis.    Juan Manuel Modi MD  Pulmonary Medicine  [phone]

## 2022-07-12 NOTE — PROGRESS NOTE ADULT - SUBJECTIVE AND OBJECTIVE BOX
Gastroenterology/Hepatology Progress Note      Interval Events:     No acute events overnight. Patient has been tolerting PO liquid diet. Had non bloody BM yesterday. Denied dysphagia, odynophagia, nausea and vomiting.     Allergies:  No Known Allergies      Hospital Medications:  ALBUTerol    90 MICROgram(s) HFA Inhaler 2 Puff(s) Inhalation every 6 hours  albuterol/ipratropium for Nebulization 3 milliLiter(s) Nebulizer every 6 hours  amLODIPine   Tablet 10 milliGRAM(s) Oral daily  aspirin  chewable 81 milliGRAM(s) Oral daily  atorvastatin 40 milliGRAM(s) Oral at bedtime  buDESOnide    Inhalation Suspension 0.5 milliGRAM(s) Inhalation every 12 hours  dextrose 5%. 1000 milliLiter(s) IV Continuous <Continuous>  dextrose 50% Injectable 25 Gram(s) IV Push once  dextrose 50% Injectable 12.5 Gram(s) IV Push once  dextrose 50% Injectable 25 Gram(s) IV Push once  dextrose Oral Gel 15 Gram(s) Oral once  enoxaparin Injectable 40 milliGRAM(s) SubCutaneous every 24 hours  glucagon  Injectable 1 milliGRAM(s) IntraMuscular once  guaifenesin/dextromethorphan Oral Liquid 5 milliLiter(s) Oral every 6 hours PRN  pantoprazole  Injectable 40 milliGRAM(s) IV Push two times a day      ROS: 14 point ROS negative unless otherwise state in subjective    PHYSICAL EXAM:   Vital Signs:  Vital Signs Last 24 Hrs  T(C): 36.6 (12 Jul 2022 08:00), Max: 36.7 (12 Jul 2022 00:08)  T(F): 97.9 (12 Jul 2022 08:00), Max: 98 (12 Jul 2022 00:08)  HR: 79 (12 Jul 2022 08:00) (61 - 79)  BP: 140/71 (12 Jul 2022 08:00) (118/57 - 140/71)  BP(mean): --  RR: 18 (12 Jul 2022 08:00) (18 - 18)  SpO2: 95% (12 Jul 2022 08:00) (93% - 98%)    Parameters below as of 12 Jul 2022 09:02  Patient On (Oxygen Delivery Method): nasal cannula      Daily     Daily     GENERAL:  No acute distress  HEENT:  NCAT, no scleral icterus  CHEST: no resp distress  HEART:  RRR  ABDOMEN:  Soft, non-tender, non-distended, normoactive bowel sounds, no masses  EXTREMITIES:  No cyanosis, clubbing, or edema  SKIN:  No rash/erythema/ecchymoses/petechiae/wounds/abscess/warm/dry  NEURO:  Alert and oriented x 3, no asterixis, no tremor    LABS:    No recent labs for the past 48 hours.     Last set of labs on 7/10  hgb 12.6  Na 137  K 4.5

## 2022-07-12 NOTE — PROGRESS NOTE ADULT - NSPROGADDITIONALINFOA_GEN_ALL_CORE
.  Erinn Tomlinson MD  Division of Hospital Medicine  [facility]   Available on Microsoft Teams - messages preferred prior to calls.    Plan discussed with patient, granddaughter Liz via phone on 7/11, and medicine NP Bonita. .  Erinn Tomlinson MD  Division of Hospital Medicine  [facility]   Available on [employer] Teams - messages preferred prior to calls.    Plan discussed with patient, granddaughter Liz via phone on 7/11, and medicine NP Bonita.  Attempted to call granddaughter today with update but no answer. will try again tomorrow. .  Erinn Tomlinson MD  Division of Hospital Medicine  [facility]   Available on [employer] Teams - messages preferred prior to calls.    Plan discussed with patient, granddaughter Liz via phone on 7/11, and medicine NP Bonita.  Attempted to call granddaughter twice today with update but no answer. will try again tomorrow.

## 2022-07-13 LAB
ANION GAP SERPL CALC-SCNC: 11 MMOL/L — SIGNIFICANT CHANGE UP (ref 5–17)
BUN SERPL-MCNC: 13 MG/DL — SIGNIFICANT CHANGE UP (ref 7–23)
CALCIUM SERPL-MCNC: 9.4 MG/DL — SIGNIFICANT CHANGE UP (ref 8.4–10.5)
CHLORIDE SERPL-SCNC: 88 MMOL/L — LOW (ref 96–108)
CO2 SERPL-SCNC: 34 MMOL/L — HIGH (ref 22–31)
CREAT SERPL-MCNC: 0.72 MG/DL — SIGNIFICANT CHANGE UP (ref 0.5–1.3)
EGFR: 89 ML/MIN/1.73M2 — SIGNIFICANT CHANGE UP
GLUCOSE BLDC GLUCOMTR-MCNC: 105 MG/DL — HIGH (ref 70–99)
GLUCOSE BLDC GLUCOMTR-MCNC: 116 MG/DL — HIGH (ref 70–99)
GLUCOSE BLDC GLUCOMTR-MCNC: 122 MG/DL — HIGH (ref 70–99)
GLUCOSE BLDC GLUCOMTR-MCNC: 97 MG/DL — SIGNIFICANT CHANGE UP (ref 70–99)
GLUCOSE SERPL-MCNC: 85 MG/DL — SIGNIFICANT CHANGE UP (ref 70–99)
MAGNESIUM SERPL-MCNC: 1.9 MG/DL — SIGNIFICANT CHANGE UP (ref 1.6–2.6)
PHOSPHATE SERPL-MCNC: 2.8 MG/DL — SIGNIFICANT CHANGE UP (ref 2.5–4.5)
POTASSIUM SERPL-MCNC: 4.9 MMOL/L — SIGNIFICANT CHANGE UP (ref 3.5–5.3)
POTASSIUM SERPL-SCNC: 4.9 MMOL/L — SIGNIFICANT CHANGE UP (ref 3.5–5.3)
SODIUM SERPL-SCNC: 133 MMOL/L — LOW (ref 135–145)

## 2022-07-13 PROCEDURE — 99233 SBSQ HOSP IP/OBS HIGH 50: CPT

## 2022-07-13 PROCEDURE — 99232 SBSQ HOSP IP/OBS MODERATE 35: CPT | Mod: GC

## 2022-07-13 PROCEDURE — 74230 X-RAY XM SWLNG FUNCJ C+: CPT | Mod: 26

## 2022-07-13 RX ORDER — MAGNESIUM SULFATE 500 MG/ML
1 VIAL (ML) INJECTION ONCE
Refills: 0 | Status: COMPLETED | OUTPATIENT
Start: 2022-07-13 | End: 2022-07-13

## 2022-07-13 RX ADMIN — Medication 0.5 MILLIGRAM(S): at 17:49

## 2022-07-13 RX ADMIN — PANTOPRAZOLE SODIUM 40 MILLIGRAM(S): 20 TABLET, DELAYED RELEASE ORAL at 17:49

## 2022-07-13 RX ADMIN — Medication 20 MILLIGRAM(S): at 05:12

## 2022-07-13 RX ADMIN — Medication 3 MILLILITER(S): at 12:05

## 2022-07-13 RX ADMIN — SODIUM CHLORIDE 40 MILLILITER(S): 9 INJECTION, SOLUTION INTRAVENOUS at 05:19

## 2022-07-13 RX ADMIN — Medication 3 MILLILITER(S): at 23:43

## 2022-07-13 RX ADMIN — Medication 81 MILLIGRAM(S): at 12:04

## 2022-07-13 RX ADMIN — AMLODIPINE BESYLATE 10 MILLIGRAM(S): 2.5 TABLET ORAL at 05:12

## 2022-07-13 RX ADMIN — Medication 0.5 MILLIGRAM(S): at 05:12

## 2022-07-13 RX ADMIN — ENOXAPARIN SODIUM 40 MILLIGRAM(S): 100 INJECTION SUBCUTANEOUS at 15:59

## 2022-07-13 RX ADMIN — Medication 3 MILLILITER(S): at 17:49

## 2022-07-13 RX ADMIN — Medication 100 GRAM(S): at 09:55

## 2022-07-13 RX ADMIN — PANTOPRAZOLE SODIUM 40 MILLIGRAM(S): 20 TABLET, DELAYED RELEASE ORAL at 05:11

## 2022-07-13 RX ADMIN — ATORVASTATIN CALCIUM 40 MILLIGRAM(S): 80 TABLET, FILM COATED ORAL at 21:42

## 2022-07-13 RX ADMIN — Medication 3 MILLILITER(S): at 05:11

## 2022-07-13 NOTE — SWALLOW VFSS/MBS ASSESSMENT ADULT - DIAGNOSTIC IMPRESSIONS
"Daniel" Geher is p/w a mild oropharyngeal dysphagia, which remains functional for continuation of clear thin liquids as per GI. Oral phase marked by adequate orientation/reception with reduced control of bolus leading to premature spillage behind the BoT prior to the swallow response. Pharyngeal phase marked by latency in the swallow response leading to shallow penetration w/ all textures, but appearing to be fully retrieved throughout entirety of study even w/ exhaustive, serial sip presentations. Of note, cricopharyngeal bar was noted which intermittently negatively impacting full epiglottic retroflexion resulting in mild residue, which partially to fully cleared w/ thin liquid wash or dry re-swallow independently performed by the patient. No aspiration captured during study.

## 2022-07-13 NOTE — SWALLOW VFSS/MBS ASSESSMENT ADULT - ORAL PHASE
Delayed oral transit time/Incomplete tongue to palate contact/Uncontrolled bolus / spillover in kenyon-pharynx/Uncontrolled bolus / spillover in hypopharynx

## 2022-07-13 NOTE — PROGRESS NOTE ADULT - SUBJECTIVE AND OBJECTIVE BOX
Gastroenterology/Hepatology Progress Note      Interval Events:     Allergies:  No Known Allergies      Hospital Medications:  ALBUTerol    90 MICROgram(s) HFA Inhaler 2 Puff(s) Inhalation every 6 hours  albuterol/ipratropium for Nebulization 3 milliLiter(s) Nebulizer every 6 hours  albuterol/ipratropium for Nebulization 3 milliLiter(s) Nebulizer every 6 hours PRN  albuterol/ipratropium for Nebulization. 3 milliLiter(s) Nebulizer once  amLODIPine   Tablet 10 milliGRAM(s) Oral daily  aspirin  chewable 81 milliGRAM(s) Oral daily  atorvastatin 40 milliGRAM(s) Oral at bedtime  buDESOnide    Inhalation Suspension 0.5 milliGRAM(s) Inhalation every 12 hours  dextrose 5%. 1000 milliLiter(s) IV Continuous <Continuous>  dextrose 50% Injectable 25 Gram(s) IV Push once  dextrose 50% Injectable 12.5 Gram(s) IV Push once  dextrose 50% Injectable 25 Gram(s) IV Push once  dextrose Oral Gel 15 Gram(s) Oral once  enoxaparin Injectable 40 milliGRAM(s) SubCutaneous every 24 hours  glucagon  Injectable 1 milliGRAM(s) IntraMuscular once  guaifenesin/dextromethorphan Oral Liquid 5 milliLiter(s) Oral every 6 hours PRN  magnesium sulfate  IVPB 1 Gram(s) IV Intermittent once  pantoprazole  Injectable 40 milliGRAM(s) IV Push two times a day  predniSONE   Tablet 20 milliGRAM(s) Oral daily      ROS: 14 point ROS negative unless otherwise state in subjective    PHYSICAL EXAM:   Vital Signs:  Vital Signs Last 24 Hrs  T(C): 36.4 (13 Jul 2022 08:25), Max: 36.4 (12 Jul 2022 23:55)  T(F): 97.6 (13 Jul 2022 08:25), Max: 97.6 (13 Jul 2022 08:25)  HR: 92 (13 Jul 2022 08:25) (84 - 99)  BP: 141/76 (13 Jul 2022 08:25) (113/72 - 141/76)  BP(mean): --  RR: 20 (13 Jul 2022 08:25) (16 - 20)  SpO2: 95% (13 Jul 2022 08:25) (90% - 96%)    Parameters below as of 13 Jul 2022 08:43  Patient On (Oxygen Delivery Method): nasal cannula      Daily     Daily     GENERAL:  No acute distress, has nasal cannula in place.   ABDOMEN:  Soft, non-tender, non-distended, normoactive bowel sounds, no masses  EXTREMITIES:  No peripheral edema b/l  NEURO:  Answers questions appropriately and follows commands    LABS:                        15.0   14.55 )-----------( 246      ( 12 Jul 2022 20:41 )             45.4     Mean Cell Volume: 94.6 fl (07-12-22 @ 20:41)    07-13    133<L>  |  88<L>  |  13  ----------------------------<  85  4.9   |  34<H>  |  0.72    Ca    9.4      13 Jul 2022 07:17  Phos  2.8     07-13  Mg     1.9     07-13               Gastroenterology/Hepatology Progress Note      Interval Events:     No acute events overnight. Patient reported he saw speech pathology this morning, swallowed liquids and did well. He denied dysphagia, odynophagia, abdominal pain, nausea or vomiting.     Allergies:  No Known Allergies      Hospital Medications:  ALBUTerol    90 MICROgram(s) HFA Inhaler 2 Puff(s) Inhalation every 6 hours  albuterol/ipratropium for Nebulization 3 milliLiter(s) Nebulizer every 6 hours  albuterol/ipratropium for Nebulization 3 milliLiter(s) Nebulizer every 6 hours PRN  albuterol/ipratropium for Nebulization. 3 milliLiter(s) Nebulizer once  amLODIPine   Tablet 10 milliGRAM(s) Oral daily  aspirin  chewable 81 milliGRAM(s) Oral daily  atorvastatin 40 milliGRAM(s) Oral at bedtime  buDESOnide    Inhalation Suspension 0.5 milliGRAM(s) Inhalation every 12 hours  dextrose 5%. 1000 milliLiter(s) IV Continuous <Continuous>  dextrose 50% Injectable 25 Gram(s) IV Push once  dextrose 50% Injectable 12.5 Gram(s) IV Push once  dextrose 50% Injectable 25 Gram(s) IV Push once  dextrose Oral Gel 15 Gram(s) Oral once  enoxaparin Injectable 40 milliGRAM(s) SubCutaneous every 24 hours  glucagon  Injectable 1 milliGRAM(s) IntraMuscular once  guaifenesin/dextromethorphan Oral Liquid 5 milliLiter(s) Oral every 6 hours PRN  magnesium sulfate  IVPB 1 Gram(s) IV Intermittent once  pantoprazole  Injectable 40 milliGRAM(s) IV Push two times a day  predniSONE   Tablet 20 milliGRAM(s) Oral daily      ROS: 14 point ROS negative unless otherwise state in subjective    PHYSICAL EXAM:   Vital Signs:  Vital Signs Last 24 Hrs  T(C): 36.4 (13 Jul 2022 08:25), Max: 36.4 (12 Jul 2022 23:55)  T(F): 97.6 (13 Jul 2022 08:25), Max: 97.6 (13 Jul 2022 08:25)  HR: 92 (13 Jul 2022 08:25) (84 - 99)  BP: 141/76 (13 Jul 2022 08:25) (113/72 - 141/76)  BP(mean): --  RR: 20 (13 Jul 2022 08:25) (16 - 20)  SpO2: 95% (13 Jul 2022 08:25) (90% - 96%)    Parameters below as of 13 Jul 2022 08:43  Patient On (Oxygen Delivery Method): nasal cannula      Daily     Daily     GENERAL:  No acute distress, has nasal cannula in place.   ABDOMEN:  Soft, non-tender, non-distended, normoactive bowel sounds, no masses  EXTREMITIES:  No peripheral edema b/l  NEURO:  Answers questions appropriately and follows commands    LABS:                        15.0   14.55 )-----------( 246      ( 12 Jul 2022 20:41 )             45.4     Mean Cell Volume: 94.6 fl (07-12-22 @ 20:41)    07-13    133<L>  |  88<L>  |  13  ----------------------------<  85  4.9   |  34<H>  |  0.72    Ca    9.4      13 Jul 2022 07:17  Phos  2.8     07-13  Mg     1.9     07-13

## 2022-07-13 NOTE — PROGRESS NOTE ADULT - NSPROGADDITIONALINFOA_GEN_ALL_CORE
.  Erinn Tomlinson MD  Division of Hospital Medicine  [facility]   Available on Microsoft Teams - messages preferred prior to calls.    Plan discussed with patient, granddaughter Liz via phone on 7/11, and medicine NP Bonita.  Attempted to call granddaughter twice yesterday and twice today with update but no answer - per patient, she has poor cell reception at work so might be why unable to answer?  will try again later if able .  Erinn Tomlinson MD  Division of Hospital Medicine  Health system   Available on [employer] Teams - messages preferred prior to calls.    Plan discussed with patient, granddaughter Liz via phone, and medicine NP Cata.

## 2022-07-13 NOTE — SWALLOW VFSS/MBS ASSESSMENT ADULT - COMMENTS
7/9: GI called to evaluate pt this evening after pt had chicken for lunch and felt dysphagia afterwards. ENT was called for evaluation after sensation of chicken stuck in throat. ENT performed indirect laryngoscopy which was clear for any foreign body pt. Pt currently tolerating secretions and PO liquids w/o difficulty. Reports he has never had an EGD before. Differential includes oropharyngeal phase (neurological, muscular weakness) vs. esophageal phase (ring vs. EoE vs. maglinancy) GI recs: - clear liquids for now, keep NPO at midnight, recommend formal SLP eval including MBS given reported coughing after eating at home, aspiration precautions, can consider non-emergent EGD 7/11 if pt amenable  7/10: Underwent emergent EGD 7/10 showing - Food in the distal esophagus s/p endoscopic evacuation of food into stomach; this was likely the cause of pt's dysphagia; examination was suspicious for achalasia; esophagitis- biopsied to rule out eosinophilic esophagitis; erythematous mucosa in the stomach; normal duodenal bulb and second portion of the duodenum.  Recs:  - Clear liquid diet; please do not advance diet without letting GI know  - Await pathology results.   - Consider inpatient MBS + barium esophagram this admission.    Swallow history: Pt known to this service from this admission. Seen on July 11th for swallow evaluation at bedside. Defer to evaluation note for details. MBS requested by GI in preparation for EGD.

## 2022-07-13 NOTE — PROGRESS NOTE ADULT - PROBLEM SELECTOR PLAN 7
DVT ppx: lovenox  Code Status: Full Code    Dispo: home PT DVT ppx: lovenox  Code Status: Full Code  HCP: Granddaughter Liz Chaves [phone]  Dispo: home PT

## 2022-07-13 NOTE — PROGRESS NOTE ADULT - SUBJECTIVE AND OBJECTIVE BOX
Erinn Tomlinson MD  Division of Hospital Medicine  [facility]   Available on Microsoft Teams (Mon-Fri 8am-5pm)    * messages preferred prior to calls  Other Times:  [phone]      Patient is a 87y old  Male who presents with a chief complaint of COPD exacerbation (13 Jul 2022 09:18)      SUBJECTIVE / OVERNIGHT EVENTS: no acute events overnight. transient increased SOB yesterday that improved with extra duoneb treatment. feels much improved today. denies any sob nor cough. wondering when MBS will occur.   ADDITIONAL REVIEW OF SYSTEMS:    MEDICATIONS  (STANDING):  ALBUTerol    90 MICROgram(s) HFA Inhaler 2 Puff(s) Inhalation every 6 hours  albuterol/ipratropium for Nebulization 3 milliLiter(s) Nebulizer every 6 hours  albuterol/ipratropium for Nebulization. 3 milliLiter(s) Nebulizer once  amLODIPine   Tablet 10 milliGRAM(s) Oral daily  aspirin  chewable 81 milliGRAM(s) Oral daily  atorvastatin 40 milliGRAM(s) Oral at bedtime  buDESOnide    Inhalation Suspension 0.5 milliGRAM(s) Inhalation every 12 hours  dextrose 5%. 1000 milliLiter(s) (40 mL/Hr) IV Continuous <Continuous>  dextrose 50% Injectable 25 Gram(s) IV Push once  dextrose 50% Injectable 12.5 Gram(s) IV Push once  dextrose 50% Injectable 25 Gram(s) IV Push once  dextrose Oral Gel 15 Gram(s) Oral once  enoxaparin Injectable 40 milliGRAM(s) SubCutaneous every 24 hours  glucagon  Injectable 1 milliGRAM(s) IntraMuscular once  pantoprazole  Injectable 40 milliGRAM(s) IV Push two times a day  predniSONE   Tablet 20 milliGRAM(s) Oral daily    MEDICATIONS  (PRN):  albuterol/ipratropium for Nebulization 3 milliLiter(s) Nebulizer every 6 hours PRN Shortness of Breath and/or Wheezing  guaifenesin/dextromethorphan Oral Liquid 5 milliLiter(s) Oral every 6 hours PRN Persistent coughing      CAPILLARY BLOOD GLUCOSE      POCT Blood Glucose.: 116 mg/dL (13 Jul 2022 12:16)  POCT Blood Glucose.: 97 mg/dL (13 Jul 2022 08:11)  POCT Blood Glucose.: 113 mg/dL (12 Jul 2022 21:17)  POCT Blood Glucose.: 129 mg/dL (12 Jul 2022 17:00)    I&O's Summary    12 Jul 2022 07:01  -  13 Jul 2022 07:00  --------------------------------------------------------  IN: 1500 mL / OUT: 2120 mL / NET: -620 mL    13 Jul 2022 07:01  -  13 Jul 2022 14:03  --------------------------------------------------------  IN: 300 mL / OUT: 700 mL / NET: -400 mL        PHYSICAL EXAM:  Vital Signs Last 24 Hrs  T(C): 36.4 (13 Jul 2022 08:25), Max: 36.4 (12 Jul 2022 23:55)  T(F): 97.6 (13 Jul 2022 08:25), Max: 97.6 (13 Jul 2022 08:25)  HR: 92 (13 Jul 2022 08:25) (84 - 99)  BP: 141/76 (13 Jul 2022 08:25) (113/72 - 141/76)  BP(mean): --  RR: 20 (13 Jul 2022 08:25) (16 - 20)  SpO2: 91% (13 Jul 2022 10:07) (90% - 96%)    Parameters below as of 13 Jul 2022 10:07  Patient On (Oxygen Delivery Method): room air      CONSTITUTIONAL: NAD, well-developed, well-groomed  EYES: PERRLA; conjunctiva and sclera clear  ENMT: Moist oral mucosa, no pharyngeal injection or exudates; normal dentition  NECK: Supple, no palpable masses; no thyromegaly  RESPIRATORY: Normal respiratory effort; lungs are clear to auscultation bilaterally, no wheeze nor crackles on exam; no accessory muscle use  CARDIOVASCULAR: Regular rate and rhythm, normal S1 and S2, no murmur/rub/gallop; No lower extremity edema; Peripheral pulses are 2+ bilaterally  ABDOMEN: Soft, Nondistended, Nontender to palpation, normoactive bowel sounds  MUSCULOSKELETAL:  No clubbing or cyanosis of digits; no joint swelling or tenderness to palpation  PSYCH: A+O to person, place, and time; affect appropriate  NEUROLOGY: CN 2-12 are intact and symmetric; no gross sensory deficits   SKIN: No rashes; no palpable lesions    LABS:                        15.0   14.55 )-----------( 246      ( 12 Jul 2022 20:41 )             45.4     07-13    133<L>  |  88<L>  |  13  ----------------------------<  85  4.9   |  34<H>  |  0.72    Ca    9.4      13 Jul 2022 07:17  Phos  2.8     07-13  Mg     1.9     07-13          RADIOLOGY & ADDITIONAL TESTS:  Results Reviewed:   Imaging Personally Reviewed:  7/12/22 CXR with clear lungs  Electrocardiogram Personally Reviewed:    COORDINATION OF CARE:  Care Discussed with Consultants/Other Providers [Y]: medicine AFSHAN Ivy  Prior or Outpatient Records Reviewed [Y/N]:

## 2022-07-13 NOTE — SWALLOW VFSS/MBS ASSESSMENT ADULT - PHARYNGEAL PHASE COMMENTS
Pharyngeal phase c/b latency in the swallow trigger to the level of the valleculae with spillover to the level of the pyriform sinuses most often w/ less viscous textures. Base of tongue/BoT retraction and pharyngeal constriction were mildly reduced and resulted in mildly reduced pharyngeal bolus propulsion. Additionally, presence of cricopharyngeal bar appearing to intermittently negatively impact bolus propulsion as appeared to interfere w/ epiglottic retroflex resulting in epiglottis inconsistently abutting the posterior pharyngeal wall vs inversion. Mild-to-trace vallecular and pyriform sinus residue present, which partially to fully clearing post dry swallow.  Hyolaryngeal elevation and excursion were functional. Adequate relaxation of UES.   To add, as viscosity increased the amount of residue increased, dry swallow and/or liquid wash w/ thin liquids was beneficial in clearance.   Shallow penetration noted w/ all consistencies, which intermittently noted residue on the laryngeal surface of the epiglottis which eventually cleared w/ dry swallow response; fully retrieved. Patient independently /volitionally completed dry swallow response. Noted pt being sensate to penetrated materials c/b 'throat clearing'. No aspiration identified w/ exhaustive trials of single and serial sip presentations.

## 2022-07-13 NOTE — SWALLOW VFSS/MBS ASSESSMENT ADULT - SLP PERTINENT HISTORY OF CURRENT PROBLEM
KELSIE TRUJILLO (Rudy) is an 87M PMH COPD (not on home O2), HTN, HLD, presented to the ED due to COPD exacerbation. Overnight pt showed increased work of breathing, and despite using albuterol symptoms cannot be controlled. He was brought in by his grandson. Despite putting pt on BiPAP and AVASP pt was still having increased work of breathing and was therefore intubated after being paralyzed. VBG showed worsening acidosis. CXR clear. Limited ECHO benign. COVID and RVP negative. Pt was intubated on 7/7 for acute respiratory failure caused by COPD exacerbation. In the MICU, pt was started on methyprednisone 40 mg, pulmicort 0.5 mg, duonebs and azithromycin 500 mg. Pt's ABG and mentation improved and pt successfully tolerated extubation on 7/8 and transferred to the floors.

## 2022-07-13 NOTE — PROGRESS NOTE ADULT - ASSESSMENT
86 yo male with PMH COPD (not on home O2), HTN, and HLD who presents with acute hypercarbic respiratory failure 2/2 COPD exacerbation with course c/b dysphagia and food impaction

## 2022-07-13 NOTE — CHART NOTE - NSCHARTNOTEFT_GEN_A_CORE
As per GI, continuation of clear liquid diet. thin liquids and puree solids initially w/ advancement of solid textures at bedside when cleared from a GI standpoint. d/w Dr Myers and will change diet to pureed .

## 2022-07-13 NOTE — SWALLOW VFSS/MBS ASSESSMENT ADULT - RECOMMENDED CONSISTENCY
As per GI, continuation of clear liquid diet.     --Pending completion of EGD, would recommend thin liquids and puree solids initially w/ advancement of solid textures at bedside when cleared from a GI standpoint.

## 2022-07-13 NOTE — PROGRESS NOTE ADULT - ASSESSMENT
· Assessment	  88 yo M with h/o COPD, HTN, HLD, p/w acute hypercarbic respiratory failure due to COPD exacerbation. GI called to evaluate pt this evening (7/9) after pt had chicken for lunch and felt dysphagia afterwards. ENT was called for evaluation after sensation of chicken stuck in throat. ENT performed indirect laryngoscopy which was clear for any foreign body pt. On 7/10 after eating breakfast was not tolerating secretions. Underwent emergent EGD 7/10 showing - Food in the distal esophagus s/p endoscopic evacuation of food into stomach; this was likely the cause of pt's dysphagia; examination was suspicious for achalasia; biopsy ruled out eosinophilic esophagitis; erythematous mucosa in the stomach; normal duodenal bulb and second portion of the duodenum.     Impression:  #Dysphagia secondary to food impaction in the distal esophagus s/p EGD evacuation of food into the stomach  Patient has a history of chronic aspiration with frequent coughing and choking episodes. Differential for chronic dysphagia/aspiration includes oropharyngeal phase (neurological, muscular weakness) with possible underlying achalasia. Underwent emergent EGD 7/10 showing - Food in the distal esophagus s/p endoscopic evacuation of food into stomach; this was likely the cause of pt's dysphagia; examination was suspicious for achalasia; esophagitis- biopsy showed no EoE . Patient would benefit from MBS + Barium esophagram which is still pending.     Recommendations:   - pending MBS + barium esophagram  - Aspiration precautions, sit upright during and after eating/drinking    GI will continue to follow.     All recommendations are tentative until note is attested by an attending.     Sisi Myers, PGY-4  Gastroenterology/Hepatology Fellow  Available on Microsoft Teams  [phone] (Short Range Pager)  [phone] (Long Range Pager)    After 5pm, please contact the on-call GI fellow. [phone] · Assessment	  88 yo M with h/o COPD, HTN, HLD, p/w acute hypercarbic respiratory failure due to COPD exacerbation. GI called to evaluate pt this evening (7/9) after pt had chicken for lunch and felt dysphagia afterwards. ENT was called for evaluation after sensation of chicken stuck in throat. ENT performed indirect laryngoscopy which was clear for any foreign body pt. On 7/10 after eating breakfast was not tolerating secretions. Underwent emergent EGD 7/10 showing - Food in the distal esophagus s/p endoscopic evacuation of food into stomach; this was likely the cause of pt's dysphagia; examination was suspicious for achalasia; biopsy ruled out eosinophilic esophagitis; erythematous mucosa in the stomach; normal duodenal bulb and second portion of the duodenum.     Impression:  #Dysphagia secondary to food impaction in the distal esophagus s/p EGD evacuation of food into the stomach  Patient has a history of chronic aspiration with frequent coughing and choking episodes. Differential for chronic dysphagia/aspiration includes oropharyngeal phase (neurological, muscular weakness) with possible underlying achalasia. Underwent emergent EGD 7/10 showing - Food in the distal esophagus s/p endoscopic evacuation of food into stomach; this was likely the cause of pt's dysphagia; examination was suspicious for achalasia; esophagitis- biopsy showed no EoE . MBS completed, recommended pureed diet with thin liquids. Patient will benefit from barium esophagram which can be completed either as inpatient or outpatient.     Recommendations:   - pending barium esophagram  - continue with soft diet. Avoid solid food as he is high risk for aspiration.   - Aspiration precautions, sit upright during and after eating/drinking  - if patient would to leave home, can be discharged on soft diet and complete the barium esophagram as o/p.      GI will continue to follow.     All recommendations are tentative until note is attested by an attending.     Sisi Myers, PGY-4  Gastroenterology/Hepatology Fellow  Available on Microsoft Teams  [phone] (Short Range Pager)  [phone] (Long Range Pager)    After 5pm, please contact the on-call GI fellow. [phone]

## 2022-07-13 NOTE — PROGRESS NOTE ADULT - SUBJECTIVE AND OBJECTIVE BOX
[facility] - PROGRESS NOTE    CHIEF COMPLAINT: COPD exacerbation; food impaction;     INTERVAL HISTORY: weak and frail; did well on the modified barium swallow testing but remains on a puree diet with clear liquids after a bout of food impaction in the distal esophagus on 7/10 requiring emergent EGD to "push" the food into the stomach; no shortness of breath or hypoxemia on a 2lpm nasal canula; minimal cough with scant sputum production; resolved chest congestion and wheeze; no fevers, chills or sweats; no chest pain/pressure or palpitations;    REVIEW OF SYSTEMS:  Constitutional: As per interval history  HEENT: Within normal limits  CV: As per interval history  Resp: As per interval history  GI: esophageal dysphagia  : Within normal limits  Musculoskeletal: Within normal limits  Skin: Within normal limits  Neurological: Within normal limits  Psychiatric: Within normal limits  Endocrine: Within normal limits  Hematologic/Lymphatic: Within normal limits  Allergic/Immunologic: Within normal limits    MEDICATIONS:     Pulmonary "  ALBUTerol    90 MICROgram(s) HFA Inhaler 2 Puff(s) Inhalation every 6 hours  albuterol/ipratropium for Nebulization 3 milliLiter(s) Nebulizer every 6 hours  albuterol/ipratropium for Nebulization. 3 milliLiter(s) Nebulizer once  buDESOnide    Inhalation Suspension 0.5 milliGRAM(s) Inhalation every 12 hours    Anti-microbials:    Cardiovascular:  amLODIPine   Tablet 10 milliGRAM(s) Oral daily    Other:  aspirin  chewable 81 milliGRAM(s) Oral daily  atorvastatin 40 milliGRAM(s) Oral at bedtime  dextrose 5%. 1000 milliLiter(s) IV Continuous <Continuous>  dextrose 50% Injectable 25 Gram(s) IV Push once  dextrose 50% Injectable 12.5 Gram(s) IV Push once  dextrose 50% Injectable 25 Gram(s) IV Push once  dextrose Oral Gel 15 Gram(s) Oral once  enoxaparin Injectable 40 milliGRAM(s) SubCutaneous every 24 hours  glucagon  Injectable 1 milliGRAM(s) IntraMuscular once  pantoprazole  Injectable 40 milliGRAM(s) IV Push two times a day  predniSONE   Tablet 20 milliGRAM(s) Oral daily    MEDICATIONS  (PRN):  albuterol/ipratropium for Nebulization 3 milliLiter(s) Nebulizer every 6 hours PRN Shortness of Breath and/or Wheezing  guaifenesin/dextromethorphan Oral Liquid 5 milliLiter(s) Oral every 6 hours PRN Persistent coughing        OBJECTIVE:    Daily Weight in k (2022 11:00)    POCT Blood Glucose.: 122 mg/dL (2022 16:45)  POCT Blood Glucose.: 116 mg/dL (2022 12:16)  POCT Blood Glucose.: 97 mg/dL (2022 08:11)  POCT Blood Glucose.: 113 mg/dL (2022 21:17)      PHYSICAL EXAM:       ICU Vital Signs Last 24 Hrs  T(C): 36.8 (2022 16:14), Max: 36.8 (2022 16:14)  T(F): 98.2 (2022 16:14), Max: 98.2 (2022 16:14)  HR: 87 (2022 16:14) (84 - 99)  BP: 124/71 (2022 16:14) (113/72 - 141/76)  BP(mean): --  ABP: --  ABP(mean): --  RR: 18 (2022 16:14) (18 - 20)  SpO2: 96% (2022 16:14) (91% - 96%) on 2lpm nasal canula      General: Awake. Alert. Cooperative. No distress. Appears stated age. Weak. Frail. Asthenic	  HEENT: Atraumatic. Bitemporal wasting. Anicteric. Normal oral mucosa. PERRL. EOMI.  Neck: Supple. Trachea midline. Thyroid without enlargement/tenderness/nodules. No carotid bruit. No JVD. Loss of bilateral supraclavicular fat pads.	  Cardiovascular: Regular rate and rhythm. S1 S2 normal. No murmurs, rubs or gallops.  Respiratory: Respirations unlabored. Decreased breath sounds throughout. No rales. No wheeze. Kyphosis.  Abdomen: Soft. Non-tender. Non-distended. No organomegaly. No masses. Normal bowel sounds.  Extremities: Warm to touch. No clubbing or cyanosis. No pedal edema.  Pulses: 2+ peripheral pulses all extremities.	  Skin: Normal skin color. No rashes or lesions. No ecchymoses. No cyanosis. Warm to touch.  Lymph Nodes: Cervical, supraclavicular and axillary nodes normal  Neurological: Motor and sensory examination equal and normal. A and O x 3  Psychiatry: Appropriate mood and affect.    LABS:                          15.0   14.55 )-----------( 246      ( 2022 20:41 )             45.4     CBC    WBC  14.55 <==, 7.73 <==, 9.15 <==, 10.64 <==, 12.72 <==, 6.65 <==, 9.08 <==    Hemoglobin  15.0 <<==, 14.7 <<==, 12.8 <<==, 13.2 <<==, 12.6 <<==, 13.7 <<==, 15.3 <<==    Hematocrit  45.4 <==, 45.0 <==, 39.7 <==, 40.1 <==, 36.1 <==, 40.4 <==, 45.5 <==    Platelets  246 <==, 218 <==, 170 <==, 166 <==, 148 <==, 168 <==, 195 <==      133<L>  |  88<L>  |  13  ----------------------------<  85    07-13  4.9   |  34<H>  |  0.72      LYTES    sodium  133 <==, 137 <==, 137 <==, 140 <==, 132 <==, 136 <==, 133 <==    potassium   4.9 <==, 4.6 <==, 4.5 <==, 5.0 <==, 4.4 <==, 4.6 <==, 4.4 <==    chloride  88 <==, 92 <==, 95 <==, 99 <==, 96 <==, 95 <==, 93 <==    carbon dioxide  34 <==, 36 <==, 35 <==, 34 <==, 26 <==, 28 <==, 31 <==    =============================================================================================  RENAL FUNCTION:    Creatinine:   0.72  <<==, 0.68  <<==, 0.76  <<==, 0.73  <<==, 0.68  <<==, 0.73  <<==, 0.78  <<==    BUN:   13 <==, 12 <==, 20 <==, 21 <==, 21 <==, 21 <==, 20 <==    ============================================================================================    calcium   9.4 <==, 9.7 <==, 8.9 <==, 9.1 <==, 9.1 <==, 9.2 <==, 9.6 <==    phos   2.8 <==, 2.2 <==, 3.7 <==, 3.1 <==, 3.7 <==    mag   1.9 <==, 1.6 <==, 1.8 <==, 1.7 <==, 1.5 <==    ============================================================================================  LFTs    AST:   29 <== , 27 <== , 25 <== , 29 <==     ALT:  17  <== , 16  <== , 18  <== , 17  <==     AP:  49  <=, 47  <=, 55  <=, 64  <=    Bili:  0.5  <=, 1.0  <=, 1.1  <=, 0.7  <=    PT/INR - ( 2022 07:14 )   PT: 12.6 sec;   INR: 1.09 ratio      Blood Gas Profile - Arterial (22 @ 00:30)   pH, Arterial: 7.51   pCO2, Arterial: 40 mmHg   pO2, Arterial: 139 mmHg   HCO3, Arterial: 32 mmol/L   Base Excess, Arterial: 8.2 mmol/L   Oxygen Saturation, Arterial: 99.6 %   Total CO2, Arterial: 33 mmol/L   FIO2, Arterial: 30.0   Blood Gas Source Arterial: Arterial   ---------------------------------------------------------------------------------------------------------------  Blood Gas Profile - Arterial (22 @ 13:12)   pH, Arterial: 7.38   pCO2, Arterial: 52 mmHg   pO2, Arterial: 234 mmHg   HCO3, Arterial: 31 mmol/L   Base Excess, Arterial: 4.3 mmol/L   Oxygen Saturation, Arterial: 100.0 %   Total CO2, Arterial: 32 mmol/L   FIO2, Arterial: 50   Blood Gas Source Arterial: Arterial     Blood Gas Profile - Venous (22 @ 08:10)   pH, Venous: 7.18   pCO2, Venous: 108 mmHg   pO2, Venous: 55 mmHg   HCO3, Venous: 40 mmol/L   Base Excess, Venous: 8.2 mmol/L   Oxygen Saturation, Venous: 81.2 %   Total CO2, Venous: 44 mmol/L   Blood Gas Source Venous: Venous       MICROBIOLOGY:     Respiratory Viral Panel with COVID-19 by NICKIE (22 @ 07:02)   Rapid RVP Result: [facility]   SARS-CoV-2: [facility]  This Respiratory Panel uses polymerase chain reaction (PCR) to detect for   adenovirus; coronavirus (HKU1, NL63, 229E, OC43); human metapneumovirus   (hMPV); human enterovirus/rhinovirus (Entero/RV); influenza A; influenza   A/H1; influenza A/H3; influenza A/H1-2009; influenza B; parainfluenza   viruses 1, 2, 3, 4; respiratory syncytial virus; Mycoplasma pneumoniae;   Chlamydophila pneumoniae; and SARS-CoV-2.     Culture - Sputum . (22 @ 07:05)   Culture Results:   Normal Respiratory Maggi present     Culture - Blood (22 @ 13:27)   Specimen Source: .Blood Blood   Culture Results:   No Growth Final     Culture - Blood (22 @ 13:23)   Specimen Source: .Blood Blood   Culture Results:   No Growth Final     RADIOLOGY:  [x] Chest radiographs reviewed and interpreted by me    EXAM:  XR CHEST PORTABLE URGENT 1V                          PROCEDURE DATE:  2022      FINDINGS:    The lungs are hyperaerated clear.    Heart size is within normal limits.    No acute osseous findings.      IMPRESSION:    Clear lungs.    ALEXANDRIA LANGE MD; Resident Radiology  This document has been electronically signed.  GIA VAZQUEZ MD; Attending Radiologist  This document has been electronically signed. 2022  9:42PM  ---------------------------------------------------------------------------------------------------------------  EXAM:  XR CHEST PORTABLE URGENT 1V                          PROCEDURE DATE:  2022        FINDINGS:    Interval placement of endotracheal tube which terminates in the mid   trachea.  Cardiac size is within normal limits.  The lungs are hyperaerated, but clear.  There are no pleural effusions. No pneumothorax.  Degenerative osseous changes.    IMPRESSION:  Endotracheal tube terminates mid trachea.    VEENA DESAI MD; Resident Radiology  This document has been electronically signed.  GIA VAZQUEZ MD; Attending Radiologist  This documenthas been electronically signed. 2022 12:37PM  ---------------------------------------------------------------------------------------------------------------  EXAM:  XR CHEST PORTABLE URGENT 1V                          PROCEDURE DATE:  2022      FINDINGS:    The lungs are hyperaerated, but clear.  No pleural effusion or pneumothorax.  Heart size is within normal limits.  No acute abnormality within visible osseous structures.      IMPRESSION:    Clear lungs.    NEHA DOUGLAS MD; Resident Radiology  This document has been electronically signed.  GIA VAZQUEZ MD; Attending Radiologist  This document has been electronically signed. 2022 11:46AM  ---------------------------------------------------------------------------------------------------------------  EXAM:  DUPLEX SCAN EXT VEINS LOWER BI                          PROCEDURE DATE:  2022      IMPRESSION:  No evidence of acute deep venous thrombosis in either lower extremity.  Peripheral echogenicity along the left gastrocnemius vein may be sequela   of old deep venous thrombosis.    JOEL RANDLE MD; Attending Radiologist  This document has been electronically signed. 2022  6:06PM  ---------------------------------------------------------------------------------------------------------------

## 2022-07-13 NOTE — PROGRESS NOTE ADULT - PROBLEM SELECTOR PLAN 1
overall improved s/p intubation and MICU monitoring   - RVP/covid neg, sputum cx neg, cxr clear   - s/p azithro  - steriod taper as per pulmonary, s/p prednisone 40mg daily on 7/12 tapered to 20mg daily to start 7/13  - c/w duonebs ATC, add PRN for SOB/wheeze  - c/w budesonide  - on Incruse ellipta at home - continue on discharge   - titrate down O2 as tolerated - currently on 2LNC (not on home O2)      * may need to evaluate for need for home O2 on discharge  - repeat CXR unchanged with no acute abnormalities  - Pulmonary following, recs appreciated

## 2022-07-13 NOTE — SWALLOW VFSS/MBS ASSESSMENT ADULT - ORAL PHASE COMMENTS
Oral phase c/b adequate orientation/reception. Reduced control of bolus leading to premature spillover in the oropharynx. Delayed oral transit time w/ more viscous textures.

## 2022-07-13 NOTE — SWALLOW VFSS/MBS ASSESSMENT ADULT - ADDITIONAL INFORMATION
+C6-7 osteophytes  +C5-7 cervical calcification, would defer to radiologist w/ regards to possible bridging pattern   +C5-6 cricopharyngeal bar, minimal however appearing to at times negatively impact swallow function as relates to epiglottic inversion

## 2022-07-13 NOTE — PROGRESS NOTE ADULT - ASSESSMENT
ASSESSMENT:    COPD exacerbation requiring a brief intubation perhaps in the setting of aspiration    Dysphagia s/p EGD for food impaction in the distal esophagus    HTN/HLD    PLAN/RECOMMENDATIONS:    oxygen supplementation to keep saturation greater than 92%  continue prednisone 20mg daily  albuterol/atrovent nebs q6h  pulmicort 0.5mg nebs q12h - give after duoneb - rinse mouth after use  encouraging secretion clearance, pulmonary toilet, etc  GI evaluation ongoing    "rule out" achalasia -> awaiting esophagram  aspiration precautions    head of bed elevation at 90 degrees when eating    puree diet    small bites - no straws - chin tuck maneuver  cardiac meds: ASA/lipitor/norvasc  GI/DVT prophylaxis - protonix/SQ lovenox  out of bed and into the chair  ambulate as able    Will follow with you. Plan of care discussed with the patient at bedside and with the dedicated floor MLP    Aditya Simon MD, [facility]  [phone]  Pulmonary Medicine

## 2022-07-13 NOTE — PROGRESS NOTE ADULT - CONVERSATION DETAILS
We discussed code status in detail at bedside. Patient states he is "not ready to go" and would like everything done, including CPR and intubation if needed. His wife recently passed away and he has a son in [place] and 2 grandchildren he lives with currently. We discussed designating a HCP and he believes he would like to designate his granddaughter Liz with whom he lives since his son is far away in [place], but he would like to first discuss this with his son and granddaughter before designating a HCP.
Patient designates his granddaughter Liz Chaves [phone] as his HCP. Confirmed this with his granddaughter via phone as well. She states she believes HCP was already completed as a previous provider earlier this admission had this discussion with them before. Will confirm and check in the chart tomorrow.

## 2022-07-13 NOTE — SWALLOW VFSS/MBS ASSESSMENT ADULT - ORAL PREP COMMENTS
Adequate ability to strip the bolus from the tsp and complete labia seal for cup drinking. No anterior loss present.

## 2022-07-14 ENCOUNTER — TRANSCRIPTION ENCOUNTER (OUTPATIENT)
Age: 87
End: 2022-07-14

## 2022-07-14 LAB
ANION GAP SERPL CALC-SCNC: 14 MMOL/L — SIGNIFICANT CHANGE UP (ref 5–17)
BUN SERPL-MCNC: 13 MG/DL — SIGNIFICANT CHANGE UP (ref 7–23)
CALCIUM SERPL-MCNC: 9.5 MG/DL — SIGNIFICANT CHANGE UP (ref 8.4–10.5)
CHLORIDE SERPL-SCNC: 90 MMOL/L — LOW (ref 96–108)
CO2 SERPL-SCNC: 34 MMOL/L — HIGH (ref 22–31)
CREAT SERPL-MCNC: 0.67 MG/DL — SIGNIFICANT CHANGE UP (ref 0.5–1.3)
EGFR: 91 ML/MIN/1.73M2 — SIGNIFICANT CHANGE UP
GLUCOSE BLDC GLUCOMTR-MCNC: 100 MG/DL — HIGH (ref 70–99)
GLUCOSE BLDC GLUCOMTR-MCNC: 115 MG/DL — HIGH (ref 70–99)
GLUCOSE BLDC GLUCOMTR-MCNC: 120 MG/DL — HIGH (ref 70–99)
GLUCOSE BLDC GLUCOMTR-MCNC: 122 MG/DL — HIGH (ref 70–99)
GLUCOSE BLDC GLUCOMTR-MCNC: 86 MG/DL — SIGNIFICANT CHANGE UP (ref 70–99)
GLUCOSE SERPL-MCNC: 76 MG/DL — SIGNIFICANT CHANGE UP (ref 70–99)
HCT VFR BLD CALC: 41.3 % — SIGNIFICANT CHANGE UP (ref 39–50)
HGB BLD-MCNC: 13.4 G/DL — SIGNIFICANT CHANGE UP (ref 13–17)
MAGNESIUM SERPL-MCNC: 1.8 MG/DL — SIGNIFICANT CHANGE UP (ref 1.6–2.6)
MCHC RBC-ENTMCNC: 31.2 PG — SIGNIFICANT CHANGE UP (ref 27–34)
MCHC RBC-ENTMCNC: 32.4 GM/DL — SIGNIFICANT CHANGE UP (ref 32–36)
MCV RBC AUTO: 96.3 FL — SIGNIFICANT CHANGE UP (ref 80–100)
NRBC # BLD: 0 /100 WBCS — SIGNIFICANT CHANGE UP (ref 0–0)
PHOSPHATE SERPL-MCNC: 3.1 MG/DL — SIGNIFICANT CHANGE UP (ref 2.5–4.5)
PLATELET # BLD AUTO: 202 K/UL — SIGNIFICANT CHANGE UP (ref 150–400)
POTASSIUM SERPL-MCNC: 4.6 MMOL/L — SIGNIFICANT CHANGE UP (ref 3.5–5.3)
POTASSIUM SERPL-SCNC: 4.6 MMOL/L — SIGNIFICANT CHANGE UP (ref 3.5–5.3)
RBC # BLD: 4.29 M/UL — SIGNIFICANT CHANGE UP (ref 4.2–5.8)
RBC # FLD: 13.2 % — SIGNIFICANT CHANGE UP (ref 10.3–14.5)
SARS-COV-2 RNA SPEC QL NAA+PROBE: SIGNIFICANT CHANGE UP
SODIUM SERPL-SCNC: 138 MMOL/L — SIGNIFICANT CHANGE UP (ref 135–145)
WBC # BLD: 8.58 K/UL — SIGNIFICANT CHANGE UP (ref 3.8–10.5)
WBC # FLD AUTO: 8.58 K/UL — SIGNIFICANT CHANGE UP (ref 3.8–10.5)

## 2022-07-14 PROCEDURE — 74220 X-RAY XM ESOPHAGUS 1CNTRST: CPT | Mod: 26

## 2022-07-14 PROCEDURE — 99232 SBSQ HOSP IP/OBS MODERATE 35: CPT | Mod: GC

## 2022-07-14 PROCEDURE — 99233 SBSQ HOSP IP/OBS HIGH 50: CPT

## 2022-07-14 RX ORDER — MAGNESIUM SULFATE 500 MG/ML
2 VIAL (ML) INJECTION ONCE
Refills: 0 | Status: COMPLETED | OUTPATIENT
Start: 2022-07-14 | End: 2022-07-14

## 2022-07-14 RX ADMIN — Medication 3 MILLILITER(S): at 13:18

## 2022-07-14 RX ADMIN — Medication 3 MILLILITER(S): at 05:27

## 2022-07-14 RX ADMIN — ENOXAPARIN SODIUM 40 MILLIGRAM(S): 100 INJECTION SUBCUTANEOUS at 15:53

## 2022-07-14 RX ADMIN — Medication 0.5 MILLIGRAM(S): at 18:34

## 2022-07-14 RX ADMIN — AMLODIPINE BESYLATE 10 MILLIGRAM(S): 2.5 TABLET ORAL at 05:27

## 2022-07-14 RX ADMIN — Medication 0.5 MILLIGRAM(S): at 05:28

## 2022-07-14 RX ADMIN — Medication 20 MILLIGRAM(S): at 05:27

## 2022-07-14 RX ADMIN — Medication 81 MILLIGRAM(S): at 12:33

## 2022-07-14 RX ADMIN — PANTOPRAZOLE SODIUM 40 MILLIGRAM(S): 20 TABLET, DELAYED RELEASE ORAL at 05:28

## 2022-07-14 RX ADMIN — Medication 25 GRAM(S): at 15:53

## 2022-07-14 RX ADMIN — PANTOPRAZOLE SODIUM 40 MILLIGRAM(S): 20 TABLET, DELAYED RELEASE ORAL at 18:34

## 2022-07-14 RX ADMIN — Medication 10 MILLIGRAM(S): at 12:33

## 2022-07-14 RX ADMIN — ATORVASTATIN CALCIUM 40 MILLIGRAM(S): 80 TABLET, FILM COATED ORAL at 21:07

## 2022-07-14 RX ADMIN — Medication 3 MILLILITER(S): at 18:34

## 2022-07-14 NOTE — PROGRESS NOTE ADULT - ASSESSMENT
86 yo male with PMH COPD (not on home O2), HTN, and HLD who presents with acute hypercarbic respiratory failure 2/2 COPD exacerbation with course c/b dysphagia and food impaction found to have abnormal esophageal motility on esophagram.

## 2022-07-14 NOTE — PROGRESS NOTE ADULT - PROBLEM SELECTOR PLAN 1
overall improved s/p intubation and MICU monitoring   - RVP/covid neg, sputum cx neg, CXR clear  - s/p azithro  - steroid taper as per pulmonary, tapered to prednisone 10mg x 3 days on 7/14  - c/w duonebs ATC, add PRN for SOB/wheeze  - c/w budesonide  - on Incruse ellipta at home - continue on discharge   - patient hypoxic to 84% on RA at rest. will need home oxygen on discharge which CM has set-up  - Pulmonary following, recs appreciated overall improved s/p intubation and MICU monitoring   - RVP/covid neg, sputum cx neg, CXR clear  - s/p azithro  - steroid taper as per pulmonary, tapered to prednisone 10mg x 3 days on 7/14  - c/w duonebs ATC, add PRN for SOB/wheeze  - c/w budesonide  - on Incruse ellipta at home - continue on discharge   - patient hypoxic to 84% on RA at rest. will need home oxygen on discharge which CM has set-up  - Pulmonary following, recs appreciated  - patient has follow-up appointment with Dr. Modi Monday 7/18/22 ?1pm (granddaughter unable to recall exact time)

## 2022-07-14 NOTE — DISCHARGE NOTE PROVIDER - DETAILS OF MALNUTRITION DIAGNOSIS/DIAGNOSES
This patient has been assessed with a concern for Malnutrition and was treated during this hospitalization for the following Nutrition diagnosis/diagnoses:     -  07/09/2022: Severe protein-calorie malnutrition   -  07/09/2022: Underweight (BMI < 19)

## 2022-07-14 NOTE — PROGRESS NOTE ADULT - SUBJECTIVE AND OBJECTIVE BOX
[facility] - PROGRESS NOTE    CHIEF COMPLAINT: COPD exacerbation; emphysema; esophageal dysphagia; cough; pulmonary cachexia; food impaction;     INTERVAL HISTORY: awake and alert; weak and frail; no shortness of breath or hypoxemia on a 2lpm nasal canula; no evidence of significant oropharyngeal dysphagia on swallowing evaluation; continues on a puree diet with thin liquids awaiting an esophagram to evaluate for possible achalasia following a bout of food impaction in the distal esophagus on 7/10 requiring emergent EGD to "push" the food into the stomach; minimal cough with scant sputum production; resolved chest congestion and wheeze; no fevers, chills or sweats; no chest pain/pressure or palpitations;    REVIEW OF SYSTEMS:  Constitutional: As per interval history  HEENT: Within normal limits  CV: As per interval history  Resp: As per interval history  GI: esophageal dysphagia  : Within normal limits  Musculoskeletal: Within normal limits  Skin: Within normal limits  Neurological: Within normal limits  Psychiatric: Within normal limits  Endocrine: Within normal limits  Hematologic/Lymphatic: Within normal limits  Allergic/Immunologic: Within normal limits    MEDICATIONS:     Pulmonary "  ALBUTerol    90 MICROgram(s) HFA Inhaler 2 Puff(s) Inhalation every 6 hours  albuterol/ipratropium for Nebulization 3 milliLiter(s) Nebulizer every 6 hours  albuterol/ipratropium for Nebulization. 3 milliLiter(s) Nebulizer once  buDESOnide    Inhalation Suspension 0.5 milliGRAM(s) Inhalation every 12 hours    Anti-microbials:    Cardiovascular:  amLODIPine   Tablet 10 milliGRAM(s) Oral daily    Other:  aspirin  chewable 81 milliGRAM(s) Oral daily  atorvastatin 40 milliGRAM(s) Oral at bedtime  dextrose 50% Injectable 25 Gram(s) IV Push once  dextrose 50% Injectable 12.5 Gram(s) IV Push once  dextrose 50% Injectable 25 Gram(s) IV Push once  dextrose Oral Gel 15 Gram(s) Oral once  enoxaparin Injectable 40 milliGRAM(s) SubCutaneous every 24 hours  glucagon  Injectable 1 milliGRAM(s) IntraMuscular once  pantoprazole  Injectable 40 milliGRAM(s) IV Push two times a day  predniSONE   Tablet 20 milliGRAM(s) Oral daily    MEDICATIONS  (PRN):  albuterol/ipratropium for Nebulization 3 milliLiter(s) Nebulizer every 6 hours PRN Shortness of Breath and/or Wheezing  guaifenesin/dextromethorphan Oral Liquid 5 milliLiter(s) Oral every 6 hours PRN Persistent coughing      OBJECTIVE:    Daily Weight in k (2022 11:00)    POCT Blood Glucose.: 100 mg/dL (2022 02:05)  POCT Blood Glucose.: 105 mg/dL (2022 21:12)  POCT Blood Glucose.: 122 mg/dL (2022 16:45)  POCT Blood Glucose.: 116 mg/dL (2022 12:16)  POCT Blood Glucose.: 97 mg/dL (2022 08:11)    PHYSICAL EXAM:       ICU Vital Signs Last 24 Hrs  T(C): 36.1 (2022 06:26), Max: 36.8 (2022 16:14)  T(F): 97 (2022 06:26), Max: 98.2 (2022 16:14)  HR: 81 (2022 06:26) (77 - 92)  BP: 130/68 (2022 06:26) (124/71 - 141/76)  BP(mean): --  ABP: --  ABP(mean): --  RR: 18 (2022 06:26) (18 - 20)  SpO2: 99% (2022 06:26) (91% - 99%) on 2lpm nasal canula    General: Awake. Alert. Cooperative. No distress. Appears stated age. Weak. Frail. Cachectic	  HEENT: Atraumatic. Bitemporal wasting. Anicteric. Normal oral mucosa. PERRL. EOMI.  Neck: Supple. Trachea midline. Thyroid without enlargement/tenderness/nodules. No carotid bruit. No JVD. Loss of bilateral supraclavicular fat pads.	  Cardiovascular: Regular rate and rhythm. S1 S2 normal. No murmurs, rubs or gallops.  Respiratory: Respirations unlabored. Decreased breath sounds throughout. No rales. No wheeze. Kyphosis.  Abdomen: Soft. Non-tender. Non-distended. No organomegaly. No masses. Normal bowel sounds.  Extremities: Warm to touch. No clubbing or cyanosis. No pedal edema.  Pulses: 2+ peripheral pulses all extremities.	  Skin: Normal skin color. No rashes or lesions. No ecchymoses. No cyanosis. Warm to touch.  Lymph Nodes: Cervical, supraclavicular and axillary nodes normal  Neurological: Motor and sensory examination equal and normal. A and O x 3  Psychiatry: Appropriate mood and affect.    LABS:                          15.0   14.55 )-----------( 246      ( 2022 20:41 )             45.4     CBC    WBC  14.55 <==, 7.73 <==, 9.15 <==, 10.64 <==, 12.72 <==, 6.65 <==    Hemoglobin  15.0 <<==, 14.7 <<==, 12.8 <<==, 13.2 <<==, 12.6 <<==, 13.7 <<==    Hematocrit  45.4 <==, 45.0 <==, 39.7 <==, 40.1 <==, 36.1 <==, 40.4 <==    Platelets  246 <==, 218 <==, 170 <==, 166 <==, 148 <==, 168 <==      133<L>  |  88<L>  |  13  ----------------------------<  85    07-13  4.9   |  34<H>  |  0.72      LYTES    sodium  133 <==, 137 <==, 137 <==, 140 <==, 132 <==, 136 <==    potassium   4.9 <==, 4.6 <==, 4.5 <==, 5.0 <==, 4.4 <==, 4.6 <==    chloride  88 <==, 92 <==, 95 <==, 99 <==, 96 <==, 95 <==    carbon dioxide  34 <==, 36 <==, 35 <==, 34 <==, 26 <==, 28 <==    =============================================================================================  RENAL FUNCTION:    Creatinine:   0.72  <<==, 0.68  <<==, 0.76  <<==, 0.73  <<==, 0.68  <<==, 0.73  <<==    BUN:   13 <==, 12 <==, 20 <==, 21 <==, 21 <==, 21 <==    ============================================================================================    calcium   9.4 <==, 9.7 <==, 8.9 <==, 9.1 <==, 9.1 <==, 9.2 <==    phos   2.8 <==, 2.2 <==, 3.7 <==, 3.1 <==, 3.7 <==    mag   1.9 <==, 1.6 <==, 1.8 <==, 1.7 <==, 1.5 <==    ============================================================================================  LFTs    AST:   29 <== , 27 <== , 25 <==     ALT:  17  <== , 16  <== , 18  <==     AP:  49  <=, 47  <=, 55  <=    Bili:  0.5  <=, 1.0  <=, 1.1  <=    Blood Gas Profile - Arterial (22 @ 00:30)   pH, Arterial: 7.51   pCO2, Arterial: 40 mmHg   pO2, Arterial: 139 mmHg   HCO3, Arterial: 32 mmol/L   Base Excess, Arterial: 8.2 mmol/L   Oxygen Saturation, Arterial: 99.6 %   Total CO2, Arterial: 33 mmol/L   FIO2, Arterial: 30.0   Blood Gas Source Arterial: Arterial   ---------------------------------------------------------------------------------------------------------------  Blood Gas Profile - Arterial (22 @ 13:12)   pH, Arterial: 7.38   pCO2, Arterial: 52 mmHg   pO2, Arterial: 234 mmHg   HCO3, Arterial: 31 mmol/L   Base Excess, Arterial: 4.3 mmol/L   Oxygen Saturation, Arterial: 100.0 %   Total CO2, Arterial: 32 mmol/L   FIO2, Arterial: 50   Blood Gas Source Arterial: Arterial     Blood Gas Profile - Venous (22 @ 08:10)   pH, Venous: 7.18   pCO2, Venous: 108 mmHg   pO2, Venous: 55 mmHg   HCO3, Venous: 40 mmol/L   Base Excess, Venous: 8.2 mmol/L   Oxygen Saturation, Venous: 81.2 %   Total CO2, Venous: 44 mmol/L   Blood Gas Source Venous: Venous       MICROBIOLOGY:     Respiratory Viral Panel with COVID-19 by NICKIE (22 @ 07:02)   [place] [facility]   [place] [facility]  This Respiratory Panel uses polymerase chain reaction (PCR) to detect for   adenovirus; coronavirus (HKU1, NL63, 229E, OC43); human metapneumovirus   (hMPV); human enterovirus/rhinovirus (Entero/RV); influenza A; influenza   A/H1; influenza A/H3; influenza A/H1-2009; influenza B; parainfluenza   viruses 1, 2, 3, 4; respiratory syncytial virus; Mycoplasma pneumoniae;   Chlamydophila pneumoniae; and SARS-CoV-2.     Culture - Sputum . (22 @ 07:05)   Culture Results:   Normal Respiratory Maggi present     Culture - Blood (22 @ 13:27)   Specimen Source: .Blood Blood   Culture Results:   No Growth Final     Culture - Blood (22 @ 13:23)   Specimen Source: .Blood Blood   Culture Results:   No Growth Final     RADIOLOGY:  [x] Chest radiographs reviewed and interpreted by me    EXAM:  XR CHEST PORTABLE URGENT 1V                          PROCEDURE DATE:  2022      FINDINGS:    The lungs are hyperaerated clear.    Heart size is within normal limits.    No acute osseous findings.      IMPRESSION:    Clear lungs.    ALEXANDRIA LANGE MD; Resident Radiology  This document has been electronically signed.  GIA VAZQUEZ MD; Attending Radiologist  This document has been electronically signed. 2022  9:42PM  ---------------------------------------------------------------------------------------------------------------  EXAM:  XR CHEST PORTABLE URGENT 1V                          PROCEDURE DATE:  2022        FINDINGS:    Interval placement of endotracheal tube which terminates in the mid   trachea.  Cardiac size is within normal limits.  The lungs are hyperaerated, but clear.  There are no pleural effusions. No pneumothorax.  Degenerative osseous changes.    IMPRESSION:  Endotracheal tube terminates mid trachea.    VEENA DESAI MD; Resident Radiology  This document has been electronically signed.  GIA VAZQUEZ MD; Attending Radiologist  This documenthas been electronically signed. 2022 12:37PM  ---------------------------------------------------------------------------------------------------------------  EXAM:  XR CHEST PORTABLE URGENT 1V                          PROCEDURE DATE:  2022      FINDINGS:    The lungs are hyperaerated, but clear.  No pleural effusion or pneumothorax.  Heart size is within normal limits.  No acute abnormality within visible osseous structures.      IMPRESSION:    Clear lungs.    NEHA DOUGLAS MD; Resident Radiology  This document has been electronically signed.  GIA VAZQUEZ MD; Attending Radiologist  This document has been electronically signed. 2022 11:46AM  ---------------------------------------------------------------------------------------------------------------  EXAM:  DUPLEX SCAN EXT VEINS LOWER BI                          PROCEDURE DATE:  2022      IMPRESSION:  No evidence of acute deep venous thrombosis in either lower extremity.  Peripheral echogenicity along the left gastrocnemius vein may be sequela   of old deep venous thrombosis.    JOEL RANDLE MD; Attending Radiologist  This document has been electronically signed. 2022  6:06PM  ---------------------------------------------------------------------------------------------------------------

## 2022-07-14 NOTE — PROGRESS NOTE ADULT - ASSESSMENT
· Assessment	  86 yo M with h/o COPD, HTN, HLD, p/w acute hypercarbic respiratory failure due to COPD exacerbation. GI called to evaluate pt this evening (7/9) after pt had chicken for lunch and felt dysphagia afterwards. ENT was called for evaluation after sensation of chicken stuck in throat. ENT performed indirect laryngoscopy which was clear for any foreign body pt. On 7/10 after eating breakfast was not tolerating secretions. Underwent emergent EGD 7/10 showing - Food in the distal esophagus s/p endoscopic evacuation of food into stomach; this was likely the cause of pt's dysphagia; examination was suspicious for achalasia; biopsy ruled out eosinophilic esophagitis; erythematous mucosa in the stomach; normal duodenal bulb and second portion of the duodenum.     Impression:  #Dysphagia secondary to food impaction in the distal esophagus s/p EGD evacuation of food into the stomach  Patient has a history of chronic aspiration with frequent coughing and choking episodes. Differential for chronic dysphagia/aspiration includes oropharyngeal phase (neurological, muscular weakness) with possible underlying achalasia. Underwent emergent EGD 7/10 showing - Food in the distal esophagus s/p endoscopic evacuation of food into stomach; this was likely the cause of pt's dysphagia; examination was suspicious for achalasia. biopsy showed no EoE. Informed patient about the biopsy results. MBS completed, recommended pureed diet with thin liquids. Patient will benefit from barium esophagram which can be completed either as inpatient or outpatient.     Recommendations:   - pending barium esophagram today.   - continue with soft diet. Avoid solid food as he is high risk for aspiration.   - Aspiration precautions, sit upright during and after eating/drinking  - if patient wants to leave home, can be discharged on soft diet and complete the barium esophagram as o/p.      GI will continue to follow.     All recommendations are tentative until note is attested by an attending.     Sisi Myers, PGY-4  Gastroenterology/Hepatology Fellow  Available on [employer] Teams  [phone] (Short Range Pager)  [phone] (Long Range Pager)    After 5pm, please contact the on-call GI fellow. [phone]     · Assessment	  86 yo M with h/o COPD, HTN, HLD, p/w acute hypercarbic respiratory failure due to COPD exacerbation. GI called to evaluate pt this evening (7/9) after pt had chicken for lunch and felt dysphagia afterwards. ENT was called for evaluation after sensation of chicken stuck in throat. ENT performed indirect laryngoscopy which was clear for any foreign body pt. On 7/10 after eating breakfast was not tolerating secretions. Underwent emergent EGD 7/10 showing - Food in the distal esophagus s/p endoscopic evacuation of food into stomach; this was likely the cause of pt's dysphagia; examination was suspicious for achalasia; biopsy ruled out eosinophilic esophagitis; erythematous mucosa in the stomach; normal duodenal bulb and second portion of the duodenum.     Impression:  #Dysphagia secondary to food impaction in the distal esophagus s/p EGD evacuation of food into the stomach  Patient has a history of chronic aspiration with frequent coughing and choking episodes. Differential for chronic dysphagia/aspiration includes oropharyngeal phase (neurological, muscular weakness) with possible underlying achalasia. Underwent emergent EGD 7/10 showing - Food in the distal esophagus s/p endoscopic evacuation of food into stomach; this was likely the cause of pt's dysphagia; examination was suspicious for achalasia. biopsy showed no EoE. Informed patient about the biopsy results. MBS completed, recommended pureed diet with thin liquids. Patient will benefit from barium esophagram which can be completed either as inpatient or outpatient.     Recommendations:   - pending barium esophagram today.   - continue with soft diet. Avoid solid food as he is high risk for aspiration.   - Aspiration precautions, sit upright during and after eating/drinking  - if patient wants to leave home, can be discharged on soft diet and complete the barium esophagram as o/p.      GI will sign off. Thank you for the consult. Please let us know if you have any questions or concerns.     All recommendations are tentative until note is attested by an attending.     Sisi Myers, PGY-4  Gastroenterology/Hepatology Fellow  Available on Microsoft Teams  [phone] (Short Range Pager)  [phone] (Long Range Pager)    After 5pm, please contact the on-call GI fellow. [phone]     · Assessment	  86 yo M with h/o COPD, HTN, HLD, p/w acute hypercarbic respiratory failure due to COPD exacerbation. GI called to evaluate pt this evening (7/9) after pt had chicken for lunch and felt dysphagia afterwards. ENT was called for evaluation after sensation of chicken stuck in throat. ENT performed indirect laryngoscopy which was clear for any foreign body pt. On 7/10 after eating breakfast was not tolerating secretions. Underwent emergent EGD 7/10 showing - Food in the distal esophagus s/p endoscopic evacuation of food into stomach; this was likely the cause of pt's dysphagia; examination was suspicious for achalasia; biopsy ruled out eosinophilic esophagitis; erythematous mucosa in the stomach; normal duodenal bulb and second portion of the duodenum.     Impression:  #Dysphagia secondary to food impaction in the distal esophagus s/p EGD evacuation of food into the stomach  Patient has a history of chronic aspiration with frequent coughing and choking episodes. Differential for chronic dysphagia/aspiration includes oropharyngeal phase (neurological, muscular weakness) with possible underlying achalasia. Underwent emergent EGD 7/10 showing - Food in the distal esophagus s/p endoscopic evacuation of food into stomach; this was likely the cause of pt's dysphagia; examination was suspicious for achalasia. biopsy showed no EoE. Informed patient about the biopsy results. MBS completed, recommended pureed diet with thin liquids. 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     Recommendations:   - continue with soft diet. Avoid solid food as he is high risk for aspiration.   - Aspiration precautions, sit upright during and after eating/drinking  - Will need to follow up with Motility specialist as o/p      GI will sign off. Thank you for the consult. Please let us know if you have any questions or concerns.     All recommendations are tentative until note is attested by an attending.     Sisi Myers, PGY-4  Gastroenterology/Hepatology Fellow  Available on Microsoft Teams  [phone] (Short Range Pager)  [phone] (Long Range Pager)    After 5pm, please contact the on-call GI fellow. [phone]

## 2022-07-14 NOTE — PROGRESS NOTE ADULT - PROBLEM SELECTOR PLAN 7
DVT ppx: lovenox  Code Status: Full Code  HCP: Granddaughter Liz Chaves [phone] (paperwork in chart)  Dispo: home PT with home oxygen

## 2022-07-14 NOTE — DISCHARGE NOTE PROVIDER - PROVIDER TOKENS
PROVIDER:[TOKEN:[[id number]],FOLLOWUP:[1 week],ESTABLISHEDPATIENT:[T]],FREE:[LAST:[GI],FIRST:[[facility]],PHONE:[phone]],FAX:[(   )    -],ADDRESS:[GI motility center],FOLLOWUP:[2 weeks]]

## 2022-07-14 NOTE — PROGRESS NOTE ADULT - ASSESSMENT
ASSESSMENT:    COPD exacerbation requiring a brief intubation for acute hypercapnic respiratory failure perhaps in the setting of aspiration -> resolved    Esophageal dysphagia s/p EGD for food impaction in the distal esophagus    HTN/HLD/CAD    PLAN/RECOMMENDATIONS:    oxygen supplementation to keep saturation greater than 92% - currently on a 2lpm nasal canula - taper as tolerated  decrease prednisone -> 10mg daily x 3 days  albuterol/atrovent nebs q6h  pulmicort 0.5mg nebs q12h - give after duoneb - rinse mouth after use  discharge on his usual Brovana and Yupelri nebulizers and albuterol MDI as needed  encouraging secretion clearance, pulmonary toilet, etc  GI evaluation ongoing    "rule out" achalasia -> awaiting esophagram     aspiration precautions    head of bed elevation at 90 degrees when eating    puree diet with thin liquids     small bites - no straws - chin tuck maneuver  cardiac meds: ASA/lipitor/norvasc  GI/DVT prophylaxis - protonix/SQ lovenox  out of bed and into the chair  ambulate as able    Will follow with you. Plan of care discussed with the patient at bedside and with the dedicated floor MLP    Aditya Simon MD, [facility]  [phone]  Pulmonary Medicine

## 2022-07-14 NOTE — PROGRESS NOTE ADULT - ATTENDING COMMENTS
EGD
Agree with above. Await esophogram results. Can advance to pureed diet as tolerated if no emergent findings on MBS/esophagram.
Agree with above. Awaiting esophagram, but if it is delayed can be done as outpatient. Would continue soft / pureed diet as tolerated, small meals, which he is not tolerating. Needs follow-up with our motility center at [phone] given possible esophageal motility disorder, but additional testing such as manometry is done as outpatient.
Agree with above. Patient tolerating PO without dysphagia or pain. Esophagram reviewed - has evidence of esophageal dysmotility. Has narrowing of esophagus from aortic notch, but doubt dysphagia lusoria as primary cause of his symptoms as EGD showed distal esophageal food impaction and it will not explain the dilated esophagus/lack of peristalsis. Would have patient continue small meals and soft/pureed diet, and follow-up with out motility center at [phone] for further workup/treatment. Continue aspiration precautions.
1. 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 Pt tolerated weaning this am and extubated to BiPAP. Npw on nasal canula. Tolerating well so far. Titrate 02 for 02 saturation of 88-92%.%.  Continue bronchodilators . Continue prednisone 40mg  x 5 days. Restart home medication tomorrow.  2 ID. Start azithromycin. Check urine legionella antigen ,RVP. Continue Azithromycin for total 3 days.  3. Bedside swallow evaluation  4. DVT prophylaxis Lovenox  5GOC: Full code
Agree with above. Patient s/p EGD for food impaction, with possible motility disorder. Awaiting esophagram for further evaluation and MBS tomorrow. Will need outpatient esophageal manometry and follow-up. Aspiration precautions, sit upright during and after eating, recommend only soft diet and small amounts at a time.

## 2022-07-14 NOTE — DISCHARGE NOTE PROVIDER - NSDCMRMEDTOKEN_GEN_ALL_CORE_FT
ALBUTEROL HFA 90 MCG INHALER: 1 puff(s) inhaled every 6 hours, As Needed  aspirin 81 mg oral tablet, chewable: 1 tab(s) orally once a day  ATORVASTATIN 40 MG TABLET: 1 tab(s) orally once a day  CARVEDILOL 6.25 MG TABLET: 1 tab(s) orally 2 times a day  FELODIPINE ER 10 MG TABLET: 1 tab(s) orally once a day  INCRUSE ELLIPTA 62.5 MCG INH: 1 puff(s) inhaled 2 times a day  LISINOPRIL 20 MG TABLET: 1 tab(s) orally once a day   albuterol 90 mcg/inh inhalation aerosol: 1 puff(s) inhaled every 6 hours  aspirin 81 mg oral tablet, chewable: 1 tab(s) orally once a day  atorvastatin 40 mg oral tablet: 1 tab(s) orally once a day (at bedtime)  FELODIPINE ER 10 MG TABLET: 1 tab(s) orally once a day  INCRUSE ELLIPTA 62.5 MCG INH: 1 puff(s) inhaled 2 times a day  predniSONE 10 mg oral tablet: 1 tab(s) orally once a day  Protonix 40 mg oral delayed release tablet: 1 tab(s) orally once a day 30 mins prior to breakfast

## 2022-07-14 NOTE — DISCHARGE NOTE PROVIDER - NSDCCPCAREPLAN_GEN_ALL_CORE_FT
PRINCIPAL DISCHARGE DIAGNOSIS  Diagnosis: COPD with acute exacerbation  Assessment and Plan of Treatment: Chronic obstructive pulmonary disease (COPD) is a lung condition in which airflow from the lungs is limited. Causes include smoking, secondhand smoke exposure, genetics, or recurrent infections. Take all medicines (inhaled or pills) as directed by your health care provider. Avoid exposure to irritants such as smoke, chemicals, and fumes that aggravate your breathing.  If you are a smoker, the most important thing that you can do is stop smoking. Continuing to smoke will cause further lung damage and breathing trouble. Ask your health care provider for help with quitting smoking.  Please follow up with Dr. Modi on Monday 7/18/22 at 1pm.   Please continue with steroid taper as per pulmonary, tapered to prednisone 10mg x 3 days on 7/14  - continue with duonebs ATC, add PRN for SOB/wheeze, budesonide  - on Incruse ellipta at home - continue on discharge   SEEK IMMEDIATE MEDICAL CARE IF YOU HAVE ANY OF THE FOLLOWING SYMPTOMS: shortness of breath at rest or when talking, bluish discoloration of lips, skin, fever, worsening cough, unexplained chest pain, or lightheadedness/dizziness.         SECONDARY DISCHARGE DIAGNOSES  Diagnosis: Acute respiratory failure with hypercapnia  Assessment and Plan of Treatment: This is now resolved and is due to COPD Exacerbation.    Diagnosis: Dysphagia  Assessment and Plan of Treatment: You had an endoscopy on 7/10 with endoscopic passage of food from esophagus into stomach  - concern for possible underlying achalasia, esophagitis per GI  - bx negative eosinophilic esophagitis  - you had a MBS recommended for pureed diet with thin liquids  - s/p esophagram showing prominent aortic knob with mass effect upon the proximal esophagus, small hiatal hernia with nonobstructed Schatzki's ring at the level of the GE junction, mod gastroesphageal reflux, abnormal esophageal motility  - Continue with pantoprazole  - You will need to continue soft/pureed diet as high risk for aspiration with solid foods.   - You will need to follow up with GI motility specialist on discharge at [phone] for further workup/treatment       PRINCIPAL DISCHARGE DIAGNOSIS  Diagnosis: COPD with acute exacerbation  Assessment and Plan of Treatment: Chronic obstructive pulmonary disease (COPD) is a lung condition in which airflow from the lungs is limited. Causes include smoking, secondhand smoke exposure, genetics, or recurrent infections. Take all medicines (inhaled or pills) as directed by your health care provider. Avoid exposure to irritants such as smoke, chemicals, and fumes that aggravate your breathing.  If you are a smoker, the most important thing that you can do is stop smoking. Continuing to smoke will cause further lung damage and breathing trouble. Ask your health care provider for help with quitting smoking.  Please follow up with Dr. Modi on Monday 7/18/22 at 1pm.   Please continue with steroid taper as per pulmonary, tapered to prednisone 10mg x 3 days until 7/16/2022  - continue with duonebs ATC, add PRN for SOB/wheeze, budesonide  - on Incruse ellipta at home - continue on discharge   SEEK IMMEDIATE MEDICAL CARE IF YOU HAVE ANY OF THE FOLLOWING SYMPTOMS: shortness of breath at rest or when talking, bluish discoloration of lips, skin, fever, worsening cough, unexplained chest pain, or lightheadedness/dizziness.         SECONDARY DISCHARGE DIAGNOSES  Diagnosis: Acute respiratory failure with hypercapnia  Assessment and Plan of Treatment: This is now resolved and is due to COPD Exacerbation.    Diagnosis: Dysphagia  Assessment and Plan of Treatment: You had an endoscopy on 7/10 with endoscopic passage of food from esophagus into stomach  - concern for possible underlying achalasia, esophagitis per GI  - bx negative eosinophilic esophagitis  - you had a MBS recommended for pureed diet with thin liquids  - s/p esophagram showing prominent aortic knob with mass effect upon the proximal esophagus, small hiatal hernia with nonobstructed Schatzki's ring at the level of the GE junction, mod gastroesphageal reflux, abnormal esophageal motility  - Continue with pantoprazole  - You will need to continue soft/pureed diet as high risk for aspiration with solid foods.   - You will need to follow up with GI motility specialist on discharge at [phone] for further workup/treatment

## 2022-07-14 NOTE — DISCHARGE NOTE PROVIDER - NSDCFUADDAPPT_GEN_ALL_CORE_FT
APPTS ARE READY TO BE MADE: [x] YES    Best Family or Patient Contact (if needed):    Additional Information about above appointments (if needed):    1: Dr. Juan Manuel Modi - Pulmonary - 1 week  2: GI motility center at [phone]  - in 2-4 weeks for outpatient manometry  3:     Other comments or requests:    APPTS ARE READY TO BE MADE: [x] YES    Best Family or Patient Contact (if needed):    Additional Information about above appointments (if needed):    1: Dr. Juan Manuel Modi - Pulmonary - 1 week  2: GI motility center at [phone]  - in 2-4 weeks for outpatient manometry  3:     Other comments or requests:   Patient was previously scheduled with Juan Manuel Camacho on (7/18) at 1 PM.  Patient was provided with the GI Motility Clinic's information and was advised to call to schedule follow up within specified time frame. At this time patient declined scheduling assistance.

## 2022-07-14 NOTE — DISCHARGE NOTE PROVIDER - HOSPITAL COURSE
88 yo male with PMH COPD (not on home O2), HTN, and HLD who presents with acute hypercarbic respiratory failure 2/2 COPD exacerbation with course c/b dysphagia and food impaction found to have abnormal esophageal motility on esophagram. overall improved s/p intubation and MICU monitoring, RVP/covid neg, sputum cx neg, CXR clear  s/p azithro, steroid taper as per pulmonary, tapered to prednisone 10mg x 3 days on 7/14, c/w duonebs ATC, add PRN for SOB/wheeze  - c/w budesonide  - on Incruse ellipta at home - continue on discharge   - patient hypoxic to 84% on RA at rest. will need home oxygen on discharge which CM has set-up  - Pulmonary following, recs appreciated  - patient has follow-up appointment with Dr. Modi Monday 7/18/22 ?1pm (granddaughter unable to recall exact time). 86 yo male with PMH COPD (not on home O2), HTN, and HLD who presents with acute hypercarbic respiratory failure 2/2 COPD exacerbation with course c/b dysphagia and food impaction found to have abnormal esophageal motility on esophagram. overall improved s/p intubation and MICU monitoring, RVP/covid neg, sputum cx neg, CXR clear  s/p azithro, steroid taper as per pulmonary, tapered to prednisone 10mg x 3 days on 7/14, c/w duonebs ATC, on Incruse ellipta at home - continue on discharge   patient hypoxic to 84% on RA at rest. home oxygen on discharge set-up and discussed with family. Patient has follow-up appointment with Dr. Modi Monday 7/18/22 ?1pm (granddaughter unable to recall exact time).

## 2022-07-14 NOTE — CHART NOTE - NSCHARTNOTEFT_GEN_A_CORE
My patient was seen today for her diagnosis of COPD . While in chronic and stable state my patient is still hypoxic due to his base line COPD. SPO2 84% while at rest on room air . With 2 LPM oxygen via N/C SPO2 at rest improved to 96%. Patient will require 24 hours .

## 2022-07-14 NOTE — PROGRESS NOTE ADULT - SUBJECTIVE AND OBJECTIVE BOX
Erinn Tomlinson MD  Division of Hospital Medicine  [facility]   Available on [employer] Teams (Mon-Fri 8am-5pm)    * messages preferred prior to calls  Other Times:  [phone]      Patient is a 87y old  Male who presents with a chief complaint of COPD exacerbation (14 Jul 2022 13:27)      SUBJECTIVE / OVERNIGHT EVENTS: no acute events overnight but desaturation to 84% on RA this morning with significant SOB and "panic attack" during this episode. relieved after oxygen resumed. we discussed he will need to leave the hospital with oxygen as he becomes hypoxic without it and requires it. he was upset regarding this but eventually verbalized understanding.  ADDITIONAL REVIEW OF SYSTEMS:    MEDICATIONS  (STANDING):  ALBUTerol    90 MICROgram(s) HFA Inhaler 2 Puff(s) Inhalation every 6 hours  albuterol/ipratropium for Nebulization 3 milliLiter(s) Nebulizer every 6 hours  amLODIPine   Tablet 10 milliGRAM(s) Oral daily  aspirin  chewable 81 milliGRAM(s) Oral daily  atorvastatin 40 milliGRAM(s) Oral at bedtime  buDESOnide    Inhalation Suspension 0.5 milliGRAM(s) Inhalation every 12 hours  dextrose 50% Injectable 25 Gram(s) IV Push once  dextrose 50% Injectable 12.5 Gram(s) IV Push once  dextrose 50% Injectable 25 Gram(s) IV Push once  dextrose Oral Gel 15 Gram(s) Oral once  enoxaparin Injectable 40 milliGRAM(s) SubCutaneous every 24 hours  glucagon  Injectable 1 milliGRAM(s) IntraMuscular once  pantoprazole  Injectable 40 milliGRAM(s) IV Push two times a day  predniSONE   Tablet 10 milliGRAM(s) Oral daily    MEDICATIONS  (PRN):      CAPILLARY BLOOD GLUCOSE      POCT Blood Glucose.: 115 mg/dL (14 Jul 2022 12:16)  POCT Blood Glucose.: 86 mg/dL (14 Jul 2022 07:54)  POCT Blood Glucose.: 100 mg/dL (14 Jul 2022 02:05)  POCT Blood Glucose.: 105 mg/dL (13 Jul 2022 21:12)  POCT Blood Glucose.: 122 mg/dL (13 Jul 2022 16:45)    I&O's Summary    13 Jul 2022 07:01  -  14 Jul 2022 07:00  --------------------------------------------------------  IN: 860 mL / OUT: 1600 mL / NET: -740 mL    14 Jul 2022 07:01  -  14 Jul 2022 14:57  --------------------------------------------------------  IN: 380 mL / OUT: 900 mL / NET: -520 mL        PHYSICAL EXAM:  Vital Signs Last 24 Hrs  T(C): 36.1 (14 Jul 2022 06:26), Max: 36.8 (13 Jul 2022 16:14)  T(F): 97 (14 Jul 2022 06:26), Max: 98.2 (13 Jul 2022 16:14)  HR: 81 (14 Jul 2022 06:26) (77 - 87)  BP: 130/68 (14 Jul 2022 06:26) (124/71 - 137/82)  BP(mean): --  RR: 18 (14 Jul 2022 06:26) (18 - 18)  SpO2: 96% (14 Jul 2022 08:46) (84% - 99%)    Parameters below as of 14 Jul 2022 08:46  Patient On (Oxygen Delivery Method): nasal cannula  O2 Flow (L/min): 2    CONSTITUTIONAL: NAD, well-developed, well-groomed  EYES: PERRLA; conjunctiva and sclera clear  ENMT: Moist oral mucosa, no pharyngeal injection or exudates; normal dentition  NECK: Supple, no palpable masses; no thyromegaly  RESPIRATORY: Normal respiratory effort; lungs are clear to auscultation bilaterally, no wheeze nor crackles on exam; no accessory muscle use  CARDIOVASCULAR: Regular rate and rhythm, normal S1 and S2, no murmur/rub/gallop; No lower extremity edema; Peripheral pulses are 2+ bilaterally  ABDOMEN: Soft, Nondistended, Nontender to palpation, normoactive bowel sounds  MUSCULOSKELETAL:  No clubbing or cyanosis of digits; no joint swelling or tenderness to palpation  PSYCH: A+O to person, place, and time; affect appropriate  NEUROLOGY: CN 2-12 are intact and symmetric; no gross sensory deficits   SKIN: No rashes; no palpable lesions    LABS:                        13.4   8.58  )-----------( 202      ( 14 Jul 2022 07:39 )             41.3     07-14    138  |  90<L>  |  13  ----------------------------<  76  4.6   |  34<H>  |  0.67    Ca    9.5      14 Jul 2022 07:44  Phos  3.1     07-14  Mg     1.8     07-14        RADIOLOGY & ADDITIONAL TESTS:  Results Reviewed:   Imaging Personally Reviewed:  7/14/22 Esophagram:  FINDINGS:  Preliminary  radiograph of the partially visualized chest is   unremarkable.    The patient swallowed barium without difficulty.    The esophagus demonstrates normal distensibility and course. There is   extrinsic mass effect on the proximal esophagus secondary to a prominent   aortic knob. There is mildly decreased primary peristalsis with some   stasis in the esophagus after the swallow. Contrast passes freely into   the stomach. There is a small hiatal hernia with a nonobstructive   Schatzki ring at the level of the GE junction. Moderate gastroesophageal   reflux was demonstrated during this examination.      IMPRESSION:  Prominent aortic knob demonstrates mass effect upon the proximal   esophagus.  Small hiatal hernia with nonobstructed Schatzki's ring at the level of   the GE junction.  Moderate gastroesophageal reflux.  Abnormal esophageal motility.  Electrocardiogram Personally Reviewed:    COORDINATION OF CARE:  Care Discussed with Consultants/Other Providers [Y]: PHONG Limon, medicine AFSHAN Ivy  Prior or Outpatient Records Reviewed [Y/N]:

## 2022-07-14 NOTE — PROGRESS NOTE ADULT - SUBJECTIVE AND OBJECTIVE BOX
Gastroenterology/Hepatology Progress Note      Interval Events:   No acute events overnight. Patient has been tolerating pureed diet and thin liquids with no dysphagia, odynophagia, abdominal pain, nausea and vomiting. Patient is scheduled for barium esophagram this afternoon.     Allergies:  No Known Allergies      Hospital Medications:  ALBUTerol    90 MICROgram(s) HFA Inhaler 2 Puff(s) Inhalation every 6 hours  albuterol/ipratropium for Nebulization 3 milliLiter(s) Nebulizer every 6 hours  amLODIPine   Tablet 10 milliGRAM(s) Oral daily  aspirin  chewable 81 milliGRAM(s) Oral daily  atorvastatin 40 milliGRAM(s) Oral at bedtime  buDESOnide    Inhalation Suspension 0.5 milliGRAM(s) Inhalation every 12 hours  dextrose 50% Injectable 25 Gram(s) IV Push once  dextrose 50% Injectable 12.5 Gram(s) IV Push once  dextrose 50% Injectable 25 Gram(s) IV Push once  dextrose Oral Gel 15 Gram(s) Oral once  enoxaparin Injectable 40 milliGRAM(s) SubCutaneous every 24 hours  glucagon  Injectable 1 milliGRAM(s) IntraMuscular once  pantoprazole  Injectable 40 milliGRAM(s) IV Push two times a day  predniSONE   Tablet 10 milliGRAM(s) Oral daily      ROS: 14 point ROS negative unless otherwise state in subjective    PHYSICAL EXAM:   Vital Signs:  Vital Signs Last 24 Hrs  T(C): 36.1 (2022 06:26), Max: 36.8 (2022 16:14)  T(F): 97 (2022 06:26), Max: 98.2 (2022 16:14)  HR: 81 (2022 06:26) (77 - 92)  BP: 130/68 (2022 06:26) (124/71 - 141/76)  BP(mean): --  RR: 18 (2022 06:26) (18 - 20)  SpO2: 99% (2022 06:26) (91% - 99%)    Parameters below as of 2022 06:26  Patient On (Oxygen Delivery Method): nasal cannula  O2 Flow (L/min): 2    Daily     Daily Weight in k (2022 11:00)    GENERAL:  No acute distress  ABDOMEN:  Soft, non-tender, non-distended, normoactive bowel sounds, no masses  EXTREMITIES:  No peripheral edema b/l  NEURO:  Answers questions appropriately and follows commands    LABS:                        13.4   8.58  )-----------( 202      ( 2022 07:39 )             41.3     Mean Cell Volume: 96.3 fl (22 @ 07:39)        133<L>  |  88<L>  |  13  ----------------------------<  85  4.9   |  34<H>  |  0.72    Ca    9.4      2022 07:17  Phos  2.8       Mg     1.9

## 2022-07-14 NOTE — PROGRESS NOTE ADULT - NSPROGADDITIONALINFOA_GEN_ALL_CORE
.  Erinn Tomlinson MD  Division of Hospital Medicine  [facility]   Available on Microsoft Teams - messages preferred prior to calls.    Plan for discharge home tomorrow with home PT and home O2 (new).    Plan discussed with patient, granddaughter Liz via phone on 7/13, PHONG Limon, and medicine NP Cata.  Will call granddaughter later in afternoon as she is currently working nights and sleeps during day. .  Erinn Tomlinson MD  Division of Hospital Medicine  [facility]   Available on Microsoft Teams - messages preferred prior to calls.    Plan for discharge home tomorrow with home PT and home O2 (new).  He will with follow-up with pulmonologist Dr. Modi on Mon 7/18/22 (has appointment) and GI motility specialist as outpatient.    Plan discussed with patient, granddaughter Liz via phone, PHONG Limon, and medicine NP Cata.

## 2022-07-14 NOTE — DISCHARGE NOTE PROVIDER - CARE PROVIDER_API CALL
Juan Manuel Modi  CRITICAL CARE MEDICINE  [address] [place] [address], [address]  [place] [address]  Phone: [phone]  Fax: [phone]  Established Patient  Follow Up Time: 1 week    GI, Motility Clinic  GI motility center  Phone: [phone]  Fax: (   )    -  Follow Up Time: 2 weeks

## 2022-07-15 ENCOUNTER — TRANSCRIPTION ENCOUNTER (OUTPATIENT)
Age: 87
End: 2022-07-15

## 2022-07-15 VITALS
DIASTOLIC BLOOD PRESSURE: 66 MMHG | SYSTOLIC BLOOD PRESSURE: 126 MMHG | TEMPERATURE: 98 F | OXYGEN SATURATION: 98 % | RESPIRATION RATE: 18 BRPM | HEART RATE: 83 BPM

## 2022-07-15 DIAGNOSIS — J96.02 ACUTE RESPIRATORY FAILURE WITH HYPERCAPNIA: ICD-10-CM

## 2022-07-15 LAB
GLUCOSE BLDC GLUCOMTR-MCNC: 100 MG/DL — HIGH (ref 70–99)
GLUCOSE BLDC GLUCOMTR-MCNC: 104 MG/DL — HIGH (ref 70–99)

## 2022-07-15 PROCEDURE — 83735 ASSAY OF MAGNESIUM: CPT

## 2022-07-15 PROCEDURE — 81001 URINALYSIS AUTO W/SCOPE: CPT

## 2022-07-15 PROCEDURE — U0005: CPT

## 2022-07-15 PROCEDURE — 36415 COLL VENOUS BLD VENIPUNCTURE: CPT

## 2022-07-15 PROCEDURE — 94640 AIRWAY INHALATION TREATMENT: CPT

## 2022-07-15 PROCEDURE — 83880 ASSAY OF NATRIURETIC PEPTIDE: CPT

## 2022-07-15 PROCEDURE — 84295 ASSAY OF SERUM SODIUM: CPT

## 2022-07-15 PROCEDURE — 85025 COMPLETE CBC W/AUTO DIFF WBC: CPT

## 2022-07-15 PROCEDURE — 82565 ASSAY OF CREATININE: CPT

## 2022-07-15 PROCEDURE — 85018 HEMOGLOBIN: CPT

## 2022-07-15 PROCEDURE — 84484 ASSAY OF TROPONIN QUANT: CPT

## 2022-07-15 PROCEDURE — 74230 X-RAY XM SWLNG FUNCJ C+: CPT

## 2022-07-15 PROCEDURE — 85610 PROTHROMBIN TIME: CPT

## 2022-07-15 PROCEDURE — 0225U NFCT DS DNA&RNA 21 SARSCOV2: CPT

## 2022-07-15 PROCEDURE — 80053 COMPREHEN METABOLIC PANEL: CPT

## 2022-07-15 PROCEDURE — 87040 BLOOD CULTURE FOR BACTERIA: CPT

## 2022-07-15 PROCEDURE — 97161 PT EVAL LOW COMPLEX 20 MIN: CPT

## 2022-07-15 PROCEDURE — 84100 ASSAY OF PHOSPHORUS: CPT

## 2022-07-15 PROCEDURE — 82962 GLUCOSE BLOOD TEST: CPT

## 2022-07-15 PROCEDURE — 87640 STAPH A DNA AMP PROBE: CPT

## 2022-07-15 PROCEDURE — 87070 CULTURE OTHR SPECIMN AEROBIC: CPT

## 2022-07-15 PROCEDURE — 82947 ASSAY GLUCOSE BLOOD QUANT: CPT

## 2022-07-15 PROCEDURE — 71045 X-RAY EXAM CHEST 1 VIEW: CPT

## 2022-07-15 PROCEDURE — 96374 THER/PROPH/DIAG INJ IV PUSH: CPT

## 2022-07-15 PROCEDURE — 93005 ELECTROCARDIOGRAM TRACING: CPT

## 2022-07-15 PROCEDURE — 93970 EXTREMITY STUDY: CPT

## 2022-07-15 PROCEDURE — 87641 MR-STAPH DNA AMP PROBE: CPT

## 2022-07-15 PROCEDURE — 96375 TX/PRO/DX INJ NEW DRUG ADDON: CPT

## 2022-07-15 PROCEDURE — 80048 BASIC METABOLIC PNL TOTAL CA: CPT

## 2022-07-15 PROCEDURE — 82435 ASSAY OF BLOOD CHLORIDE: CPT

## 2022-07-15 PROCEDURE — 85027 COMPLETE CBC AUTOMATED: CPT

## 2022-07-15 PROCEDURE — 99285 EMERGENCY DEPT VISIT HI MDM: CPT

## 2022-07-15 PROCEDURE — 82330 ASSAY OF CALCIUM: CPT

## 2022-07-15 PROCEDURE — 84132 ASSAY OF SERUM POTASSIUM: CPT

## 2022-07-15 PROCEDURE — 93308 TTE F-UP OR LMTD: CPT

## 2022-07-15 PROCEDURE — 94002 VENT MGMT INPAT INIT DAY: CPT

## 2022-07-15 PROCEDURE — 83036 HEMOGLOBIN GLYCOSYLATED A1C: CPT

## 2022-07-15 PROCEDURE — 94660 CPAP INITIATION&MGMT: CPT

## 2022-07-15 PROCEDURE — 82803 BLOOD GASES ANY COMBINATION: CPT

## 2022-07-15 PROCEDURE — 74220 X-RAY XM ESOPHAGUS 1CNTRST: CPT

## 2022-07-15 PROCEDURE — 99239 HOSP IP/OBS DSCHRG MGMT >30: CPT

## 2022-07-15 PROCEDURE — 92611 MOTION FLUOROSCOPY/SWALLOW: CPT

## 2022-07-15 PROCEDURE — 92610 EVALUATE SWALLOWING FUNCTION: CPT

## 2022-07-15 PROCEDURE — U0003: CPT

## 2022-07-15 PROCEDURE — 83605 ASSAY OF LACTIC ACID: CPT

## 2022-07-15 PROCEDURE — 85014 HEMATOCRIT: CPT

## 2022-07-15 RX ORDER — LISINOPRIL 2.5 MG/1
1 TABLET ORAL
Qty: 0 | Refills: 0 | DISCHARGE

## 2022-07-15 RX ORDER — ALBUTEROL 90 UG/1
1 AEROSOL, METERED ORAL
Qty: 0 | Refills: 0 | DISCHARGE
Start: 2022-07-15

## 2022-07-15 RX ORDER — ALBUTEROL 90 UG/1
1 AEROSOL, METERED ORAL
Qty: 0 | Refills: 0 | DISCHARGE

## 2022-07-15 RX ORDER — PANTOPRAZOLE SODIUM 20 MG/1
1 TABLET, DELAYED RELEASE ORAL
Qty: 30 | Refills: 0
Start: 2022-07-15 | End: 2022-08-13

## 2022-07-15 RX ORDER — CARVEDILOL PHOSPHATE 80 MG/1
1 CAPSULE, EXTENDED RELEASE ORAL
Qty: 0 | Refills: 0 | DISCHARGE

## 2022-07-15 RX ORDER — ASPIRIN/CALCIUM CARB/MAGNESIUM 324 MG
1 TABLET ORAL
Qty: 0 | Refills: 0 | DISCHARGE
Start: 2022-07-15

## 2022-07-15 RX ORDER — ATORVASTATIN CALCIUM 80 MG/1
1 TABLET, FILM COATED ORAL
Qty: 0 | Refills: 0 | DISCHARGE

## 2022-07-15 RX ORDER — ATORVASTATIN CALCIUM 80 MG/1
1 TABLET, FILM COATED ORAL
Qty: 0 | Refills: 0 | DISCHARGE
Start: 2022-07-15

## 2022-07-15 RX ORDER — ASPIRIN/CALCIUM CARB/MAGNESIUM 324 MG
1 TABLET ORAL
Qty: 0 | Refills: 0 | DISCHARGE

## 2022-07-15 RX ADMIN — Medication 0.5 MILLIGRAM(S): at 05:18

## 2022-07-15 RX ADMIN — Medication 10 MILLIGRAM(S): at 05:18

## 2022-07-15 RX ADMIN — Medication 3 MILLILITER(S): at 00:31

## 2022-07-15 RX ADMIN — AMLODIPINE BESYLATE 10 MILLIGRAM(S): 2.5 TABLET ORAL at 05:18

## 2022-07-15 RX ADMIN — Medication 3 MILLILITER(S): at 05:18

## 2022-07-15 RX ADMIN — Medication 81 MILLIGRAM(S): at 12:01

## 2022-07-15 RX ADMIN — PANTOPRAZOLE SODIUM 40 MILLIGRAM(S): 20 TABLET, DELAYED RELEASE ORAL at 05:17

## 2022-07-15 NOTE — DISCHARGE NOTE NURSING/CASE MANAGEMENT/SOCIAL WORK - NSDCPEFALRISK_GEN_ALL_CORE
For information on Fall & Injury Prevention, visit: [email or web address] OR  [email or web address] OR  https://www.cdc.gov/steadi/patient.html

## 2022-07-15 NOTE — DISCHARGE NOTE NURSING/CASE MANAGEMENT/SOCIAL WORK - NSDCFUADDAPPT_GEN_ALL_CORE_FT
APPTS ARE READY TO BE MADE: [x] YES    Best Family or Patient Contact (if needed):    Additional Information about above appointments (if needed):    1: Dr. Juan Manuel Modi - Pulmonary - 1 week  2: GI motility center at [phone]  - in 2-4 weeks for outpatient manometry  3:     Other comments or requests:

## 2022-07-15 NOTE — PROGRESS NOTE ADULT - PROBLEM SELECTOR PLAN 2
likely from decrease po intake   will continue to monitor FS
per patient has been occurring for a while now where he feels food is getting stuck requiring belching/coughing to clear.  - s/p EGD on 7/10 with endoscopic passage of food from esophagus into stomach  - concern for possible underlying achalasia, esophagitis per GI  - f/u bx to r/o eosinophilic esophagitis  - f/u MBS and barium esophagram - planned for today for SLP  - clear liquid diet for now pending esophagram results - if no emergent findings can advanced to pureed as tolerated per GI  - c/w pantoprazole  - would benefit from OP manometry upon discharge
-?something stuck after eating/swallowing?   -S/p glucagon.   -NPO for EGD with GI.  -PPI.
per patient has been occurring for a while now where he feels food is getting stuck requiring belching/coughing to clear.  - s/p EGD on 7/10 with endoscopic passage of food from esophagus into stomach  - concern for possible underlying achalasia, esophagitis per GI  - bx negative eosinophilic esophagitis  - s/p MBS recommended for pureed diet with thin liquids  - s/p esophagram showing prominent aortic knob with mass effect upon the proximal   esophagus, small hiatal hernia with nonobstructed Schatzki's ring at the level of the GE junction, mod gastroesphageal reflux, abnormal esophageal motility  - c/w pantoprazole  - GI follow-up appreciated  - will need to continue soft/pureed diet as high risk for aspiration with solid foods.   - will need to f/u with GI motility specialist on discharge
per patient has been occurring for a while now where he feels food is getting stuck requiring belching/coughing to clear.  - s/p EGD on 7/10 with endoscopic passage of food from esophagus into stomach  - concern for possible underlying achalasia, esophagitis per GI  - bx negative eosinophilic esophagitis  - f/u MBS and barium esophagram - planned for today for SLP  - clear liquid diet for now pending esophagram results - if no emergent findings can advanced to pureed as tolerated per GI  - c/w pantoprazole  - would benefit from OP manometry upon discharge
per patient has been occurring for a while now where he feels food is getting stuck requiring belching/coughing to clear.  - s/p EGD on 7/10 with endoscopic passage of food from esophagus into stomach  - concern for possible underlying achalasia, esophagitis per GI  - f/u bx to r/o eosinophilic esophagitis  - f/u MBS and barium esophagram - planned for tomorrow for SLP  - clear liquid diet for now until clear by GI to advance  - c/w pantoprazole  - would benefit from OP manometry upon discharge
per patient has been occurring for a while now where he feels food is getting stuck requiring belching/coughing to clear.  - s/p EGD on 7/10 with endoscopic passage of food from esophagus into stomach  - concern for possible underlying achalasia, esophagitis per GI  - bx negative eosinophilic esophagitis  - s/p MBS recommended for pureed diet with thin liquids  - s/p esophagram showing prominent aortic knob with mass effect upon the proximal   esophagus, small hiatal hernia with nonobstructed Schatzki's ring at the level of the GE junction, mod gastroesophageal reflux, abnormal esophageal motility  - c/w pantoprazole  - GI follow-up appreciated  - will need to continue soft/pureed diet as high risk for aspiration with solid foods.   - will need to f/u with GI motility specialist on discharge

## 2022-07-15 NOTE — DISCHARGE NOTE NURSING/CASE MANAGEMENT/SOCIAL WORK - PATIENT PORTAL LINK FT
You can access the FollowMyHealth Patient Portal offered by [facility] by registering at the following website: [email or web address]. By joining [employer]’s FollowMyHealth portal, you will also be able to view your health information using other applications (apps) compatible with our system.

## 2022-07-15 NOTE — PROGRESS NOTE ADULT - PROVIDER SPECIALTY LIST ADULT
Hospitalist
Pulmonology
Pulmonology
Gastroenterology
Pulmonology
Gastroenterology
Gastroenterology
MICU
Pulmonology
Gastroenterology
Gastroenterology
Hospitalist
Internal Medicine

## 2022-07-15 NOTE — PROGRESS NOTE ADULT - PROBLEM SELECTOR PLAN 1
overall improved s/p intubation and MICU monitoring   - RVP/covid neg, sputum cx neg, CXR clear  - s/p azithro  - steroid taper as per pulmonary, tapered to prednisone 10mg x 3 days on 7/14  - c/w duonebs ATC, add PRN for SOB/wheeze  - c/w budesonide  - on Incruse ellipta at home - continue on discharge   - patient hypoxic to 84% on RA at rest. will need home oxygen on discharge which CM has set-up  - Pulmonary following, recs appreciated  - patient has follow-up appointment with Dr. Modi Monday 7/18/22 ?1pm (granddaughter unable to recall exact time)

## 2022-07-15 NOTE — PROGRESS NOTE ADULT - SUBJECTIVE AND OBJECTIVE BOX
Erinn Tomlinson MD  Division of Hospital Medicine  [facility]   Available on Microsoft Teams (Mon-Fri 8am-5pm)    * messages preferred prior to calls  Other Times:  [phone]      Patient is a 87y old  Male who presents with a chief complaint of COPD exacerbation (14 Jul 2022 14:56)      SUBJECTIVE / OVERNIGHT EVENTS: no acute events overnight. no fever, chills, nor dyspnea. eating breakfast at time of my exam with no issues. asking for apple juice.  ADDITIONAL REVIEW OF SYSTEMS:    MEDICATIONS  (STANDING):  ALBUTerol    90 MICROgram(s) HFA Inhaler 2 Puff(s) Inhalation every 6 hours  albuterol/ipratropium for Nebulization 3 milliLiter(s) Nebulizer every 6 hours  amLODIPine   Tablet 10 milliGRAM(s) Oral daily  aspirin  chewable 81 milliGRAM(s) Oral daily  atorvastatin 40 milliGRAM(s) Oral at bedtime  buDESOnide    Inhalation Suspension 0.5 milliGRAM(s) Inhalation every 12 hours  dextrose 50% Injectable 25 Gram(s) IV Push once  dextrose 50% Injectable 12.5 Gram(s) IV Push once  dextrose 50% Injectable 25 Gram(s) IV Push once  dextrose Oral Gel 15 Gram(s) Oral once  enoxaparin Injectable 40 milliGRAM(s) SubCutaneous every 24 hours  glucagon  Injectable 1 milliGRAM(s) IntraMuscular once  pantoprazole  Injectable 40 milliGRAM(s) IV Push two times a day  predniSONE   Tablet 10 milliGRAM(s) Oral daily    MEDICATIONS  (PRN):      CAPILLARY BLOOD GLUCOSE      POCT Blood Glucose.: 104 mg/dL (15 Jul 2022 11:50)  POCT Blood Glucose.: 100 mg/dL (15 Jul 2022 08:00)  POCT Blood Glucose.: 120 mg/dL (14 Jul 2022 22:03)  POCT Blood Glucose.: 122 mg/dL (14 Jul 2022 16:55)  POCT Blood Glucose.: 115 mg/dL (14 Jul 2022 12:16)    I&O's Summary    14 Jul 2022 07:01  -  15 Jul 2022 07:00  --------------------------------------------------------  IN: 430 mL / OUT: 900 mL / NET: -470 mL        PHYSICAL EXAM:  Vital Signs Last 24 Hrs  T(C): 36.2 (15 Jul 2022 00:05), Max: 36.9 (14 Jul 2022 14:59)  T(F): 97.2 (15 Jul 2022 00:05), Max: 98.4 (14 Jul 2022 14:59)  HR: 75 (15 Jul 2022 06:14) (75 - 86)  BP: 127/73 (15 Jul 2022 06:14) (120/71 - 130/76)  BP(mean): --  RR: 18 (15 Jul 2022 06:14) (18 - 18)  SpO2: 100% (15 Jul 2022 06:14) (99% - 100%)    Parameters below as of 15 Jul 2022 07:45  Patient On (Oxygen Delivery Method): nasal cannula    CONSTITUTIONAL: NAD, well-developed, well-groomed  EYES: PERRLA; conjunctiva and sclera clear  ENMT: Moist oral mucosa, no pharyngeal injection or exudates; normal dentition  NECK: Supple, no palpable masses; no thyromegaly  RESPIRATORY: Normal respiratory effort; lungs are clear to auscultation bilaterally, no wheeze nor crackles on exam; no accessory muscle use  CARDIOVASCULAR: Regular rate and rhythm, normal S1 and S2, no murmur/rub/gallop; No lower extremity edema; Peripheral pulses are 2+ bilaterally  ABDOMEN: Soft, Nondistended, Nontender to palpation, normoactive bowel sounds  MUSCULOSKELETAL:  No clubbing or cyanosis of digits; no joint swelling or tenderness to palpation  PSYCH: A+O to person, place, and time; affect appropriate  NEUROLOGY: CN 2-12 are intact and symmetric; no gross sensory deficits   SKIN: No rashes; no palpable lesions    LABS:                        13.4   8.58  )-----------( 202      ( 14 Jul 2022 07:39 )             41.3     07-14    138  |  90<L>  |  13  ----------------------------<  76  4.6   |  34<H>  |  0.67    Ca    9.5      14 Jul 2022 07:44  Phos  3.1     07-14  Mg     1.8     07-14        RADIOLOGY & ADDITIONAL TESTS:  Results Reviewed:   Imaging Personally Reviewed:  Electrocardiogram Personally Reviewed:    COORDINATION OF CARE:  Care Discussed with Consultants/Other Providers [Y]: medicine AFSHAN Santos  Prior or Outpatient Records Reviewed [Y/N]:

## 2022-07-15 NOTE — PROGRESS NOTE ADULT - PROBLEM SELECTOR PLAN 6
c/w statin    7. Lovenox for DVT PPx.    8. Discussed with DARREN Gunderson and RN.
- c/w atorvastatin 40mg qHS

## 2022-07-15 NOTE — PROGRESS NOTE ADULT - PROBLEM SELECTOR PLAN 4
monitor bp   currently controlled  c/w amlodipine (on felodipine at home)  on coreg and lisinopril also - will restart if bp elevated
resolved
resolved.
resolved
resolved.
stable.
resolved

## 2022-07-15 NOTE — PROGRESS NOTE ADULT - NUTRITIONAL ASSESSMENT
This patient has been assessed with a concern for Malnutrition and has been determined to have a diagnosis/diagnoses of Severe protein-calorie malnutrition and Underweight (BMI < 19).    This patient is being managed with:   Diet NPO-  Entered: Jul 10 2022 10:08AM    
This patient has been assessed with a concern for Malnutrition and has been determined to have a diagnosis/diagnoses of Severe protein-calorie malnutrition and Underweight (BMI < 19).    This patient is being managed with:   Diet Pureed-  Entered: Jul 13 2022  4:27PM    
This patient has been assessed with a concern for Malnutrition and has been determined to have a diagnosis/diagnoses of Severe protein-calorie malnutrition and Underweight (BMI < 19).    This patient is being managed with:   Diet Pureed-  Entered: Jul 13 2022  4:27PM    
This patient has been assessed with a concern for Malnutrition and has been determined to have a diagnosis/diagnoses of Severe protein-calorie malnutrition and Underweight (BMI < 19).    This patient is being managed with:   Diet Consistent Carbohydrate Clear Liquid-  Entered: Jul 10 2022  4:41PM    

## 2022-07-15 NOTE — PROGRESS NOTE ADULT - PROBLEM SELECTOR PROBLEM 5
HTN (hypertension)
HTN (hypertension)
HLD (hyperlipidemia)
HTN (hypertension)

## 2022-07-15 NOTE — PROGRESS NOTE ADULT - ASSESSMENT
ASSESSMENT:    COPD exacerbation requiring a brief intubation for acute hypercapnic respiratory failure perhaps in the setting of aspiration -> resolved    Esophageal dysphagia s/p EGD for food impaction in the distal esophagus    HTN/HLD/CAD    7/15 - patient being discharged with home oxygen; no evidence of significant oropharyngeal dysphagia on swallowing evaluation; continues on a puree diet with thin liquids; esophagram -> prominent aortic knob demonstrates mass effect upon the proximal esophagus - small hiatal hernia with nonobstructing Schatzki's ring at the level of the GE junction - moderate gastroesophageal reflux - abnormal esophageal motility -> patient and his son have been told to arrange follow-up with a GI motility specialist; awake and alert; weak and frail; no shortness of breath or hypoxemia on a 2lpm nasal canula; minimal cough with scant sputum production; resolved chest congestion and wheeze; no fevers, chills or sweats; no chest pain/pressure or palpitations;    PLAN/RECOMMENDATIONS:    oxygen supplementation to keep saturation greater than 92% - currently on a 2lpm nasal canula   home oxygen has been arranged  decrease prednisone -> 10mg daily x 3 days  albuterol/atrovent nebs q6h  pulmicort 0.5mg nebs q12h - give after duoneb - rinse mouth after use  discharge on his usual Brovana and Yupelri nebulizers and albuterol MDI as needed  encouraging secretion clearance, pulmonary toilet, etc  GI evaluation ongoing    esophagram -> prominent aortic knob demonstrates mass effect upon the proximal esophagus - small hiatal hernia with nonobstructing Schatzki's ring at the level of the GE junction - moderate gastroesophageal reflux - abnormal esophageal motility    outpatient follow-up with a GI motility specialist needs to be arranged  aspiration precautions    head of bed elevation at 90 degrees when eating    puree diet with thin liquids     small bites - no straws - chin tuck maneuver  cardiac meds: ASA/lipitor/norvasc  GI/DVT prophylaxis - protonix/SQ lovenox  out of bed and into the chair  ambulate as able    Will follow with you. Plan of care discussed with the patient and his son at bedside. No pulmonary objection to discharge. He will follow-up with Dr. Modi of our practice next week as planned.    Aditya Simon MD, [facility]  [phone]  Pulmonary Medicine

## 2022-07-15 NOTE — PROGRESS NOTE ADULT - PROBLEM SELECTOR PLAN 5
currently controlled  - c/w amlodipine (on felodipine at home)  - on coreg and lisinopril also - will restart if bp elevated
currently controlled  - c/w amlodipine (on felodipine at home)  - on coreg and lisinopril also - will restart if bp elevated
c/w statin
currently controlled  - c/w amlodipine (on felodipine at home)  - on coreg and lisinopril also - will restart if bp elevated
monitor bp   currently controlled  c/w amlodipine (on felodipine at home)  on coreg and lisinopril also - will restart if bp elevated
currently controlled  - c/w amlodipine (on felodipine at home)  - on coreg and lisinopril also - will restart if bp elevated
currently controlled  - c/w amlodipine (on felodipine at home)  - on coreg and lisinopril also - will restart if bp elevated

## 2022-07-15 NOTE — PROGRESS NOTE ADULT - SUBJECTIVE AND OBJECTIVE BOX
[facility] - PROGRESS NOTE    CHIEF COMPLAINT: hypoxemia; COPD exacerbation; emphysema; esophageal dysphagia; cough; pulmonary cachexia; food impaction;     INTERVAL HISTORY: patient being discharged with home oxygen; no evidence of significant oropharyngeal dysphagia on swallowing evaluation; continues on a puree diet with thin liquids; esophagram -> prominent aortic knob demonstrates mass effect upon the proximal esophagus - small hiatal hernia with nonobstructing Schatzki's ring at the level of the GE junction - moderate gastroesophageal reflux - abnormal esophageal motility -> patient and his son have been told to arrange follow-up with a GI motility specialist; awake and alert; weak and frail; no shortness of breath or hypoxemia on a 2lpm nasal canula; minimal cough with scant sputum production; resolved chest congestion and wheeze; no fevers, chills or sweats; no chest pain/pressure or palpitations;    REVIEW OF SYSTEMS:  Constitutional: As per interval history  HEENT: Within normal limits  CV: As per interval history  Resp: As per interval history  GI: esophageal dysphagia  : Within normal limits  Musculoskeletal: Within normal limits  Skin: Within normal limits  Neurological: Within normal limits  Psychiatric: Within normal limits  Endocrine: Within normal limits  Hematologic/Lymphatic: Within normal limits  Allergic/Immunologic: Within normal limits      MEDICATIONS:     Pulmonary "  ALBUTerol    90 MICROgram(s) HFA Inhaler 2 Puff(s) Inhalation every 6 hours  albuterol/ipratropium for Nebulization 3 milliLiter(s) Nebulizer every 6 hours  buDESOnide    Inhalation Suspension 0.5 milliGRAM(s) Inhalation every 12 hours    Anti-microbials:    Cardiovascular:  amLODIPine   Tablet 10 milliGRAM(s) Oral daily    Other:  aspirin  chewable 81 milliGRAM(s) Oral daily  atorvastatin 40 milliGRAM(s) Oral at bedtime  dextrose 50% Injectable 25 Gram(s) IV Push once  dextrose 50% Injectable 12.5 Gram(s) IV Push once  dextrose 50% Injectable 25 Gram(s) IV Push once  dextrose Oral Gel 15 Gram(s) Oral once  enoxaparin Injectable 40 milliGRAM(s) SubCutaneous every 24 hours  glucagon  Injectable 1 milliGRAM(s) IntraMuscular once  pantoprazole  Injectable 40 milliGRAM(s) IV Push two times a day  predniSONE   Tablet 10 milliGRAM(s) Oral daily    OBJECTIVE:    POCT Blood Glucose.: 104 mg/dL (15 Jul 2022 11:50)  POCT Blood Glucose.: 100 mg/dL (15 Jul 2022 08:00)  POCT Blood Glucose.: 120 mg/dL (14 Jul 2022 22:03)  POCT Blood Glucose.: 122 mg/dL (14 Jul 2022 16:55)      PHYSICAL EXAM:       ICU Vital Signs Last 24 Hrs  T(C): 36.7 (15 Jul 2022 12:18), Max: 36.9 (14 Jul 2022 14:59)  T(F): 98.1 (15 Jul 2022 12:18), Max: 98.4 (14 Jul 2022 14:59)  HR: 83 (15 Jul 2022 12:18) (75 - 86)  BP: 126/66 (15 Jul 2022 12:18) (120/71 - 130/76)  BP(mean): --  ABP: --  ABP(mean): --  RR: 18 (15 Jul 2022 12:18) (18 - 18)  SpO2: 98% (15 Jul 2022 12:18) (98% - 100%) on 2lpm nasal canula - 84% on room air at rest     General: Awake. Alert. Cooperative. No distress. Appears stated age. Weak. Frail. Cachectic	  HEENT: Atraumatic. Bitemporal wasting. Anicteric. Normal oral mucosa. PERRL. EOMI.  Neck: Supple. Trachea midline. Thyroid without enlargement/tenderness/nodules. No carotid bruit. No JVD. Loss of bilateral supraclavicular fat pads.	  Cardiovascular: Regular rate and rhythm. S1 S2 normal. No murmurs, rubs or gallops.  Respiratory: Respirations unlabored. Decreased breath sounds throughout. No rales. No wheeze. Kyphosis.  Abdomen: Soft. Non-tender. Non-distended. No organomegaly. No masses. Normal bowel sounds.  Extremities: Warm to touch. No clubbing or cyanosis. No pedal edema.  Pulses: 2+ peripheral pulses all extremities.	  Skin: Normal skin color. No rashes or lesions. No ecchymoses. No cyanosis. Warm to touch.  Lymph Nodes: Cervical, supraclavicular and axillary nodes normal  Neurological: Motor and sensory examination equal and normal. A and O x 3  Psychiatry: Appropriate mood and affect    LABS:                          13.4   8.58  )-----------( 202      ( 14 Jul 2022 07:39 )             41.3     CBC    WBC  8.58 <==, 14.55 <==, 7.73 <==, 9.15 <==, 10.64 <==    Hemoglobin  13.4 <<==, 15.0 <<==, 14.7 <<==, 12.8 <<==, 13.2 <<==    Hematocrit  41.3 <==, 45.4 <==, 45.0 <==, 39.7 <==, 40.1 <==    Platelets  202 <==, 246 <==, 218 <==, 170 <==, 166 <==      138  |  90<L>  |  13  ----------------------------<  76    07-14  4.6   |  34<H>  |  0.67      LYTES    sodium  138 <==, 133 <==, 137 <==, 137 <==, 140 <==    potassium   4.6 <==, 4.9 <==, 4.6 <==, 4.5 <==, 5.0 <==    chloride  90 <==, 88 <==, 92 <==, 95 <==, 99 <==    carbon dioxide  34 <==, 34 <==, 36 <==, 35 <==, 34 <==    =============================================================================================  RENAL FUNCTION:    Creatinine:   0.67  <<==, 0.72  <<==, 0.68  <<==, 0.76  <<==, 0.73  <<==    BUN:   13 <==, 13 <==, 12 <==, 20 <==, 21 <==    ============================================================================================    calcium   9.5 <==, 9.4 <==, 9.7 <==, 8.9 <==, 9.1 <==    phos   3.1 <==, 2.8 <==, 2.2 <==, 3.7 <==    mag   1.8 <==, 1.9 <==, 1.6 <==, 1.8 <==    ============================================================================================  LFTs    AST:   29 <==     ALT:  17  <==     AP:  49  <=    Bili:  0.5  <=    Blood Gas Profile - Arterial (07.08.22 @ 00:30)   pH, Arterial: 7.51   pCO2, Arterial: 40 mmHg   pO2, Arterial: 139 mmHg   HCO3, Arterial: 32 mmol/L   Base Excess, Arterial: 8.2 mmol/L   Oxygen Saturation, Arterial: 99.6 %   Total CO2, Arterial: 33 mmol/L   FIO2, Arterial: 30.0   Blood Gas Source Arterial: Arterial   ---------------------------------------------------------------------------------------------------------------  Blood Gas Profile - Arterial (07.07.22 @ 13:12)   pH, Arterial: 7.38   pCO2, Arterial: 52 mmHg   pO2, Arterial: 234 mmHg   HCO3, Arterial: 31 mmol/L   Base Excess, Arterial: 4.3 mmol/L   Oxygen Saturation, Arterial: 100.0 %   Total CO2, Arterial: 32 mmol/L   FIO2, Arterial: 50   Blood Gas Source Arterial: Arterial     Blood Gas Profile - Venous (07.07.22 @ 08:10)   pH, Venous: 7.18   pCO2, Venous: 108 mmHg   pO2, Venous: 55 mmHg   HCO3, Venous: 40 mmol/L   Base Excess, Venous: 8.2 mmol/L   Oxygen Saturation, Venous: 81.2 %   Total CO2, Venous: 44 mmol/L   Blood Gas Source Venous: Venous       MICROBIOLOGY:     Respiratory Viral Panel with COVID-19 by NICKIE (07.07.22 @ 07:02)   Rapid RVP Result: [facility]   SARS-CoV-2: [facility]  This Respiratory Panel uses polymerase chain reaction (PCR) to detect for   adenovirus; coronavirus (HKU1, NL63, 229E, OC43); human metapneumovirus   (hMPV); human enterovirus/rhinovirus (Entero/RV); influenza A; influenza   A/H1; influenza A/H3; influenza A/H1-2009; influenza B; parainfluenza   viruses 1, 2, 3, 4; respiratory syncytial virus; Mycoplasma pneumoniae;   Chlamydophila pneumoniae; and SARS-CoV-2.     Culture - Sputum . (07.08.22 @ 07:05)   Culture Results:   Normal Respiratory Maggi present     Culture - Blood (07.07.22 @ 13:27)   Specimen Source: .Blood Blood   Culture Results:   No Growth Final     Culture - Blood (07.07.22 @ 13:23)   Specimen Source: .Blood Blood   Culture Results:   No Growth Final     RADIOLOGY:  [x] Chest radiographs reviewed and interpreted by me    EXAM:  XR CHEST PORTABLE URGENT 1V                          PROCEDURE DATE:  07/12/2022      FINDINGS:    The lungs are hyperaerated clear.    Heart size is within normal limits.    No acute osseous findings.      IMPRESSION:    Clear lungs.    ALEXANDRIA LANGE MD; Resident Radiology  This document has been electronically signed.  GIA VAZQUEZ MD; Attending Radiologist  This document has been electronically signed. Jul 12 2022  9:42PM  ---------------------------------------------------------------------------------------------------------------  EXAM:  XR CHEST PORTABLE URGENT 1V                          PROCEDURE DATE:  07/07/2022      FINDINGS:    Interval placement of endotracheal tube which terminates in the mid   trachea.  Cardiac size is within normal limits.  The lungs are hyperaerated, but clear.  There are no pleural effusions. No pneumothorax.  Degenerative osseous changes.    IMPRESSION:  Endotracheal tube terminates mid trachea.    VEENA DESAI MD; Resident Radiology  This document has been electronically signed.  GIA VAZQUEZ MD; Attending Radiologist  This documenthas been electronically signed. Jul 7 2022 12:37PM  ---------------------------------------------------------------------------------------------------------------  EXAM:  XR CHEST PORTABLE URGENT 1V                          PROCEDURE DATE:  07/07/2022      FINDINGS:    The lungs are hyperaerated, but clear.  No pleural effusion or pneumothorax.  Heart size is within normal limits.  No acute abnormality within visible osseous structures.    IMPRESSION:    Clear lungs.    NEHA DOUGLAS MD; Resident Radiology  This document has been electronically signed.  GIA VAZQUEZ MD; Attending Radiologist  This document has been electronically signed. Jul 7 2022 11:46AM  ---------------------------------------------------------------------------------------------------------------  EXAM:  DUPLEX SCAN EXT VEINS LOWER BI                          PROCEDURE DATE:  07/11/2022      IMPRESSION:  No evidence of acute deep venous thrombosis in either lower extremity.  Peripheral echogenicity along the left gastrocnemius vein may be sequela   of old deep venous thrombosis.    JOEL RANDLE MD; Attending Radiologist  This document has been electronically signed. Jul 11 2022  6:06PM  ---------------------------------------------------------------------------------------------------------------  EXAM:  XR ESOPH SNGL CON STUDY                          PROCEDURE DATE:  07/14/2022      FINDINGS:  Preliminary  radiograph of the partially visualized chest is   unremarkable.    The patient swallowed barium without difficulty.    The esophagus demonstrates normal distensibility and course. There is   extrinsic mass effect on the proximal esophagus secondary to a prominent   aortic knob. There is mildly decreased primary peristalsis with some   stasis in the esophagus after the swallow. Contrast passes freely into   the stomach. There is a small hiatal hernia with a nonobstructive   Schatzki ring at the level of the GE junction. Moderate gastroesophageal   reflux was demonstrated duringthis examination.      IMPRESSION:  Prominent aortic knob demonstrates mass effect upon the proximal   esophagus.  Small hiatal hernia with nonobstructed Schatzki's ring at the level of   the GE junction.  Moderate gastroesophageal reflux.  Abnormal esophageal motility.    JACQUELINE VARNER MD; Resident Radiology  This document has been electronically signed.  GIA VAZQUEZ MD; Attending Radiologist  This document has been electronically signed. Jul 14 2022  2:29PM  ---------------------------------------------------------------------------------------------------------------

## 2022-07-15 NOTE — PROGRESS NOTE ADULT - PROBLEM SELECTOR PLAN 7
DVT ppx: lovenox  Code Status: Full Code  HCP: Granddaughter Liz Chaves [phone] (paperwork in chart)  Dispo: home PT with home oxygen

## 2022-07-15 NOTE — PROGRESS NOTE ADULT - REASON FOR ADMISSION
COPD exacerbation

## 2022-07-15 NOTE — PROGRESS NOTE ADULT - PROBLEM SELECTOR PLAN 3
resolved
likely from decrease po intake   improving now that on pureed diet  - monitor. D5W gtt now off
likely from decrease po intake   improving now that on clears  - monitor. can stop D5W gtt as on clears now
likely from decrease po intake   improving now that on pureed diet  - monitor. D5W gtt now off
likely from decrease po intake   will continue to monitor FS
likely from decrease po intake   improving now that on clears  - monitor. c/w D5W gtt as needed if FS low
likely from decrease po intake   improving now that on clears  - mmonitor

## 2022-07-16 ENCOUNTER — INPATIENT (INPATIENT)
Facility: HOSPITAL | Age: 87
LOS: 4 days | Discharge: HOME HEALTH | DRG: 177 | End: 2022-07-21
Attending: STUDENT IN AN ORGANIZED HEALTH CARE EDUCATION/TRAINING PROGRAM | Admitting: HOSPITALIST
Payer: MEDICARE

## 2022-07-16 VITALS — HEIGHT: 69 IN

## 2022-07-16 DIAGNOSIS — D72.829 ELEVATED WHITE BLOOD CELL COUNT, UNSPECIFIED: ICD-10-CM

## 2022-07-16 DIAGNOSIS — Z29.9 ENCOUNTER FOR PROPHYLACTIC MEASURES, UNSPECIFIED: ICD-10-CM

## 2022-07-16 DIAGNOSIS — J96.01 ACUTE RESPIRATORY FAILURE WITH HYPOXIA: ICD-10-CM

## 2022-07-16 DIAGNOSIS — R13.10 DYSPHAGIA, UNSPECIFIED: ICD-10-CM

## 2022-07-16 DIAGNOSIS — I10 ESSENTIAL (PRIMARY) HYPERTENSION: ICD-10-CM

## 2022-07-16 DIAGNOSIS — E78.5 HYPERLIPIDEMIA, UNSPECIFIED: ICD-10-CM

## 2022-07-16 DIAGNOSIS — J44.1 CHRONIC OBSTRUCTIVE PULMONARY DISEASE WITH (ACUTE) EXACERBATION: ICD-10-CM

## 2022-07-16 LAB
ALBUMIN SERPL ELPH-MCNC: 3.9 G/DL — SIGNIFICANT CHANGE UP (ref 3.3–5)
ALP SERPL-CCNC: 69 U/L — SIGNIFICANT CHANGE UP (ref 40–120)
ALT FLD-CCNC: 24 U/L — SIGNIFICANT CHANGE UP (ref 10–45)
ANION GAP SERPL CALC-SCNC: 17 MMOL/L — SIGNIFICANT CHANGE UP (ref 5–17)
APTT BLD: 29.7 SEC — SIGNIFICANT CHANGE UP (ref 27.5–35.5)
AST SERPL-CCNC: 34 U/L — SIGNIFICANT CHANGE UP (ref 10–40)
BASOPHILS # BLD AUTO: 0.02 K/UL — SIGNIFICANT CHANGE UP (ref 0–0.2)
BASOPHILS NFR BLD AUTO: 0.1 % — SIGNIFICANT CHANGE UP (ref 0–2)
BILIRUB SERPL-MCNC: 1.2 MG/DL — SIGNIFICANT CHANGE UP (ref 0.2–1.2)
BUN SERPL-MCNC: 18 MG/DL — SIGNIFICANT CHANGE UP (ref 7–23)
CALCIUM SERPL-MCNC: 9.6 MG/DL — SIGNIFICANT CHANGE UP (ref 8.4–10.5)
CHLORIDE SERPL-SCNC: 89 MMOL/L — LOW (ref 96–108)
CO2 SERPL-SCNC: 32 MMOL/L — HIGH (ref 22–31)
CREAT SERPL-MCNC: 0.63 MG/DL — SIGNIFICANT CHANGE UP (ref 0.5–1.3)
EGFR: 93 ML/MIN/1.73M2 — SIGNIFICANT CHANGE UP
EOSINOPHIL # BLD AUTO: 0.03 K/UL — SIGNIFICANT CHANGE UP (ref 0–0.5)
EOSINOPHIL NFR BLD AUTO: 0.2 % — SIGNIFICANT CHANGE UP (ref 0–6)
GAS PNL BLDA: SIGNIFICANT CHANGE UP
GLUCOSE SERPL-MCNC: 137 MG/DL — HIGH (ref 70–99)
HCT VFR BLD CALC: 47.3 % — SIGNIFICANT CHANGE UP (ref 39–50)
HGB BLD-MCNC: 15.6 G/DL — SIGNIFICANT CHANGE UP (ref 13–17)
IMM GRANULOCYTES NFR BLD AUTO: 0.4 % — SIGNIFICANT CHANGE UP (ref 0–1.5)
INR BLD: 1.07 RATIO — SIGNIFICANT CHANGE UP (ref 0.88–1.16)
LYMPHOCYTES # BLD AUTO: 0.71 K/UL — LOW (ref 1–3.3)
LYMPHOCYTES # BLD AUTO: 4.5 % — LOW (ref 13–44)
MCHC RBC-ENTMCNC: 31.5 PG — SIGNIFICANT CHANGE UP (ref 27–34)
MCHC RBC-ENTMCNC: 33 GM/DL — SIGNIFICANT CHANGE UP (ref 32–36)
MCV RBC AUTO: 95.6 FL — SIGNIFICANT CHANGE UP (ref 80–100)
MONOCYTES # BLD AUTO: 0.96 K/UL — HIGH (ref 0–0.9)
MONOCYTES NFR BLD AUTO: 6.1 % — SIGNIFICANT CHANGE UP (ref 2–14)
NEUTROPHILS # BLD AUTO: 14.02 K/UL — HIGH (ref 1.8–7.4)
NEUTROPHILS NFR BLD AUTO: 88.7 % — HIGH (ref 43–77)
NRBC # BLD: 0 /100 WBCS — SIGNIFICANT CHANGE UP (ref 0–0)
NT-PROBNP SERPL-SCNC: 374 PG/ML — HIGH (ref 0–300)
PLATELET # BLD AUTO: 287 K/UL — SIGNIFICANT CHANGE UP (ref 150–400)
POTASSIUM SERPL-MCNC: 4.8 MMOL/L — SIGNIFICANT CHANGE UP (ref 3.5–5.3)
POTASSIUM SERPL-SCNC: 4.8 MMOL/L — SIGNIFICANT CHANGE UP (ref 3.5–5.3)
PROT SERPL-MCNC: 7.2 G/DL — SIGNIFICANT CHANGE UP (ref 6–8.3)
PROTHROM AB SERPL-ACNC: 12.3 SEC — SIGNIFICANT CHANGE UP (ref 10.5–13.4)
RAPID RVP RESULT: SIGNIFICANT CHANGE UP
RBC # BLD: 4.95 M/UL — SIGNIFICANT CHANGE UP (ref 4.2–5.8)
RBC # FLD: 12.9 % — SIGNIFICANT CHANGE UP (ref 10.3–14.5)
SARS-COV-2 RNA SPEC QL NAA+PROBE: SIGNIFICANT CHANGE UP
SODIUM SERPL-SCNC: 138 MMOL/L — SIGNIFICANT CHANGE UP (ref 135–145)
TROPONIN T, HIGH SENSITIVITY RESULT: 15 NG/L — SIGNIFICANT CHANGE UP (ref 0–51)
WBC # BLD: 15.8 K/UL — HIGH (ref 3.8–10.5)
WBC # FLD AUTO: 15.8 K/UL — HIGH (ref 3.8–10.5)

## 2022-07-16 PROCEDURE — 99223 1ST HOSP IP/OBS HIGH 75: CPT

## 2022-07-16 PROCEDURE — 71275 CT ANGIOGRAPHY CHEST: CPT | Mod: 26

## 2022-07-16 PROCEDURE — 99291 CRITICAL CARE FIRST HOUR: CPT | Mod: FT,25

## 2022-07-16 PROCEDURE — 93010 ELECTROCARDIOGRAM REPORT: CPT

## 2022-07-16 PROCEDURE — 71045 X-RAY EXAM CHEST 1 VIEW: CPT | Mod: 26

## 2022-07-16 RX ORDER — IPRATROPIUM/ALBUTEROL SULFATE 18-103MCG
3 AEROSOL WITH ADAPTER (GRAM) INHALATION ONCE
Refills: 0 | Status: COMPLETED | OUTPATIENT
Start: 2022-07-16 | End: 2022-07-16

## 2022-07-16 RX ORDER — IPRATROPIUM/ALBUTEROL SULFATE 18-103MCG
3 AEROSOL WITH ADAPTER (GRAM) INHALATION EVERY 6 HOURS
Refills: 0 | Status: DISCONTINUED | OUTPATIENT
Start: 2022-07-16 | End: 2022-07-17

## 2022-07-16 RX ORDER — ALBUTEROL 90 UG/1
1 AEROSOL, METERED ORAL EVERY 6 HOURS
Refills: 0 | Status: DISCONTINUED | OUTPATIENT
Start: 2022-07-16 | End: 2022-07-17

## 2022-07-16 RX ORDER — UMECLIDINIUM 62.5 UG/1
1 AEROSOL, POWDER ORAL
Qty: 0 | Refills: 0 | DISCHARGE

## 2022-07-16 RX ORDER — TIOTROPIUM BROMIDE 18 UG/1
1 CAPSULE ORAL; RESPIRATORY (INHALATION) DAILY
Refills: 0 | Status: DISCONTINUED | OUTPATIENT
Start: 2022-07-16 | End: 2022-07-20

## 2022-07-16 RX ORDER — LIDOCAINE 4 G/100G
1 CREAM TOPICAL ONCE
Refills: 0 | Status: COMPLETED | OUTPATIENT
Start: 2022-07-16 | End: 2022-07-16

## 2022-07-16 RX ORDER — ASPIRIN/CALCIUM CARB/MAGNESIUM 324 MG
81 TABLET ORAL DAILY
Refills: 0 | Status: DISCONTINUED | OUTPATIENT
Start: 2022-07-16 | End: 2022-07-21

## 2022-07-16 RX ORDER — ENOXAPARIN SODIUM 100 MG/ML
40 INJECTION SUBCUTANEOUS EVERY 24 HOURS
Refills: 0 | Status: DISCONTINUED | OUTPATIENT
Start: 2022-07-16 | End: 2022-07-21

## 2022-07-16 RX ORDER — ATORVASTATIN CALCIUM 80 MG/1
40 TABLET, FILM COATED ORAL AT BEDTIME
Refills: 0 | Status: DISCONTINUED | OUTPATIENT
Start: 2022-07-16 | End: 2022-07-21

## 2022-07-16 RX ORDER — FELODIPINE 5 MG/1
1 TABLET, FILM COATED, EXTENDED RELEASE ORAL
Qty: 0 | Refills: 0 | DISCHARGE

## 2022-07-16 RX ORDER — PANTOPRAZOLE SODIUM 20 MG/1
40 TABLET, DELAYED RELEASE ORAL
Refills: 0 | Status: DISCONTINUED | OUTPATIENT
Start: 2022-07-16 | End: 2022-07-21

## 2022-07-16 RX ORDER — ACETAMINOPHEN 500 MG
650 TABLET ORAL EVERY 6 HOURS
Refills: 0 | Status: DISCONTINUED | OUTPATIENT
Start: 2022-07-16 | End: 2022-07-21

## 2022-07-16 RX ORDER — AMLODIPINE BESYLATE 2.5 MG/1
10 TABLET ORAL DAILY
Refills: 0 | Status: DISCONTINUED | OUTPATIENT
Start: 2022-07-16 | End: 2022-07-21

## 2022-07-16 RX ADMIN — Medication 125 MILLIGRAM(S): at 16:34

## 2022-07-16 RX ADMIN — Medication 3 MILLILITER(S): at 15:25

## 2022-07-16 RX ADMIN — Medication 3 MILLILITER(S): at 22:33

## 2022-07-16 RX ADMIN — Medication 3 MILLILITER(S): at 15:58

## 2022-07-16 RX ADMIN — Medication 3 MILLILITER(S): at 15:38

## 2022-07-16 RX ADMIN — LIDOCAINE 1 PATCH: 4 CREAM TOPICAL at 22:31

## 2022-07-16 NOTE — ED PROVIDER NOTE - CLINICAL SUMMARY MEDICAL DECISION MAKING FREE TEXT BOX
Dianne Mack MD, PGY-3: 88YO M hx COPD p/w worsening SOB. vss, pe diminished breath sounds diffusely, increased WOB. suspect COPD exacerbation, consider PNA given recent hospitalization. plan for bipap, cardiac w/u, admission.

## 2022-07-16 NOTE — H&P ADULT - ASSESSMENT
87M with PMH of COPD, HTN, HLD, recent admission for copd exacerbation presents to the ED with shortness of breath found to be in acute hypoxic and hypercarbic respiratory failure.  History obtained from patient and grandson at bedside. Patient was recently admitted to the hospital for COPD exacerbation, briefly intubated for hypercarbic respiratory failure requiring MICU stay,  Exam shows significantly increased WOB, respiratory distress, decreased air entry.   Will Tx as acute severe COPD exacerbation. Will evaluate for cardiopulmonary pathology including pneumonia, bronchospasm, pulmonary edema.   Differential includes asthma/COPD and CHF exacerbation. Will obtain EKG and keep patient on cardiac monitor and evaluate for ACS.   Will keep patient on pulse oximeter and provide supplemental O2 via BIPAP due to increased WOB.      87M with PMH of COPD, HTN, HLD, recent admission for copd exacerbation presents to the ED with shortness of breath found to be in acute hypoxic and hypercarbic respiratory failure.

## 2022-07-16 NOTE — H&P ADULT - PROBLEM SELECTOR PLAN 7
- DVT ppx: Lovenox qd  - Diet: pureed diet  - Dispo: pending clinical improvement - DVT ppx: Lovenox qd  - Diet: NPO  - Dispo: pending clinical improvement

## 2022-07-16 NOTE — ED PROVIDER NOTE - ATTENDING CONTRIBUTION TO CARE
Juan Jose Roe MD:   I personally saw the patient and performed a substantive portion of the visit including all aspects of the medical decision making.    MDM: 87 M w/ Hx of COPD, recent discharge yesterday at 2 pm; admitted during recent hospitalization due to COPD exac; previously was not on home O2, but upon discharge pt requiring O2 due to hypoxia to 84%. Pt with acute SOB as of this morning around 10 am when waking up. Pt denies F/C, CP, cough, leg swelling. Hx as per pt and grandson at bedside.   Exam shows significantly increased WOB, respiratory distress, decreased air entry.   Will Tx as acute severe COPD exacerbation. Will evaluate for cardiopulmonary pathology including pneumonia, bronchospasm, pulmonary edema.   Differential includes asthma/COPD and CHF exacerbation. Will obtain EKG and keep patient on cardiac monitor and evaluate for ACS.   Will keep patient on pulse oximeter and provide supplemental O2 via BIPAP due to increased WOB.       Critical Care Billing: Acute on Chronic Hypoxic Respiratory Failure  Upon my evaluation, this patient had a high probability of imminent or life-threatening deterioration, which required my direct attention, intervention, and personal management.  The patient has a  medical condition that impairs one or more vital organ systems.  Frequent personal assessment and adjustment of medical interventions was performed.      I have personally provided # minutes of critical care time exclusive of time spent on separately billable procedures. Time includes review of laboratory data, radiology results, discussion with consultants, patient and family; monitoring for potential decompensation, as well as time spent retrieving data and reviewing the chart and documenting the visit. Interventions were performed as documented above. Juan Jose Roe MD:   I personally saw the patient and performed a substantive portion of the visit including all aspects of the medical decision making.    MDM: 87 M w/ Hx of COPD, recent discharge yesterday at 2 pm; admitted during recent hospitalization due to COPD exac; previously was not on home O2, but upon discharge pt requiring O2 due to hypoxia to 84%. Pt with acute SOB as of this morning around 10 am when waking up. Pt denies F/C, CP, cough, leg swelling. Hx as per pt and grandson at bedside.   Exam shows significantly increased WOB, respiratory distress, decreased air entry.   Will Tx as acute severe COPD exacerbation. Will evaluate for cardiopulmonary pathology including pneumonia, bronchospasm, pulmonary edema.   Differential includes asthma/COPD and CHF exacerbation. Will obtain EKG and keep patient on cardiac monitor and evaluate for ACS.   Will keep patient on pulse oximeter and provide supplemental O2 via BIPAP due to increased WOB.     Critical Care Billing: Acute on Chronic Hypoxic Respiratory Failure  Upon my evaluation, this patient had a high probability of imminent or life-threatening deterioration, which required my direct attention, intervention, and personal management.  The patient has a  medical condition that impairs one or more vital organ systems.  Frequent personal assessment and adjustment of medical interventions was performed.      I have personally provided # minutes of critical care time exclusive of time spent on separately billable procedures. Time includes review of laboratory data, radiology results, discussion with consultants, patient and family; monitoring for potential decompensation, as well as time spent retrieving data and reviewing the chart and documenting the visit. Interventions were performed as documented above. Juan Jose Roe MD:   I personally saw the patient and performed a substantive portion of the visit including all aspects of the medical decision making.    MDM: 87 M w/ Hx of COPD, HTN, HLD, with recent discharge yesterday at 2 pm; admitted during recent hospitalization due to acute hypercarbic resp failure 2/2 COPD exac; previously was not on home O2, but upon discharge pt requiring O2 due to hypoxia to 84%. Pt with acute SOB as of this morning around 10 am when waking up. Pt denies F/C, CP, cough, leg swelling. Hx as per pt and grandson at bedside.   Exam shows significantly increased WOB, respiratory distress, decreased air entry.   Will Tx as acute severe COPD exacerbation. Will evaluate for cardiopulmonary pathology including pneumonia, bronchospasm, pulmonary edema.   Differential includes asthma/COPD and CHF exacerbation. Will obtain EKG and keep patient on cardiac monitor and evaluate for ACS.   Will keep patient on pulse oximeter and provide supplemental O2 via BIPAP due to increased WOB.     Critical Care Billing: Acute on Chronic Hypoxic Respiratory Failure  Upon my evaluation, this patient had a high probability of imminent or life-threatening deterioration, which required my direct attention, intervention, and personal management.  The patient has a  medical condition that impairs one or more vital organ systems.  Frequent personal assessment and adjustment of medical interventions was performed.      I have personally provided # minutes of critical care time exclusive of time spent on separately billable procedures. Time includes review of laboratory data, radiology results, discussion with consultants, patient and family; monitoring for potential decompensation, as well as time spent retrieving data and reviewing the chart and documenting the visit. Interventions were performed as documented above. Juan Jose Roe MD:   I personally saw the patient and performed a substantive portion of the visit including all aspects of the medical decision making.    MDM: 87 M w/ Hx of COPD, HTN, HLD, with recent discharge yesterday at 2 pm; admitted during recent hospitalization due to acute hypercarbic resp failure 2/2 COPD exac; previously was not on home O2, but upon discharge pt requiring O2 due to hypoxia to 84%. Pt with acute SOB as of this morning around 10 am when waking up. Pt denies F/C, CP, cough, leg swelling. Hx as per pt and grandson at bedside.   Exam shows significantly increased WOB, respiratory distress, decreased air entry.   Will Tx as acute severe COPD exacerbation. Will evaluate for cardiopulmonary pathology including pneumonia, bronchospasm, pulmonary edema.   Differential includes asthma/COPD and CHF exacerbation. Will obtain EKG and keep patient on cardiac monitor and evaluate for ACS.   Will keep patient on pulse oximeter and provide supplemental O2 via BIPAP due to increased WOB.     Pt improved on BIPAP, stable for floor admission.   Labs and imaging reviewed.   The patient will need to be admitted to the hospital for continued evaluation and management, as well as to optimize medical management and to provide outpatient needs assessment.  I had a discussion with the accepting physician regarding the initial presentation, diagnostic studies, treatments given in the ED, and current plan of care.   The patient was accepted by and endorsed to the medicine team.     Critical Care Billing: Acute on Chronic Hypoxic Respiratory Failure  Upon my evaluation, this patient had a high probability of imminent or life-threatening deterioration, which required my direct attention, intervention, and personal management.  The patient has a  medical condition that impairs one or more vital organ systems.  Frequent personal assessment and adjustment of medical interventions was performed.      I have personally provided 40 minutes of critical care time exclusive of time spent on separately billable procedures. Time includes review of laboratory data, radiology results, discussion with consultants, patient and family; monitoring for potential decompensation, as well as time spent retrieving data and reviewing the chart and documenting the visit. Interventions were performed as documented above.

## 2022-07-16 NOTE — ED PROVIDER NOTE - PHYSICAL EXAMINATION
Gen: WDWN, NAD  HEENT: EOMI, no nasal discharge, mucous membranes moist  CV: tachycardic, 2+ radial pulses R arm  Resp: poor air movement diffusely, + accessory muscle use, + increased work of breathing  GI: Abdomen soft non-distended, NTTP  MSK: No open wounds, no bruising, no LE edema  Neuro: A&Ox4, following commands, moving all four extremities spontaneously  Psych: appropriate mood

## 2022-07-16 NOTE — ED ADULT NURSE REASSESSMENT NOTE - NS ED NURSE REASSESS COMMENT FT1
Report received from RN Morgan and Ankur. Pt received A&Ox3, IV intact and patent, pt on BiPAP, VSS, pt endorsing neck pain due to bed and BiPAP mask, bed locked and in lowest position, call bell within reach and pt instructed on use, safety and comfort measures maintained, provided pillow for comfort measures

## 2022-07-16 NOTE — ED ADULT NURSE REASSESSMENT NOTE - NS ED NURSE REASSESS COMMENT FT1
Pt repositioned in bed, pt denies pain, reporting some discomfort from mask but readjusted. Pt updated on POC, chest x ray at bedside

## 2022-07-16 NOTE — H&P ADULT - NSHPLABSRESULTS_GEN_ALL_CORE
EKG reviewed. Sinus tachycardia with nonspecific ST/Twave changes  Imaging reviewed. CXR shows clear lungs  Labs reviewed

## 2022-07-16 NOTE — ED PROVIDER NOTE - NS ED ROS FT
Gen: Denies fevers  CV: Denies chest pain  Resp: + SOB  GI: Denies nausea, vomiting  Msk: Denies extremity edema

## 2022-07-16 NOTE — H&P ADULT - PROBLEM SELECTOR PLAN 4
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

## 2022-07-16 NOTE — H&P ADULT - HISTORY OF PRESENT ILLNESS
87M with PMH of COPD, HTN, HLD presents to the ED with hypoxia. History obtained from patient and grandson at bedside. Patient was recently admitted to the hospital for COPD exacerbation, briefly intubated for hypercarbic respiratory failure requiring MICU stay, and discharged home yesterday. Patient was discharged home on 2L nasal cannula. This morning, patient was found to be hypoxic to the 70s-80s on room air by visiting nurse and sent to the ED. Patient had some shortness of breath when he woke up this morning. Otherwise he denies any fevers, chills, cough, or chest pain. Patient denies any nausea, vomiting, abdominal pain, diarrhea, or dysuria. No melena or hematochezia. Patient is frustrated about being in the hospital again.     In the ED, T is 99, , /94, RR 27 satting 96% on 6L NC. Patient placed on bipap for increased work of breathing, respiratory distress. Patient received solumedrol 125mg IVX1, duoneb X3.

## 2022-07-16 NOTE — ED ADULT NURSE NOTE - OBJECTIVE STATEMENT
86 yo male presenting to the ED aaox3 with complaints of worsening sob x 1 day. PMH asthma, copd, hld. Pt presented tachypnea and complaining of sob. Pt was recently dc yesterday from [facility]. Per grandson at bedside, pt was complaining of sob and weakness this morning and noticed his home spo2 was in the 80s. Pt was dc with home oxygen. Pt denies cp, n/v, fever, chills.

## 2022-07-16 NOTE — H&P ADULT - PROBLEM SELECTOR PLAN 3
Leukocytosis to 15 in the setting of steroid use vs infection, possible PNA  - no obvious signs of infection  - CXR shows clear lungs  - f/u CT chest to r/o PNA  - f/u procalcitonin  - trend WBC Leukocytosis to 15 in the setting of steroid use vs infection, possible PNA  - CXR shows clear lungs however CT chest concerning for PNA  - f/u procalcitonin  - start zosyn for possible aspiration pna  - trend WBC

## 2022-07-16 NOTE — ED ADULT NURSE NOTE - NS PRO PASSIVE SMOKE EXP
68 y/o male with Lomax Sims
No

## 2022-07-16 NOTE — ED PROVIDER NOTE - PROGRESS NOTE DETAILS
Dianne Mack MD, PGY-3: signout given to hospitalist. pt on BIPAP, not new BIPAP. tba for COPD exacerbation

## 2022-07-16 NOTE — ED PROVIDER NOTE - OBJECTIVE STATEMENT
88YO M hx COPD p/w worsening SOB. was dc'd from hospital days prior for copd exacerbation. BIBA, pt hypoxic to the 70s on RA, improved with NC. pt denies cp, pleuritic pain, b/l LE edema, fevers, cough

## 2022-07-16 NOTE — H&P ADULT - PROBLEM SELECTOR PLAN 2
hx of recent copd exacerbation discharged home with prednisone 10  - concern for copd exacerbation  - s/p solumedrol 125mg IV X1 in the ED  - will start prednisone 40mg tomorrow  - duoneb ATC  - wean down supplemental O2  - pulm consult in am

## 2022-07-16 NOTE — H&P ADULT - PROBLEM SELECTOR PLAN 1
Patient presents with acute on chronic hypoxia as well as hypercapnia possibly 2/2 copd vs pna vs less likely pe  - CXR shows clear lungs; trop at 15, repeat pending; bnp at 374  - see tx for copd below  - f/u CTA chest to r/o PNA given high risk for aspiration as well as PE given tachycardia, decreased mobility  - f/u VBG in am for hypercapnia, repeat lactate  - wean down supplemental O2 as tolerated  - duoneb ATC, spiriva  - pulm consult in am Patient presents with acute on chronic hypoxia as well as hypercapnia possibly 2/2 copd vs pna vs less likely pe  - CXR shows clear lungs; trop at 15, repeat pending; bnp at 374  - CTA chest negative for PE however shows RLL opacities concerning for aspiration PNA  - start zosyn  - f/u VBG in am for hypercapnia, repeat lactate  - see tx for copd below  - wean down supplemental O2 as tolerated  - duoneb ATC, spiriva  - pulm consult in am

## 2022-07-17 LAB
BASE EXCESS BLDV CALC-SCNC: 12 MMOL/L — HIGH (ref -2–2)
CA-I SERPL-SCNC: 1.22 MMOL/L — SIGNIFICANT CHANGE UP (ref 1.15–1.33)
CHLORIDE BLDV-SCNC: 90 MMOL/L — LOW (ref 96–108)
CO2 BLDV-SCNC: 43 MMOL/L — HIGH (ref 22–26)
GAS PNL BLDV: 130 MMOL/L — LOW (ref 136–145)
GAS PNL BLDV: SIGNIFICANT CHANGE UP
GAS PNL BLDV: SIGNIFICANT CHANGE UP
GLUCOSE BLDV-MCNC: 118 MG/DL — HIGH (ref 70–99)
HCO3 BLDV-SCNC: 41 MMOL/L — HIGH (ref 22–29)
HCT VFR BLDA CALC: 41 % — SIGNIFICANT CHANGE UP (ref 39–51)
HGB BLD CALC-MCNC: 13.5 G/DL — SIGNIFICANT CHANGE UP (ref 12.6–17.4)
LACTATE BLDV-MCNC: 1.9 MMOL/L — SIGNIFICANT CHANGE UP (ref 0.7–2)
PCO2 BLDV: 74 MMHG — HIGH (ref 42–55)
PH BLDV: 7.35 — SIGNIFICANT CHANGE UP (ref 7.32–7.43)
PO2 BLDV: 39 MMHG — SIGNIFICANT CHANGE UP (ref 25–45)
POTASSIUM BLDV-SCNC: 5.2 MMOL/L — HIGH (ref 3.5–5.1)
SAO2 % BLDV: 60.5 % — LOW (ref 67–88)
TROPONIN T, HIGH SENSITIVITY RESULT: 15 NG/L — SIGNIFICANT CHANGE UP (ref 0–51)

## 2022-07-17 PROCEDURE — 99233 SBSQ HOSP IP/OBS HIGH 50: CPT

## 2022-07-17 RX ORDER — BUDESONIDE, MICRONIZED 100 %
0.5 POWDER (GRAM) MISCELLANEOUS
Refills: 0 | Status: DISCONTINUED | OUTPATIENT
Start: 2022-07-17 | End: 2022-07-21

## 2022-07-17 RX ORDER — IPRATROPIUM/ALBUTEROL SULFATE 18-103MCG
3 AEROSOL WITH ADAPTER (GRAM) INHALATION EVERY 6 HOURS
Refills: 0 | Status: DISCONTINUED | OUTPATIENT
Start: 2022-07-17 | End: 2022-07-21

## 2022-07-17 RX ORDER — PIPERACILLIN AND TAZOBACTAM 4; .5 G/20ML; G/20ML
3.38 INJECTION, POWDER, LYOPHILIZED, FOR SOLUTION INTRAVENOUS EVERY 8 HOURS
Refills: 0 | Status: DISCONTINUED | OUTPATIENT
Start: 2022-07-17 | End: 2022-07-21

## 2022-07-17 RX ORDER — SODIUM CHLORIDE 9 MG/ML
1000 INJECTION, SOLUTION INTRAVENOUS
Refills: 0 | Status: COMPLETED | OUTPATIENT
Start: 2022-07-17 | End: 2022-07-17

## 2022-07-17 RX ORDER — PIPERACILLIN AND TAZOBACTAM 4; .5 G/20ML; G/20ML
3.38 INJECTION, POWDER, LYOPHILIZED, FOR SOLUTION INTRAVENOUS ONCE
Refills: 0 | Status: COMPLETED | OUTPATIENT
Start: 2022-07-17 | End: 2022-07-17

## 2022-07-17 RX ADMIN — PIPERACILLIN AND TAZOBACTAM 200 GRAM(S): 4; .5 INJECTION, POWDER, LYOPHILIZED, FOR SOLUTION INTRAVENOUS at 06:53

## 2022-07-17 RX ADMIN — PANTOPRAZOLE SODIUM 40 MILLIGRAM(S): 20 TABLET, DELAYED RELEASE ORAL at 10:44

## 2022-07-17 RX ADMIN — AMLODIPINE BESYLATE 10 MILLIGRAM(S): 2.5 TABLET ORAL at 10:44

## 2022-07-17 RX ADMIN — Medication 40 MILLIGRAM(S): at 10:43

## 2022-07-17 RX ADMIN — ENOXAPARIN SODIUM 40 MILLIGRAM(S): 100 INJECTION SUBCUTANEOUS at 06:53

## 2022-07-17 RX ADMIN — LIDOCAINE 1 PATCH: 4 CREAM TOPICAL at 06:16

## 2022-07-17 RX ADMIN — ALBUTEROL 1 PUFF(S): 90 AEROSOL, METERED ORAL at 10:45

## 2022-07-17 RX ADMIN — SODIUM CHLORIDE 60 MILLILITER(S): 9 INJECTION, SOLUTION INTRAVENOUS at 13:10

## 2022-07-17 RX ADMIN — PIPERACILLIN AND TAZOBACTAM 25 GRAM(S): 4; .5 INJECTION, POWDER, LYOPHILIZED, FOR SOLUTION INTRAVENOUS at 17:57

## 2022-07-17 RX ADMIN — Medication 3 MILLILITER(S): at 04:32

## 2022-07-17 RX ADMIN — PIPERACILLIN AND TAZOBACTAM 25 GRAM(S): 4; .5 INJECTION, POWDER, LYOPHILIZED, FOR SOLUTION INTRAVENOUS at 10:42

## 2022-07-17 RX ADMIN — Medication 650 MILLIGRAM(S): at 11:15

## 2022-07-17 RX ADMIN — Medication 0.5 MILLIGRAM(S): at 17:57

## 2022-07-17 RX ADMIN — ALBUTEROL 1 PUFF(S): 90 AEROSOL, METERED ORAL at 01:31

## 2022-07-17 RX ADMIN — Medication 3 MILLILITER(S): at 17:57

## 2022-07-17 RX ADMIN — Medication 650 MILLIGRAM(S): at 10:42

## 2022-07-17 RX ADMIN — ATORVASTATIN CALCIUM 40 MILLIGRAM(S): 80 TABLET, FILM COATED ORAL at 21:59

## 2022-07-17 RX ADMIN — ALBUTEROL 1 PUFF(S): 90 AEROSOL, METERED ORAL at 06:53

## 2022-07-17 RX ADMIN — LIDOCAINE 1 PATCH: 4 CREAM TOPICAL at 10:31

## 2022-07-17 RX ADMIN — TIOTROPIUM BROMIDE 1 CAPSULE(S): 18 CAPSULE ORAL; RESPIRATORY (INHALATION) at 10:50

## 2022-07-17 RX ADMIN — Medication 81 MILLIGRAM(S): at 10:44

## 2022-07-17 NOTE — PROGRESS NOTE ADULT - SUBJECTIVE AND OBJECTIVE BOX
[facility] - PROGRESS NOTE    CHIEF COMPLAINT: acute on chronic hypoxic respiratory failure; acute hypercapnic respiratory failure; COPD; emphysema    INTERVAL HISTORY: remains off NIV after a returning two days after discharge with acute on chronic hypoxic/hypercapnic respiratory failure; patient had been discharged with home oxygen; he had no evidence of significant oropharyngeal dysphagia on swallowing evaluation but continued on a puree diet with thin liquids; esophagram -> prominent aortic knob demonstrates mass effect upon the proximal esophagus - small hiatal hernia with nonobstructing Schatzki's ring at the level of the GE junction - moderate gastroesophageal reflux - abnormal esophageal motility -> patient and his son were told to arrange follow-up with a GI motility specialist;     REVIEW OF SYSTEMS:  Constitutional: As per interval history  HEENT: Within normal limits  CV: As per interval history  Resp: As per interval history  GI: Within normal limits   : Within normal limits  Musculoskeletal: Within normal limits  Skin: Within normal limits  Neurological: Within normal limits  Psychiatric: Within normal limits  Endocrine: Within normal limits  Hematologic/Lymphatic: Within normal limits  Allergic/Immunologic: Within normal limits    MEDICATIONS:     Pulmonary "  albuterol/ipratropium for Nebulization 3 milliLiter(s) Nebulizer every 6 hours  buDESOnide    Inhalation Suspension 0.5 milliGRAM(s) Inhalation two times a day  tiotropium 18 MICROgram(s) Capsule 1 Capsule(s) Inhalation daily    Anti-microbials:  piperacillin/tazobactam IVPB.. 3.375 Gram(s) IV Intermittent every 8 hours    Cardiovascular:  amLODIPine   Tablet 10 milliGRAM(s) Oral daily    Other:  aspirin  chewable 81 milliGRAM(s) Oral daily  atorvastatin 40 milliGRAM(s) Oral at bedtime  enoxaparin Injectable 40 milliGRAM(s) SubCutaneous every 24 hours  lactated ringers. 1000 milliLiter(s) IV Continuous <Continuous>  pantoprazole    Tablet 40 milliGRAM(s) Oral before breakfast  predniSONE   Tablet 40 milliGRAM(s) Oral daily    MEDICATIONS  (PRN):  acetaminophen     Tablet .. 650 milliGRAM(s) Oral every 6 hours PRN Temp greater or equal to 38C (100.4F), Mild Pain (1 - 3), Moderate Pain (4 - 6)    OBJECTIVE:    PHYSICAL EXAM:       ICU Vital Signs Last 24 Hrs  T(C): 36.6 (17 Jul 2022 19:20), Max: 36.7 (17 Jul 2022 12:32)  T(F): 97.9 (17 Jul 2022 19:20), Max: 98 (17 Jul 2022 12:32)  HR: 75 (17 Jul 2022 19:20) (67 - 102)  BP: 110/61 (17 Jul 2022 19:20) (100/49 - 112/71)  BP(mean): 84 (16 Jul 2022 22:45) (84 - 84)  ABP: --  ABP(mean): --  RR: 18 (17 Jul 2022 19:20) (18 - 22)  SpO2: 98% (17 Jul 2022 19:20) (98% - 100%)     General: Awake. Alert. Cooperative. No distress. Appears stated age.	  HEENT: Atraumatic. Normocephalic. Anicteric. Normal oral mucosa. PERRL. EOMI.  Neck: Supple. Trachea midline. Thyroid without enlargement/tenderness/nodules. No carotid bruit. No JVD.	  Cardiovascular: Regular rate and rhythm. S1 S2 normal. No murmurs, rubs or gallops.  Respiratory: Respirations unlabored. Clear to auscultation and percussion bilaterally. No curvature.  Abdomen: Soft. Non-tender. Non-distended. No organomegaly. No masses. Normal bowel sounds.  Extremities: Warm to touch. No clubbing or cyanosis. No pedal edema.  Pulses: 2+ peripheral pulses all extremities.	  Skin: Normal skin color. No rashes or lesions. No ecchymoses. No cyanosis. Warm to touch.  Lymph Nodes: Cervical, supraclavicular and axillary nodes normal  Neurological: Motor and sensory examination equal and normal. A and O x 3  Psychiatry: Appropriate mood and affect.    LABS:                          15.6   15.80 )-----------( 287      ( 16 Jul 2022 15:45 )             47.3     CBC    WBC  15.80 <==, 8.58 <==, 14.55 <==, 7.73 <==    Hemoglobin  15.6 <<==, 13.4 <<==, 15.0 <<==, 14.7 <<==    Hematocrit  47.3 <==, 41.3 <==, 45.4 <==, 45.0 <==    Platelets  287 <==, 202 <==, 246 <==, 218 <==      138  |  89<L>  |  18  ----------------------------<  137<H>    07-16  4.8   |  32<H>  |  0.63      LYTES    sodium  138 <==, 138 <==, 133 <==, 137 <==    potassium   4.8 <==, 4.6 <==, 4.9 <==, 4.6 <==    chloride  89 <==, 90 <==, 88 <==, 92 <==    carbon dioxide  32 <==, 34 <==, 34 <==, 36 <==    =============================================================================================  RENAL FUNCTION:    Creatinine:   0.63  <<==, 0.67  <<==, 0.72  <<==, 0.68  <<==    BUN:   18 <==, 13 <==, 13 <==, 12 <==    ============================================================================================    calcium   9.6 <==, 9.5 <==, 9.4 <==, 9.7 <==    phos   3.1 <==, 2.8 <==, 2.2 <==    mag   1.8 <==, 1.9 <==, 1.6 <==    ============================================================================================  LFTs    AST:   34 <==     ALT:  24  <==     AP:  69  <=    Bili:  1.2  <=    PT/INR - ( 16 Jul 2022 15:45 )   PT: 12.3 sec;   INR: 1.07 ratio      PTT - ( 16 Jul 2022 15:45 )  PTT:29.7 sec    Venous Blood Gas:  07-17 @ 06:00  7.35/74/39/41/60.5  VBG Lactate: 1.9    ABG - ( 16 Jul 2022 15:32 )  pH: 7.45  /  pCO2: 62    /  pO2: 40    / HCO3: 43    / Base Excess: 15.5  /  SaO2: 63.5      Blood Gas Profile - Arterial (07.08.22 @ 00:30)   pH, Arterial: 7.51   pCO2, Arterial: 40 mmHg   pO2, Arterial: 139 mmHg   HCO3, Arterial: 32 mmol/L   Base Excess, Arterial: 8.2 mmol/L   Oxygen Saturation, Arterial: 99.6 %   Total CO2, Arterial: 33 mmol/L   FIO2, Arterial: 30.0   Blood Gas Source Arterial: Arterial   ---------------------------------------------------------------------------------------------------------------  Blood Gas Profile - Arterial (07.07.22 @ 13:12)   pH, Arterial: 7.38   pCO2, Arterial: 52 mmHg   pO2, Arterial: 234 mmHg   HCO3, Arterial: 31 mmol/L   Base Excess, Arterial: 4.3 mmol/L   Oxygen Saturation, Arterial: 100.0 %   Total CO2, Arterial: 32 mmol/L   FIO2, Arterial: 50   Blood Gas Source Arterial: Arterial     Blood Gas Profile - Venous (07.07.22 @ 08:10)   pH, Venous: 7.18   pCO2, Venous: 108 mmHg   pO2, Venous: 55 mmHg   HCO3, Venous: 40 mmol/L   Base Excess, Venous: 8.2 mmol/L   Oxygen Saturation, Venous: 81.2 %   Total CO2, Venous: 44 mmol/L   Blood Gas Source Venous: Venous       Procalcitonin, Serum: 0.06 ng/mL (07-16 @ 15:45)    Serum Pro-Brain Natriuretic Peptide: 374 pg/mL (07-16 @ 15:45)    CARDIAC MARKERS ( 16 Jul 2022 23:33 )  CPK x     /CKMB x     /CKMB Units x        troponin 15 ng/L    CARDIAC MARKERS ( 16 Jul 2022 15:45 )  CPK x     /CKMB x     /CKMB Units x        troponin 15 ng/L      MICROBIOLOGY:     Respiratory Viral Panel with COVID-19 by NICKIE (07.16.22 @ 15:45)   Rapid RVP Result: [facility]   SARS-CoV-2: [facility]   This Respiratory Panel uses polymerase chain reaction (PCR) to detect for   adenovirus; coronavirus (HKU1, NL63, 229E, OC43); human metapneumovirus   (hMPV); human enterovirus/rhinovirus (Entero/RV); influenza A; influenza   A/H1; influenza A/H3; influenza A/H1-2009; influenza B; parainfluenza   viruses 1, 2, 3, 4; respiratory syncytial virus; Mycoplasma pneumoniae;   Chlamydophila pneumoniae; and SARS-CoV-2.   Culture - Sputum . (07.08.22 @ 07:05)   Culture Results:   Normal Respiratory Maggi present       RADIOLOGY:  [x] Chest radiographs reviewed and interpreted by me    EXAM:  XR CHEST PORTABLE URGENT 1V                          PROCEDURE DATE:  07/16/2022      FINDINGS:  Lungs appear hyperaerated but are otherwise clear.  Heart size cannot be accurately assessed on this projection.  There is no pneumothorax or pleural effusion.  No acute bony abnormality.    IMPRESSION:  Clear lungs.    LAKE MEADOWS MD; Resident Radiologist  This document has been electronically signed.  JAVAN HUSSEIN MD; Attending Interventional Radiologist  This document has been electronically signed. Jul 17 2022 11:37AM  ---------------------------------------------------------------------------------------------------------------    EX[facility]  [facility]                          PROCEDURE DATE:  07/16/2022      FINDINGS:    LUNGS AND AIRWAYS: Patent central airways.  Emphysematous changes are   present in both lungs. Patchy  opacities in the right lower lobe.   Calcified granulomas in the left upper lobe measuring up to 3 mm..  PLEURA: Small right pleural effusion.  MEDIASTINUM AND KAMILAH: No lymphadenopathy.  VESSELS: Pulmonary arteries are normal in caliber. No filling defects are   noted..  HEART: Heart size is normal. No pericardial effusion.  CHEST WALL AND LOWER NECK: Within normal limits.  VISUALIZED UPPER ABDOMEN: Within normal limits.  BONES: Degenerative changes.    IMPRESSION:  No pulmonary embolism  Patchy opacities are noted in the right lower lobe are of uncertain   etiology. Follow-up CT scan is recommended in 3 months to ensure   resolution.    KINJAL KAUR MD; Resident Radiologist  This document has been electronically signed.  CHRISTIAN MONTGOMERY MD; Attending Radiologist  This document has been electronically signed. Jul 17 2022  9:24AM  ---------------------------------------------------------------------------------------------------------------  EXAM:  XR CHEST PORTABLE URGENT 1V                          PROCEDURE DATE:  07/12/2022      FINDINGS:    The lungs are hyperaerated clear.    Heart size is within normal limits.    No acute osseous findings.      IMPRESSION:    Clear lungs.    ALEXANDRIA LANGE MD; Resident Radiology  This document has been electronically signed.  GIA VAZQUEZ MD; Attending Radiologist  This document has been electronically signed. Jul 12 2022  9:42PM  ---------------------------------------------------------------------------------------------------------------  EXAM:  XR CHEST PORTABLE URGENT 1V                          PROCEDURE DATE:  07/07/2022        FINDINGS:    Interval placement of endotracheal tube which terminates in the mid   trachea.  Cardiac size is within normal limits.  The lungs are hyperaerated, but clear.  There are no pleural effusions. No pneumothorax.  Degenerative osseous changes.    IMPRESSION:  Endotracheal tube terminates mid trachea.    VEENA DESAI MD; Resident Radiology  This document has been electronically signed.  GIA VAZQUEZ MD; Attending Radiologist  This documenthas been electronically signed. Jul 7 2022 12:37PM  ---------------------------------------------------------------------------------------------------------------  EXAM:  XR CHEST PORTABLE URGENT 1V                          PROCEDURE DATE:  07/07/2022      FINDINGS:    The lungs are hyperaerated, but clear.  No pleural effusion or pneumothorax.  Heart size is within normal limits.  No acute abnormality within visible osseous structures.      IMPRESSION:    Clear lungs.    NEHA DOUGLAS MD; Resident Radiology  This document has been electronically signed.  GIA VAZQUEZ MD; Attending Radiologist  This document has been electronically signed. Jul 7 2022 11:46AM  ---------------------------------------------------------------------------------------------------------------  EXAM:  DUPLEX SCAN EXT VEINS LOWER BI                          PROCEDURE DATE:  07/11/2022      IMPRESSION:  No evidence of acute deep venous thrombosis in either lower extremity.  Peripheral echogenicity along the left gastrocnemius vein may be sequela   of old deep venous thrombosis.    JOEL RANDLE MD; Attending Radiologist  This document has been electronically signed. Jul 11 2022  6:06PM  ---------------------------------------------------------------------------------------------------------------     [facility] - PROGRESS NOTE    CHIEF COMPLAINT: acute on chronic hypoxic respiratory failure; acute hypercapnic respiratory failure; COPD; emphysema    INTERVAL HISTORY: remains off NIV after a returning two days after discharge with acute on chronic hypoxic/hypercapnic respiratory failure; patient had been discharged with home oxygen; he had no evidence of significant oropharyngeal dysphagia on swallowing evaluation but continued on a puree diet with thin liquids; esophagram -> prominent aortic knob demonstrates mass effect upon the proximal esophagus - small hiatal hernia with nonobstructing Schatzki's ring at the level of the GE junction - moderate gastroesophageal reflux - abnormal esophageal motility -> patient and his son were told to arrange follow-up with a GI motility specialist; awake and alert - another bedside swallowing evaluation is underway; no shortness of breath or hypoxemia on a 2lpm nasal canula; no cough, sputum production, hemoptysis, chest congestion or wheeze; no fevers, chills or sweats; no chest pain/pressure or palpitations;    REVIEW OF SYSTEMS:  Constitutional: As per interval history  HEENT: Within normal limits  CV: As per interval history  Resp: As per interval history  GI: esophageal dysphagia  : Within normal limits  Musculoskeletal: Within normal limits  Skin: Within normal limits  Neurological: Within normal limits  Psychiatric: Within normal limits  Endocrine: Within normal limits  Hematologic/Lymphatic: Within normal limits  Allergic/Immunologic: Within normal limits      MEDICATIONS:     Pulmonary "  albuterol/ipratropium for Nebulization 3 milliLiter(s) Nebulizer every 6 hours  buDESOnide    Inhalation Suspension 0.5 milliGRAM(s) Inhalation two times a day  tiotropium 18 MICROgram(s) Capsule 1 Capsule(s) Inhalation daily    Anti-microbials:  piperacillin/tazobactam IVPB.. 3.375 Gram(s) IV Intermittent every 8 hours    Cardiovascular:  amLODIPine   Tablet 10 milliGRAM(s) Oral daily    Other:  aspirin  chewable 81 milliGRAM(s) Oral daily  atorvastatin 40 milliGRAM(s) Oral at bedtime  enoxaparin Injectable 40 milliGRAM(s) SubCutaneous every 24 hours  lactated ringers. 1000 milliLiter(s) IV Continuous <Continuous>  pantoprazole    Tablet 40 milliGRAM(s) Oral before breakfast  predniSONE   Tablet 40 milliGRAM(s) Oral daily    MEDICATIONS  (PRN):  acetaminophen     Tablet .. 650 milliGRAM(s) Oral every 6 hours PRN Temp greater or equal to 38C (100.4F), Mild Pain (1 - 3), Moderate Pain (4 - 6)    OBJECTIVE:    PHYSICAL EXAM:       ICU Vital Signs Last 24 Hrs  T(C): 36.6 (17 Jul 2022 19:20), Max: 36.7 (17 Jul 2022 12:32)  T(F): 97.9 (17 Jul 2022 19:20), Max: 98 (17 Jul 2022 12:32)  HR: 75 (17 Jul 2022 19:20) (67 - 102)  BP: 110/61 (17 Jul 2022 19:20) (100/49 - 112/71)  BP(mean): 84 (16 Jul 2022 22:45) (84 - 84)  ABP: --  ABP(mean): --  RR: 18 (17 Jul 2022 19:20) (18 - 22)  SpO2: 98% (17 Jul 2022 19:20) (98% - 100%) on 2lpm nasal canula @ rest     General: Awake. Alert. Cooperative. No distress. Appears stated age. Weak. Frail. Cachectic	  HEENT: Atraumatic. Bitemporal wasting. Anicteric. Normal oral mucosa. PERRL. EOMI.  Neck: Supple. Trachea midline. Thyroid without enlargement/tenderness/nodules. No carotid bruit. No JVD. Loss of bilateral supraclavicular fat pads.	  Cardiovascular: Regular rate and rhythm. S1 S2 normal. No murmurs, rubs or gallops.  Respiratory: Respirations unlabored. Decreased breath sounds throughout. Basilar rales right > left. No wheeze. Kyphosis.  Abdomen: Soft. Non-tender. Non-distended. No organomegaly. No masses. Normal bowel sounds.  Extremities: Warm to touch. No clubbing or cyanosis. No pedal edema.  Pulses: 2+ peripheral pulses all extremities.	  Skin: Normal skin color. No rashes or lesions. No ecchymoses. No cyanosis. Warm to touch.  Lymph Nodes: Cervical, supraclavicular and axillary nodes normal  Neurological: Motor and sensory examination equal and normal. A and O x 3  Psychiatry: Appropriate mood and affect      LABS:                          15.6   15.80 )-----------( 287      ( 16 Jul 2022 15:45 )             47.3     CBC    WBC  15.80 <==, 8.58 <==, 14.55 <==, 7.73 <==    Hemoglobin  15.6 <<==, 13.4 <<==, 15.0 <<==, 14.7 <<==    Hematocrit  47.3 <==, 41.3 <==, 45.4 <==, 45.0 <==    Platelets  287 <==, 202 <==, 246 <==, 218 <==      138  |  89<L>  |  18  ----------------------------<  137<H>    07-16  4.8   |  32<H>  |  0.63      LYTES    sodium  138 <==, 138 <==, 133 <==, 137 <==    potassium   4.8 <==, 4.6 <==, 4.9 <==, 4.6 <==    chloride  89 <==, 90 <==, 88 <==, 92 <==    carbon dioxide  32 <==, 34 <==, 34 <==, 36 <==    =============================================================================================  RENAL FUNCTION:    Creatinine:   0.63  <<==, 0.67  <<==, 0.72  <<==, 0.68  <<==    BUN:   18 <==, 13 <==, 13 <==, 12 <==    ============================================================================================    calcium   9.6 <==, 9.5 <==, 9.4 <==, 9.7 <==    phos   3.1 <==, 2.8 <==, 2.2 <==    mag   1.8 <==, 1.9 <==, 1.6 <==    ============================================================================================  LFTs    AST:   34 <==     ALT:  24  <==     AP:  69  <=    Bili:  1.2  <=    PT/INR - ( 16 Jul 2022 15:45 )   PT: 12.3 sec;   INR: 1.07 ratio      PTT - ( 16 Jul 2022 15:45 )  PTT:29.7 sec    Venous Blood Gas:  07-17 @ 06:00  7.35/74/39/41/60.5  VBG Lactate: 1.9    ABG - ( 16 Jul 2022 15:32 )  pH: 7.45  /  pCO2: 62    /  pO2: 40    / HCO3: 43    / Base Excess: 15.5  /  SaO2: 63.5      Blood Gas Profile - Arterial (07.08.22 @ 00:30)   pH, Arterial: 7.51   pCO2, Arterial: 40 mmHg   pO2, Arterial: 139 mmHg   HCO3, Arterial: 32 mmol/L   Base Excess, Arterial: 8.2 mmol/L   Oxygen Saturation, Arterial: 99.6 %   Total CO2, Arterial: 33 mmol/L   FIO2, Arterial: 30.0   Blood Gas Source Arterial: Arterial   ---------------------------------------------------------------------------------------------------------------  Blood Gas Profile - Arterial (07.07.22 @ 13:12)   pH, Arterial: 7.38   pCO2, Arterial: 52 mmHg   pO2, Arterial: 234 mmHg   HCO3, Arterial: 31 mmol/L   Base Excess, Arterial: 4.3 mmol/L   Oxygen Saturation, Arterial: 100.0 %   Total CO2, Arterial: 32 mmol/L   FIO2, Arterial: 50   Blood Gas Source Arterial: Arterial     Blood Gas Profile - Venous (07.07.22 @ 08:10)   pH, Venous: 7.18   pCO2, Venous: 108 mmHg   pO2, Venous: 55 mmHg   HCO3, Venous: 40 mmol/L   Base Excess, Venous: 8.2 mmol/L   Oxygen Saturation, Venous: 81.2 %   Total CO2, Venous: 44 mmol/L   Blood Gas Source Venous: Venous       Procalcitonin, Serum: 0.06 ng/mL (07-16 @ 15:45)    Serum Pro-Brain Natriuretic Peptide: 374 pg/mL (07-16 @ 15:45)    CARDIAC MARKERS ( 16 Jul 2022 23:33 )  CPK x     /CKMB x     /CKMB Units x        troponin 15 ng/L    CARDIAC MARKERS ( 16 Jul 2022 15:45 )  CPK x     /CKMB x     /CKMB Units x        troponin 15 ng/L      MICROBIOLOGY:     Respiratory Viral Panel with COVID-19 by NICKIE (07.16.22 @ 15:45)   Rapid RVP Result: [facility]   SARS-CoV-2: [facility]   This Respiratory Panel uses polymerase chain reaction (PCR) to detect for   adenovirus; coronavirus (HKU1, NL63, 229E, OC43); human metapneumovirus   (hMPV); human enterovirus/rhinovirus (Entero/RV); influenza A; influenza   A/H1; influenza A/H3; influenza A/H1-2009; influenza B; parainfluenza   viruses 1, 2, 3, 4; respiratory syncytial virus; Mycoplasma pneumoniae;   Chlamydophila pneumoniae; and SARS-CoV-2.     Culture - Sputum . (07.08.22 @ 07:05)   Culture Results:   Normal Respiratory Maggi present       RADIOLOGY:  [x] Chest radiographs reviewed and interpreted by me    EXAM:  XR CHEST PORTABLE URGENT 1V                          PROCEDURE DATE:  07/16/2022      FINDINGS:  Lungs appear hyperaerated but are otherwise clear.  Heart size cannot be accurately assessed on this projection.  There is no pneumothorax or pleural effusion.  No acute bony abnormality.    IMPRESSION:  Clear lungs.    LAKE MEADOWS MD; Resident Radiologist  This document has been electronically signed.  JAVAN HUSSEIN MD; Attending Interventional Radiologist  This document has been electronically signed. Jul 17 2022 11:37AM  ---------------------------------------------------------------------------------------------------------------    [facility]                          PROCEDURE DATE:  07/16/2022      FINDINGS:    LUNGS AND AIRWAYS: Patent central airways.  Emphysematous changes are   present in both lungs. Patchy  opacities in the right lower lobe.   Calcified granulomas in the left upper lobe measuring up to 3 mm..  PLEURA: Small right pleural effusion.  MEDIASTINUM AND KAMILAH: No lymphadenopathy.  VESSELS: Pulmonary arteries are normal in caliber. No filling defects are   noted..  HEART: Heart size is normal. No pericardial effusion.  CHEST WALL AND LOWER NECK: Within normal limits.  VISUALIZED UPPER ABDOMEN: Within normal limits.  BONES: Degenerative changes.    IMPRESSION:  No pulmonary embolism  Patchy opacities are noted in the right lower lobe are of uncertain   etiology. Follow-up CT scan is recommended in 3 months to ensure   resolution.    KINJAL KAUR MD; Resident Radiologist  This document has been electronically signed.  CHRISTIAN MONTGOMERY MD; Attending Radiologist  This document has been electronically signed. Jul 17 2022  9:24AM  ---------------------------------------------------------------------------------------------------------------  EXAM:  XR CHEST PORTABLE URGENT 1V                          PROCEDURE DATE:  07/12/2022      FINDINGS:    The lungs are hyperaerated clear.    Heart size is within normal limits.    No acute osseous findings.      IMPRESSION:    Clear lungs.    ALEXANDRIA LANGE MD; Resident Radiology  This document has been electronically signed.  GIA VAZQUEZ MD; Attending Radiologist  This document has been electronically signed. Jul 12 2022  9:42PM  ---------------------------------------------------------------------------------------------------------------  EXAM:  XR CHEST PORTABLE URGENT 1V                          PROCEDURE DATE:  07/07/2022        FINDINGS:    Interval placement of endotracheal tube which terminates in the mid   trachea.  Cardiac size is within normal limits.  The lungs are hyperaerated, but clear.  There are no pleural effusions. No pneumothorax.  Degenerative osseous changes.    IMPRESSION:  Endotracheal tube terminates mid trachea.    VEENA DESAI MD; Resident Radiology  This document has been electronically signed.  GIA VAZQUEZ MD; Attending Radiologist  This documenthas been electronically signed. Jul 7 2022 12:37PM  ---------------------------------------------------------------------------------------------------------------  EXAM:  XR CHEST PORTABLE URGENT 1V                          PROCEDURE DATE:  07/07/2022      FINDINGS:    The lungs are hyperaerated, but clear.  No pleural effusion or pneumothorax.  Heart size is within normal limits.  No acute abnormality within visible osseous structures.      IMPRESSION:    Clear lungs.    NEHA DOUGLAS MD; Resident Radiology  This document has been electronically signed.  GIA VAZQUEZ MD; Attending Radiologist  This document has been electronically signed. Jul 7 2022 11:46AM  ---------------------------------------------------------------------------------------------------------------  EXAM:  DUPLEX SCAN EXT VEINS LOWER BI                          PROCEDURE DATE:  07/11/2022      IMPRESSION:  No evidence of acute deep venous thrombosis in either lower extremity.  Peripheral echogenicity along the left gastrocnemius vein may be sequela   of old deep venous thrombosis.    JOEL RANDLE MD; Attending Radiologist  This document has been electronically signed. Jul 11 2022  6:06PM  ---------------------------------------------------------------------------------------------------------------

## 2022-07-17 NOTE — PROVIDER CONTACT NOTE (OTHER) - ACTION/TREATMENT ORDERED:
Provider made aware. As per provider, medication to be rescheduled to 0800 at this time. Will try to trial patient off BIPAP at that time. Will continue to monitor at this time

## 2022-07-17 NOTE — PROGRESS NOTE ADULT - ASSESSMENT
ASSESSMENT:    readmitted with acute on chronic hypoxic/hypercapnic respiratory failure felt to be due to a COPD exacerbation requiring treatment with NIV    recent admission for a COPD exacerbation following an aspiration event requiring a brief intubation for acute hypercapnic respiratory failure     Esophageal dysphagia s/p EGD for food impaction in the distal esophagus    HTN/HLD/CAD    PLAN/RECOMMENDATIONS:    oxygen supplementation to keep saturation greater than 92% - currently on a 2lpm nasal canula   the patient has home oxygen   he may benefit from a NIV device for sleep  CT scan reviewed -> patent central airways - emphysema - patchy  opacities in the right lower lobe I suspect represent resolving aspiration pneumonia from last week  prednisone 40mg daily x 5 days  albuterol/atrovent nebs q6h  pulmicort 0.5mg nebs q12h - give after duoneb - rinse mouth after use  discontinue spiriva inhaler which is redundant  discharge on his usual Brovana and Yupelri nebulizers and albuterol MDI as needed  encouraging secretion clearance, pulmonary toilet, etc  GI evaluation    esophagram -> prominent aortic knob demonstrates mass effect upon the proximal esophagus - small hiatal hernia with nonobstructing Schatzki's ring at the level of the GE junction - moderate gastroesophageal reflux - abnormal esophageal motility    perhaps the patient should see a GI motility specialist in the inpatient setting  aspiration precautions    head of bed elevation at 90 degrees when eating    puree diet with thin liquids     small bites - no straws - chin tuck maneuver  cardiac meds: ASA/lipitor/norvasc  GI/DVT prophylaxis - protonix/SQ lovenox  out of bed and into the chair  ambulate as able    Will follow with you. Plan of care discussed with the patient and the speech and swallow team at bedside.    Aditya Simon MD, [facility]  [phone]  Pulmonary Medicine

## 2022-07-17 NOTE — PROGRESS NOTE ADULT - ASSESSMENT
87M with PMH of COPD, HTN, HLD, recent admission for copd exacerbation presents to the ED with shortness of breath found to be in acute hypoxic and hypercarbic respiratory failure.

## 2022-07-17 NOTE — PROGRESS NOTE ADULT - SUBJECTIVE AND OBJECTIVE BOX
Pantera Wiley MD  Division of Hospital Medicine  Available via MS teams  If no response or off hours page: [phone]  ---------------------------------------------------------    KELSIE TRUJILLO  87y  Male      Patient is a 87y old  Male who presents with a chief complaint of hypoxia (16 Jul 2022 21:44)      INTERVAL HPI/OVERNIGHT EVENTS:  Seen at bedside. Back on 2L NC. Tired of being in hospital      REVIEW OF SYSTEMS: 10 point ROS negative unless listed above    T(C): 36.5 (07-17-22 @ 08:13), Max: 37.2 (07-16-22 @ 15:14)  HR: 75 (07-17-22 @ 08:43) (72 - 110)  BP: 111/57 (07-17-22 @ 10:08) (100/53 - 138/94)  RR: 20 (07-17-22 @ 10:08) (19 - 28)  SpO2: 100% (07-17-22 @ 10:08) (96% - 100%)  Wt(kg): --Vital Signs Last 24 Hrs  T(C): 36.5 (17 Jul 2022 08:13), Max: 37.2 (16 Jul 2022 15:14)  T(F): 97.7 (17 Jul 2022 08:13), Max: 99 (16 Jul 2022 15:14)  HR: 75 (17 Jul 2022 08:43) (72 - 110)  BP: 111/57 (17 Jul 2022 10:08) (100/53 - 138/94)  BP(mean): 84 (16 Jul 2022 22:45) (81 - 84)  RR: 20 (17 Jul 2022 10:08) (19 - 28)  SpO2: 100% (17 Jul 2022 10:08) (96% - 100%)    Parameters below as of 17 Jul 2022 10:08  Patient On (Oxygen Delivery Method): nasal cannula        PHYSICAL EXAM:  GENERAL: NAD, well-groomed, well-developed  NECK: Supple, No JVD  CHEST/LUNG: Clear to auscultation bilaterally; No rales, rhonchi, wheezing, or rubs  HEART: Regular rate and rhythm; No murmurs, rubs, or gallops  ABDOMEN: Soft, Nontender, Nondistended; Bowel sounds present.    EXTREMITIES:  2+ Peripheral Pulses, No clubbing, cyanosis, or edema  PSYCH: Alert & Oriented x3    Consultant(s) Notes Reviewed:  [x ] YES  [ ] NO  Care Discussed with Consultants/Other Providers [ x] YES  [ ] NO    LABS:                        15.6   15.80 )-----------( 287      ( 16 Jul 2022 15:45 )             47.3     07-16    138  |  89<L>  |  18  ----------------------------<  137<H>  4.8   |  32<H>  |  0.63    Ca    9.6      16 Jul 2022 15:45    TPro  7.2  /  Alb  3.9  /  TBili  1.2  /  DBili  x   /  AST  34  /  ALT  24  /  AlkPhos  69  07-16    PT/INR - ( 16 Jul 2022 15:45 )   PT: 12.3 sec;   INR: 1.07 ratio         PTT - ( 16 Jul 2022 15:45 )  PTT:29.7 sec    CAPILLARY BLOOD GLUCOSE          ABG - ( 16 Jul 2022 15:32 )  pH, Arterial: 7.45  pH, Blood: x     /  pCO2: 62    /  pO2: 40    / HCO3: 43    / Base Excess: 15.5  /  SaO2: 63.5                  RADIOLOGY & ADDITIONAL TESTS:    Imaging Personally Reviewed:  [ ] YES  [ ] NO

## 2022-07-18 LAB — GLUCOSE BLDC GLUCOMTR-MCNC: 88 MG/DL — SIGNIFICANT CHANGE UP (ref 70–99)

## 2022-07-18 PROCEDURE — 99233 SBSQ HOSP IP/OBS HIGH 50: CPT

## 2022-07-18 RX ADMIN — TIOTROPIUM BROMIDE 1 CAPSULE(S): 18 CAPSULE ORAL; RESPIRATORY (INHALATION) at 11:55

## 2022-07-18 RX ADMIN — SODIUM CHLORIDE 60 MILLILITER(S): 9 INJECTION, SOLUTION INTRAVENOUS at 05:45

## 2022-07-18 RX ADMIN — Medication 40 MILLIGRAM(S): at 05:44

## 2022-07-18 RX ADMIN — PIPERACILLIN AND TAZOBACTAM 25 GRAM(S): 4; .5 INJECTION, POWDER, LYOPHILIZED, FOR SOLUTION INTRAVENOUS at 10:33

## 2022-07-18 RX ADMIN — ATORVASTATIN CALCIUM 40 MILLIGRAM(S): 80 TABLET, FILM COATED ORAL at 21:34

## 2022-07-18 RX ADMIN — AMLODIPINE BESYLATE 10 MILLIGRAM(S): 2.5 TABLET ORAL at 05:44

## 2022-07-18 RX ADMIN — Medication 3 MILLILITER(S): at 01:38

## 2022-07-18 RX ADMIN — Medication 3 MILLILITER(S): at 11:55

## 2022-07-18 RX ADMIN — Medication 3 MILLILITER(S): at 18:49

## 2022-07-18 RX ADMIN — PIPERACILLIN AND TAZOBACTAM 25 GRAM(S): 4; .5 INJECTION, POWDER, LYOPHILIZED, FOR SOLUTION INTRAVENOUS at 18:48

## 2022-07-18 RX ADMIN — Medication 0.5 MILLIGRAM(S): at 18:50

## 2022-07-18 RX ADMIN — PANTOPRAZOLE SODIUM 40 MILLIGRAM(S): 20 TABLET, DELAYED RELEASE ORAL at 05:49

## 2022-07-18 RX ADMIN — PIPERACILLIN AND TAZOBACTAM 25 GRAM(S): 4; .5 INJECTION, POWDER, LYOPHILIZED, FOR SOLUTION INTRAVENOUS at 01:38

## 2022-07-18 RX ADMIN — Medication 3 MILLILITER(S): at 05:45

## 2022-07-18 RX ADMIN — Medication 81 MILLIGRAM(S): at 11:56

## 2022-07-18 RX ADMIN — Medication 0.5 MILLIGRAM(S): at 05:45

## 2022-07-18 RX ADMIN — ENOXAPARIN SODIUM 40 MILLIGRAM(S): 100 INJECTION SUBCUTANEOUS at 05:50

## 2022-07-18 NOTE — SWALLOW BEDSIDE ASSESSMENT ADULT - PHARYNGEAL PHASE
multiple swallow x2-3 multiple swallows x2-3 multiple swallows x2-3; O2 sat to 88 following trials w/ return to 98 following cued cough and rest

## 2022-07-18 NOTE — SWALLOW BEDSIDE ASSESSMENT ADULT - SPECIFY REASON(S)
to subjectively assess swallow safety/function and r/o dyshagia to subjectively assess swallow safety/function and r/o dysphagia

## 2022-07-18 NOTE — SWALLOW BEDSIDE ASSESSMENT ADULT - NS ASR SWALLOW FINDINGS DISCUS
comfortable appearance/cooperative comfortable appearance/cooperative MORGAN Robledo/Nursing/Patient MORGAN Robledo and ACP SOTO Larsen/Nursing/Patient

## 2022-07-18 NOTE — SWALLOW BEDSIDE ASSESSMENT ADULT - SWALLOW EVAL: DIAGNOSIS
87M with PMH of COPD presents to the ED with hypoxia. Suspected to have aspiration PNA. Pt presents w/ a suspected pharyngeal dysphagia characterized by poor coordination of respiration/deglutition and O2 desat to 88 following trials of thin liquids. No overt s/s of laryngeal penetration/aspiration present on this exam. Pt required verbal cues to cough following period of O2 desaturation. Intermittent increased WOB noted throughout evaluation w/ Pt requiring frequent breaks. 87M with PMH of COPD presents to the ED with hypoxia. Suspected to have aspiration PNA. Pt presents w/ a suspected pharyngeal dysphagia characterized by multiple swallows x2-3 on all consistencies and O2 desat to 88 following trials of thin liquids which may be indicative of silent aspiration. No overt s/s of laryngeal penetration/aspiration present on this exam. Pt required verbal cues to cough following period of O2 desaturation. Intermittent increased WOB noted throughout evaluation w/ Pt requiring frequent breaks. This may be indicative of difficulty coordinating respiration/deglutition and respiration/speech.

## 2022-07-18 NOTE — SWALLOW BEDSIDE ASSESSMENT ADULT - ASR SWALLOW REFERRAL
Consider GI workup 2/2 hx of esophageal dysphagia Consider GI workup 2/2 hx of esophageal dysphagia and unable to f/u w/ GI motility specialist as outpatient

## 2022-07-18 NOTE — SWALLOW BEDSIDE ASSESSMENT ADULT - SLP PERTINENT HISTORY OF CURRENT PROBLEM
87M with PMH of COPD, HTN, HLD presents to the ED with hypoxia. Patient was recently admitted to the hospital for COPD exacerbation, briefly intubated for hypercarbic respiratory failure requiring MICU stay, and discharged home. Patient was discharged home on 2L nasal cannula. Patient was found to be hypoxic to the 70s-80s on room air by visiting nurse and sent to the ED. Of note, Pt w/ leukocytosis. As per H&P, 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, small hiatal hernia with nonobstructed Schatzki's ring at the level of the GE junction, mod gastroesophageal reflux, abnormal esophageal motility. Recommendation for NPO pending S/S evaluation and GI f/u. 87M with PMH of COPD, HTN, HLD presents to the ED with hypoxia. Patient was found to be hypoxic to the 70s-80s on room air by visiting nurse and sent to the ED. Of note, Pt w/ leukocytosis. Recommendation for NPO pending S/S evaluation and GI f/u. Pulmonary consulted for hypoxia and impression reports he does have a right lower lobe patchy opacity which may represent aspiration pneumonia, patient was intubated about 10 days ago and can be an aspiration pneumonia. Pulmonary recommendation to continue supplemental O2, continue zosyn/steroids, would have GI and speech/swallow evaluate again, aspiration precautions.

## 2022-07-18 NOTE — PROGRESS NOTE ADULT - SUBJECTIVE AND OBJECTIVE BOX
[facility] Division of Hospital Medicine  Sunshine Vigil DO   Available via Microsoft Teams      Patient is a 87y old  Male who presents with a chief complaint of hypoxia (17 Jul 2022 13:21)      SUBJECTIVE / OVERNIGHT EVENTS:  Not very interactive. Arousable from sleep but wished to be left alone  - Shook head no to pain or SOB     MEDICATIONS  (STANDING):  albuterol/ipratropium for Nebulization 3 milliLiter(s) Nebulizer every 6 hours  amLODIPine   Tablet 10 milliGRAM(s) Oral daily  aspirin  chewable 81 milliGRAM(s) Oral daily  atorvastatin 40 milliGRAM(s) Oral at bedtime  buDESOnide    Inhalation Suspension 0.5 milliGRAM(s) Inhalation two times a day  enoxaparin Injectable 40 milliGRAM(s) SubCutaneous every 24 hours  pantoprazole    Tablet 40 milliGRAM(s) Oral before breakfast  piperacillin/tazobactam IVPB.. 3.375 Gram(s) IV Intermittent every 8 hours  predniSONE   Tablet 40 milliGRAM(s) Oral daily  tiotropium 18 MICROgram(s) Capsule 1 Capsule(s) Inhalation daily    MEDICATIONS  (PRN):  acetaminophen     Tablet .. 650 milliGRAM(s) Oral every 6 hours PRN Temp greater or equal to 38C (100.4F), Mild Pain (1 - 3), Moderate Pain (4 - 6)      CAPILLARY BLOOD GLUCOSE        I&O's Summary    17 Jul 2022 07:01  -  18 Jul 2022 07:00  --------------------------------------------------------  IN: 600 mL / OUT: 0 mL / NET: 600 mL    18 Jul 2022 07:01  -  18 Jul 2022 15:12  --------------------------------------------------------  IN: 0 mL / OUT: 25 mL / NET: -25 mL        PHYSICAL EXAM:  Vital Signs Last 24 Hrs  T(C): 36.9 (18 Jul 2022 13:24), Max: 36.9 (18 Jul 2022 13:24)  T(F): 98.4 (18 Jul 2022 13:24), Max: 98.4 (18 Jul 2022 13:24)  HR: 63 (18 Jul 2022 13:24) (59 - 83)  BP: 116/61 (18 Jul 2022 13:24) (106/59 - 122/62)  BP(mean): --  RR: 18 (18 Jul 2022 13:24) (18 - 20)  SpO2: 96% (18 Jul 2022 13:24) (96% - 100%)    Parameters below as of 18 Jul 2022 13:24  Patient On (Oxygen Delivery Method): nasal cannula  O2 Flow (L/min): 2    GENERAL: NAD, well-groomed, thin  CHEST/LUNG: Clear to auscultation bilaterally; No rales, rhonchi, wheezing, or rubs  HEART: Regular rate and rhythm; No murmurs, rubs, or gallops. No LE edema   ABDOMEN: Soft, Nontender, Nondistended; Bowel sounds present.    EXTREMITIES:  2+ Peripheral Pulses, No clubbing, cyanosis, or edema  PSYCH: Alert & Oriented x3    LABS:                        15.6   15.80 )-----------( 287      ( 16 Jul 2022 15:45 )             47.3     07-16    138  |  89<L>  |  18  ----------------------------<  137<H>  4.8   |  32<H>  |  0.63    Ca    9.6      16 Jul 2022 15:45    TPro  7.2  /  Alb  3.9  /  TBili  1.2  /  DBili  x   /  AST  34  /  ALT  24  /  AlkPhos  69  07-16    PT/INR - ( 16 Jul 2022 15:45 )   PT: 12.3 sec;   INR: 1.07 ratio         PTT - ( 16 Jul 2022 15:45 )  PTT:29.7 sec            RADIOLOGY & ADDITIONAL TESTS:  Results Reviewed:   Imaging Personally Reviewed:  Electrocardiogram Personally Reviewed:    COORDINATION OF CARE:  Care Discussed with Consultants/Other Providers [Y/N]:  Prior or Outpatient Records Reviewed [Y/N]:

## 2022-07-18 NOTE — SWALLOW BEDSIDE ASSESSMENT ADULT - SLP GENERAL OBSERVATIONS
Encountered Pt sitting upright in bed. +2L O2 via NC. Pt A&Ox3 and verbally responsive throughout exam. Able to follow commands for the purpose of this evaluation. Pt noted w/ intermittently increased WOB, benefitted from cues to take deep breath through the nose and out through the mouth.  Monitor not reading O2 sats and heart rate. MORGAN Robledo made aware.

## 2022-07-18 NOTE — SWALLOW BEDSIDE ASSESSMENT ADULT - DIET PRIOR TO ADMI
Recommended diet of puree/ thin liquids on d/c last admission (7/15/22) and f/u w/ GI motility specialist

## 2022-07-18 NOTE — SWALLOW BEDSIDE ASSESSMENT ADULT - COMMENTS
Hx continued:  Pulmonary consulted for hypoxia and impression reports he does have a right lower lobe patchy opacity which may represent aspiration pneumonia, patient was intubated about 10 days ago and can be an aspiration pneumonia. Pulmonary recommendation to continue supplemental O2, continue zosyn/steroids, would have GI and speech/swallow evaluate again, aspiration precautions, puree diet/thin liquids.     IMAGIN/16 CXR: IMPRESSION: Clear lungs.   CT Chest: IMPRESSION: No pulmonary embolism. Patchy opacities are noted in the right lower lobe are of uncertain etiology. Follow-up CT scan is recommended in 3 months to ensure resolution. Hx continued:  Pulmonary consulted for hypoxia and impression reports he does have a right lower lobe patchy opacity which may represent aspiration pneumonia, patient was intubated about 10 days ago and can be an aspiration pneumonia. Pulmonary recommendation to continue supplemental O2, continue zosyn/steroids, would have GI and speech/swallow evaluate again, aspiration precautions, puree diet/thin liquids.     IMAGIN/16 CXR: IMPRESSION: Clear lungs.   CT Chest: IMPRESSION: No pulmonary embolism. Patchy opacities are noted in the right lower lobe are of uncertain etiology. Follow-up CT scan is recommended in 3 months to ensure resolution.    *Patient known to this service from recent hospitalization w/ MBS performed on 22. Recommendation to continue clear liquid diet w/ upgrade to puree/thin pending GI recommendations. Upgrade solids at bedside when cleared by GI. See MBS note in documents for full report. Hx continued:  Previous hospitalization: Patient was recently admitted to the hospital for COPD exacerbation, briefly intubated for hypercarbic respiratory failure requiring MICU stay. Barium esophagram  with Prominent aortic knob demonstrates mass effect upon the proximal esophagus. Small hiatal hernia with non-obstructed Schatzki's ring at the level of the GE junction. Moderate gastroesophageal reflux. Abnormal esophageal motility. GI recommendation to continue with soft diet, avoid solid food as he is high risk for aspiration, aspiration precautions, sit upright during and after eating/drinking, follow up with Motility specialist as o/p.  Pt discharged home on 2L O2.   *Patient known to this service from recent hospitalization w/ MBS performed on 22. Recommendation to continue clear liquid diet w/ upgrade to puree/thin pending GI recommendations. Upgrade solids at bedside when cleared by GI. See MBS note in documents for full report.    IMAGIN/16 CXR: IMPRESSION: Clear lungs.   CT Chest: IMPRESSION: No pulmonary embolism. Patchy opacities are noted in the right lower lobe are of uncertain etiology. Follow-up CT scan is recommended in 3 months to ensure resolution.

## 2022-07-18 NOTE — PATIENT PROFILE ADULT - FALL HARM RISK - HARM RISK INTERVENTIONS

## 2022-07-19 ENCOUNTER — TRANSCRIPTION ENCOUNTER (OUTPATIENT)
Age: 87
End: 2022-07-19

## 2022-07-19 LAB
GAS PNL BLDV: SIGNIFICANT CHANGE UP
HCT VFR BLD CALC: 37.7 % — LOW (ref 39–50)
HGB BLD-MCNC: 12.6 G/DL — LOW (ref 13–17)
MCHC RBC-ENTMCNC: 32.3 PG — SIGNIFICANT CHANGE UP (ref 27–34)
MCHC RBC-ENTMCNC: 33.4 GM/DL — SIGNIFICANT CHANGE UP (ref 32–36)
MCV RBC AUTO: 96.7 FL — SIGNIFICANT CHANGE UP (ref 80–100)
NRBC # BLD: 0 /100 WBCS — SIGNIFICANT CHANGE UP (ref 0–0)
PLATELET # BLD AUTO: 237 K/UL — SIGNIFICANT CHANGE UP (ref 150–400)
RBC # BLD: 3.9 M/UL — LOW (ref 4.2–5.8)
RBC # FLD: 12.6 % — SIGNIFICANT CHANGE UP (ref 10.3–14.5)
WBC # BLD: 13.23 K/UL — HIGH (ref 3.8–10.5)
WBC # FLD AUTO: 13.23 K/UL — HIGH (ref 3.8–10.5)

## 2022-07-19 PROCEDURE — 99232 SBSQ HOSP IP/OBS MODERATE 35: CPT

## 2022-07-19 PROCEDURE — 74230 X-RAY XM SWLNG FUNCJ C+: CPT | Mod: 26

## 2022-07-19 RX ADMIN — PIPERACILLIN AND TAZOBACTAM 25 GRAM(S): 4; .5 INJECTION, POWDER, LYOPHILIZED, FOR SOLUTION INTRAVENOUS at 13:36

## 2022-07-19 RX ADMIN — ATORVASTATIN CALCIUM 40 MILLIGRAM(S): 80 TABLET, FILM COATED ORAL at 21:00

## 2022-07-19 RX ADMIN — ENOXAPARIN SODIUM 40 MILLIGRAM(S): 100 INJECTION SUBCUTANEOUS at 05:55

## 2022-07-19 RX ADMIN — Medication 0.5 MILLIGRAM(S): at 05:52

## 2022-07-19 RX ADMIN — PIPERACILLIN AND TAZOBACTAM 25 GRAM(S): 4; .5 INJECTION, POWDER, LYOPHILIZED, FOR SOLUTION INTRAVENOUS at 21:00

## 2022-07-19 RX ADMIN — TIOTROPIUM BROMIDE 1 CAPSULE(S): 18 CAPSULE ORAL; RESPIRATORY (INHALATION) at 13:36

## 2022-07-19 RX ADMIN — PANTOPRAZOLE SODIUM 40 MILLIGRAM(S): 20 TABLET, DELAYED RELEASE ORAL at 05:55

## 2022-07-19 RX ADMIN — Medication 40 MILLIGRAM(S): at 05:53

## 2022-07-19 RX ADMIN — Medication 3 MILLILITER(S): at 05:52

## 2022-07-19 RX ADMIN — PIPERACILLIN AND TAZOBACTAM 25 GRAM(S): 4; .5 INJECTION, POWDER, LYOPHILIZED, FOR SOLUTION INTRAVENOUS at 05:52

## 2022-07-19 RX ADMIN — Medication 3 MILLILITER(S): at 13:36

## 2022-07-19 RX ADMIN — Medication 0.5 MILLIGRAM(S): at 17:41

## 2022-07-19 RX ADMIN — Medication 3 MILLILITER(S): at 17:41

## 2022-07-19 RX ADMIN — Medication 3 MILLILITER(S): at 00:03

## 2022-07-19 RX ADMIN — AMLODIPINE BESYLATE 10 MILLIGRAM(S): 2.5 TABLET ORAL at 05:53

## 2022-07-19 RX ADMIN — Medication 81 MILLIGRAM(S): at 13:37

## 2022-07-19 NOTE — SWALLOW VFSS/MBS ASSESSMENT ADULT - RESIDUE IN VALLECULAE
spillover of residue along AE folds to the pyriforms/Moderate uncontrolled spillover over the superior surface of the epiglottis/Moderate Mild

## 2022-07-19 NOTE — DISCHARGE NOTE PROVIDER - NSDCFUADDAPPT_GEN_ALL_CORE_FT
APPTS ARE READY TO BE MADE: [x ] YES    Best Family or Patient Contact (if needed):    Additional Information about above appointments (if needed):    1: Dr. Biggs for motility study  2:   3:     Other comments or requests:          APPTS ARE READY TO BE MADE: [x ] YES    Best Family or Patient Contact (if needed):    Additional Information about above appointments (if needed):    1: Dr. Biggs for motility study  2: Patient was provided with (doctors name and information) and was advised to call to schedule follow up within specified time frame. At this time patient declined scheduling assistance.  3: Patient was provided with (doctors name and information) and was advised to call to schedule follow up within specified time frame. At this time patient declined scheduling assistance.  4: Patient was provided with (doctors name and information) and was advised to call to schedule follow up within specified time frame. At this time patient declined scheduling assistance.    Other comments or requests:

## 2022-07-19 NOTE — PHYSICAL THERAPY INITIAL EVALUATION ADULT - PLANNED THERAPY INTERVENTIONS, PT EVAL
GOAL: Pt will negotiate up/down 1 flight of steps with one handrail ascending independently in 2 weeks/balance training/bed mobility training/gait training/strengthening/transfer training

## 2022-07-19 NOTE — PHYSICAL THERAPY INITIAL EVALUATION ADULT - TRANSFER TRAINING, PT EVAL
GOAL: Patient will perform sit to stand transfers independently at rolling walker with proper hand placement

## 2022-07-19 NOTE — PROGRESS NOTE ADULT - SUBJECTIVE AND OBJECTIVE BOX
[facility] Division of Hospital Medicine  Sunshine Vigil DO   Available via Microsoft Teams      Patient is a 87y old  Male who presents with a chief complaint of hypoxia (19 Jul 2022 10:35)      SUBJECTIVE / OVERNIGHT EVENTS:  Patient without acute complaints this AM.   no SOB, CP, dizziness     MEDICATIONS  (STANDING):  albuterol/ipratropium for Nebulization 3 milliLiter(s) Nebulizer every 6 hours  amLODIPine   Tablet 10 milliGRAM(s) Oral daily  aspirin  chewable 81 milliGRAM(s) Oral daily  atorvastatin 40 milliGRAM(s) Oral at bedtime  buDESOnide    Inhalation Suspension 0.5 milliGRAM(s) Inhalation two times a day  enoxaparin Injectable 40 milliGRAM(s) SubCutaneous every 24 hours  pantoprazole    Tablet 40 milliGRAM(s) Oral before breakfast  piperacillin/tazobactam IVPB.. 3.375 Gram(s) IV Intermittent every 8 hours  predniSONE   Tablet 40 milliGRAM(s) Oral daily  tiotropium 18 MICROgram(s) Capsule 1 Capsule(s) Inhalation daily    MEDICATIONS  (PRN):  acetaminophen     Tablet .. 650 milliGRAM(s) Oral every 6 hours PRN Temp greater or equal to 38C (100.4F), Mild Pain (1 - 3), Moderate Pain (4 - 6)      CAPILLARY BLOOD GLUCOSE        I&O's Summary    18 Jul 2022 07:01  -  19 Jul 2022 07:00  --------------------------------------------------------  IN: 460 mL / OUT: 1000 mL / NET: -540 mL    19 Jul 2022 07:01  -  19 Jul 2022 15:22  --------------------------------------------------------  IN: 480 mL / OUT: 250 mL / NET: 230 mL        PHYSICAL EXAM:  Vital Signs Last 24 Hrs  T(C): 36.7 (19 Jul 2022 13:03), Max: 36.7 (19 Jul 2022 13:03)  T(F): 98 (19 Jul 2022 13:03), Max: 98 (19 Jul 2022 13:03)  HR: 84 (19 Jul 2022 13:03) (55 - 88)  BP: 122/70 (19 Jul 2022 13:03) (109/72 - 122/70)  BP(mean): --  RR: 18 (19 Jul 2022 13:03) (17 - 18)  SpO2: 92% (19 Jul 2022 13:03) (92% - 100%)    Parameters below as of 19 Jul 2022 13:03  Patient On (Oxygen Delivery Method): nasal cannula  O2 Flow (L/min): 2    CONSTITUTIONAL: NAD, well-developed, thin, sitting in the chair   EYES: conjunctiva and sclera clear  ENMT: Moist oral mucosa  RESPIRATORY: Normal respiratory effort; lungs are clear to auscultation bilaterally  CARDIOVASCULAR: Regular rate and rhythm, normal S1 and S2; No lower extremity edema  ABDOMEN: Nontender to palpation, normoactive bowel sounds, no rebound/guarding  MUSCULOSKELETAL: no clubbing or cyanosis of digits; no joint swelling or tenderness to palpation  PSYCH: A+O to person, place, and time; affect appropriate  SKIN: No rashes; no palpable lesions      LABS:                        12.6   13.23 )-----------( 237      ( 19 Jul 2022 07:25 )             37.7                       RADIOLOGY & ADDITIONAL TESTS:  Results Reviewed:   Imaging Personally Reviewed:  Electrocardiogram Personally Reviewed:    COORDINATION OF CARE:  Care Discussed with Consultants/Other Providers [Y/N]:  Prior or Outpatient Records Reviewed [Y/N]:

## 2022-07-19 NOTE — SWALLOW VFSS/MBS ASSESSMENT ADULT - DELAYED INITIATION OF THE PHARYNGEAL SWALLOW (IN SECONDS)
pharyngeal swallow triggered at the pyriforms pharyngeal swallow triggered at the pyriform sinuses for mildly thick liquids; pharyngeal swallow triggered at the valleculae for moderately thick liquids

## 2022-07-19 NOTE — SWALLOW VFSS/MBS ASSESSMENT ADULT - DIAGNOSTIC IMPRESSIONS
86 yo M w/ PMHx of COPD presents to the ED w/ hypoxia. Suspected to have aspiration PNA. Pt presents w/ an oropharyngeal dysphagia characterized by poor bolus formation/control, reduced hyolaryngeal excursion, delayed onset of pharyngeal swallow, and penetration of thin liquids w/ subsequent silent aspiration of remaining residue.

## 2022-07-19 NOTE — SWALLOW VFSS/MBS ASSESSMENT ADULT - ADDITIONAL RECOMMENDATIONS
Consider upgrade to thin liquids by home SLP w/ consistent use of strategies (chin tuck or small, single sips)

## 2022-07-19 NOTE — SWALLOW VFSS/MBS ASSESSMENT ADULT - ADDITIONAL INFORMATION
Take 2 weeks off the spironolactone then message Dr. Wright to give him an update.   
Some calcifications of laryngeal structures noted

## 2022-07-19 NOTE — PROGRESS NOTE ADULT - SUBJECTIVE AND OBJECTIVE BOX
[facility] - PROGRESS NOTE    CHIEF COMPLAINT: acute on chronic hypoxic respiratory failure; chronic hypercapnic respiratory failure; COPD; emphysema; aspiration pneumonia    INTERVAL HISTORY: looks well in bed; awake and alert; weak and frail; has yet to be out of bed; restarted on a puree diet with clear liquid fluids after reevaluation by the speech and swallow team; no shortness of breath or hypoxemia on a 2lpm nasal canula @ rest; no cough, sputum production, hemoptysis, chest congestion or wheeze; no fevers, chills or sweats; no chest pain/pressure or palpitations; oropharyngeal dysphagia - esophagram -> prominent aortic knob demonstrates mass effect upon the proximal esophagus - small hiatal hernia with nonobstructing Schatzki's ring at the level of the GE junction - moderate gastroesophageal reflux - abnormal esophageal motility -> patient and his son were told to arrange follow-up with a GI motility specialist;    REVIEW OF SYSTEMS:  Constitutional: As per interval history  HEENT: Within normal limits  CV: As per interval history  Resp: As per interval history  GI: dysphagia  : Within normal limits  Musculoskeletal: Within normal limits  Skin: Within normal limits  Neurological: Within normal limits  Psychiatric: Within normal limits  Endocrine: Within normal limits  Hematologic/Lymphatic: Within normal limits  Allergic/Immunologic: Within normal limits    MEDICATIONS:     Pulmonary "  albuterol/ipratropium for Nebulization 3 milliLiter(s) Nebulizer every 6 hours  buDESOnide    Inhalation Suspension 0.5 milliGRAM(s) Inhalation two times a day  tiotropium 18 MICROgram(s) Capsule 1 Capsule(s) Inhalation daily    Anti-microbials:  piperacillin/tazobactam IVPB.. 3.375 Gram(s) IV Intermittent every 8 hours    Cardiovascular:  amLODIPine   Tablet 10 milliGRAM(s) Oral daily    Other:  aspirin  chewable 81 milliGRAM(s) Oral daily  atorvastatin 40 milliGRAM(s) Oral at bedtime  enoxaparin Injectable 40 milliGRAM(s) SubCutaneous every 24 hours  pantoprazole    Tablet 40 milliGRAM(s) Oral before breakfast  predniSONE   Tablet 40 milliGRAM(s) Oral daily    MEDICATIONS  (PRN):  acetaminophen     Tablet .. 650 milliGRAM(s) Oral every 6 hours PRN Temp greater or equal to 38C (100.4F), Mild Pain (1 - 3), Moderate Pain (4 - 6)    OBJECTIVE:    PHYSICAL EXAM:       ICU Vital Signs Last 24 Hrs  T(C): 36.4 (19 Jul 2022 05:53), Max: 36.9 (18 Jul 2022 13:24)  T(F): 97.6 (19 Jul 2022 05:53), Max: 98.4 (18 Jul 2022 13:24)  HR: 61 (19 Jul 2022 05:53) (61 - 79)  BP: 120/64 (19 Jul 2022 05:53) (107/59 - 120/64)  BP(mean): --  ABP: --  ABP(mean): --  RR: 18 (19 Jul 2022 05:53) (17 - 18)  SpO2: 100% (19 Jul 2022 05:53) (96% - 100%) on 2lpm nasal canula    General: Awake. Alert. Cooperative. No distress. Appears stated age. Weak. Frail. Cachectic	  HEENT: Atraumatic. Bitemporal wasting. Anicteric. Normal oral mucosa. PERRL. EOMI.  Neck: Supple. Trachea midline. Thyroid without enlargement/tenderness/nodules. No carotid bruit. No JVD. Loss of bilateral supraclavicular fat pads.	  Cardiovascular: Regular rate and rhythm. S1 S2 normal. No murmurs, rubs or gallops.  Respiratory: Respirations unlabored. Decreased breath sounds throughout. Basilar rales right > left. No wheeze. Kyphosis.  Abdomen: Soft. Non-tender. Non-distended. No organomegaly. No masses. Normal bowel sounds.  Extremities: Warm to touch. No clubbing or cyanosis. No pedal edema.  Pulses: 2+ peripheral pulses all extremities.	  Skin: Normal skin color. No rashes or lesions. No ecchymoses. No cyanosis. Warm to touch.  Lymph Nodes: Cervical, supraclavicular and axillary nodes normal  Neurological: Motor and sensory examination equal and normal. A and O x 3  Psychiatry: Appropriate mood and affect    LABS:                          12.6   13.23 )-----------( 237      ( 19 Jul 2022 07:25 )             37.7     CBC    WBC  13.23 <==, 15.80 <==, 8.58 <==, 14.55 <==, 7.73 <==    Hemoglobin  12.6 <<==, 15.6 <<==, 13.4 <<==, 15.0 <<==, 14.7 <<==    Hematocrit  37.7 <==, 47.3 <==, 41.3 <==, 45.4 <==, 45.0 <==    Platelets  237 <==, 287 <==, 202 <==, 246 <==, 218 <==      138  |  89<L>  |  18  ----------------------------<  137<H>    07-16  4.8   |  32<H>  |  0.63      LYTES    sodium  138 <==, 138 <==, 133 <==, 137 <==    potassium   4.8 <==, 4.6 <==, 4.9 <==, 4.6 <==    chloride  89 <==, 90 <==, 88 <==, 92 <==    carbon dioxide  32 <==, 34 <==, 34 <==, 36 <==    =============================================================================================  RENAL FUNCTION:    Creatinine:   0.63  <<==, 0.67  <<==, 0.72  <<==, 0.68  <<==    BUN:   18 <==, 13 <==, 13 <==, 12 <==    ============================================================================================    calcium   9.6 <==, 9.5 <==, 9.4 <==, 9.7 <==    phos   3.1 <==, 2.8 <==, 2.2 <==    mag   1.8 <==, 1.9 <==, 1.6 <==    ============================================================================================  LFTs    AST:   34 <==     ALT:  24  <==     AP:  69  <=    Bili:  1.2  <=    PT/INR - ( 16 Jul 2022 15:45 )   PT: 12.3 sec;   INR: 1.07 ratio      Venous Blood Gas:  07-19 @ 06:17  7.40/63/61/39/93.3  VBG Lactate: 1.2    Venous Blood Gas:  07-17 @ 06:00  7.35/74/39/41/60.5  VBG Lactate: 1.9    ABG - ( 16 Jul 2022 15:32 )  pH: 7.45  /  pCO2: 62    /  pO2: 40    / HCO3: 43    / Base Excess: 15.5  /  SaO2: 63.5      Blood Gas Profile - Venous (07.07.22 @ 08:10)   pH, Venous: 7.18   pCO2, Venous: 108 mmHg   pO2, Venous: 55 mmHg   HCO3, Venous: 40 mmol/L   Base Excess, Venous: 8.2 mmol/L   Oxygen Saturation, Venous: 81.2 %   Total CO2, Venous: 44 mmol/L   Blood Gas Source Venous: Venous     Procalcitonin, Serum: 0.06 ng/mL (07-16 @ 15:45)    Serum Pro-Brain Natriuretic Peptide: 374 pg/mL (07-16 @ 15:45)    CARDIAC MARKERS ( 16 Jul 2022 23:33 )  CPK x     /CKMB x     /CKMB Units x        troponin 15 ng/L    CARDIAC MARKERS ( 16 Jul 2022 15:45 )  CPK x     /CKMB x     /CKMB Units x        troponin 15 ng/L      MICROBIOLOGY:     Respiratory Viral Panel with COVID-19 by NICKIE (07.16.22 @ 15:45)   Rapid RVP Result: NotDetec   SARS-CoV-2: [facility]   This Respiratory Panel uses polymerase chain reaction (PCR) to detect for   adenovirus; coronavirus (HKU1, NL63, 229E, OC43); human metapneumovirus   (hMPV); human enterovirus/rhinovirus (Entero/RV); influenza A; influenza   A/H1; influenza A/H3; influenza A/H1-2009; influenza B; parainfluenza   viruses 1, 2, 3, 4; respiratory syncytial virus; Mycoplasma pneumoniae;   Chlamydophila pneumoniae; and SARS-CoV-2.     Culture - Sputum . (07.08.22 @ 07:05)   Culture Results:   Normal Respiratory Maggi present       RADIOLOGY:  [x] Chest radiographs reviewed and interpreted by me    EXAM:  XR CHEST PORTABLE URGENT 1V                          PROCEDURE DATE:  07/16/2022      FINDINGS:  Lungs appear hyperaerated but are otherwise clear.  Heart size cannot be accurately assessed on this projection.  There is no pneumothorax or pleural effusion.  No acute bony abnormality.    IMPRESSION:  Clear lungs.    LAKE MEADOWS MD; Resident Radiologist  This document has been electronically signed.  JAVAN HUSSEIN MD; Attending Interventional Radiologist  This document has been electronically signed. Jul 17 2022 11:37AM  ---------------------------------------------------------------------------------------------------------------    [facility]                          PROCEDURE DATE:  07/16/2022      FINDINGS:    LUNGS AND AIRWAYS: Patent central airways.  Emphysematous changes are   present in both lungs. Patchy  opacities in the right lower lobe.   Calcified granulomas in the left upper lobe measuring up to 3 mm..  PLEURA: Small right pleural effusion.  MEDIASTINUM AND KAMILAH: No lymphadenopathy.  VESSELS: Pulmonary arteries are normal in caliber. No filling defects are   noted..  HEART: Heart size is normal. No pericardial effusion.  CHEST WALL AND LOWER NECK: Within normal limits.  VISUALIZED UPPER ABDOMEN: Within normal limits.  BONES: Degenerative changes.    IMPRESSION:  No pulmonary embolism  Patchy opacities are noted in the right lower lobe are of uncertain   etiology. Follow-up CT scan is recommended in 3 months to ensure   resolution.    KINJAL KAUR MD; Resident Radiologist  This document has been electronically signed.  CHRISTIAN MONTGOMERY MD; Attending Radiologist  This document has been electronically signed. Jul 17 2022  9:24AM  ---------------------------------------------------------------------------------------------------------------  EXAM:  XR CHEST PORTABLE URGENT 1V                          PROCEDURE DATE:  07/12/2022      FINDINGS:    The lungs are hyperaerated clear.    Heart size is within normal limits.    No acute osseous findings.    IMPRESSION:    Clear lungs.    ALEXANDRIA LANGE MD; Resident Radiology  This document has been electronically signed.  GIA VAZQUEZ MD; Attending Radiologist  This document has been electronically signed. Jul 12 2022  9:42PM  ---------------------------------------------------------------------------------------------------------------  EXAM:  XR CHEST PORTABLE URGENT 1V                          PROCEDURE DATE:  07/07/2022        FINDINGS:    Interval placement of endotracheal tube which terminates in the mid   trachea.  Cardiac size is within normal limits.  The lungs are hyperaerated, but clear.  There are no pleural effusions. No pneumothorax.  Degenerative osseous changes.    IMPRESSION:  Endotracheal tube terminates mid trachea.    VEENA DESAI MD; Resident Radiology  This document has been electronically signed.  GIA VAZQUEZ MD; Attending Radiologist  This documenthas been electronically signed. Jul 7 2022 12:37PM  ---------------------------------------------------------------------------------------------------------------  EXAM:  XR CHEST PORTABLE URGENT 1V                          PROCEDURE DATE:  07/07/2022      FINDINGS:    The lungs are hyperaerated, but clear.  No pleural effusion or pneumothorax.  Heart size is within normal limits.  No acute abnormality within visible osseous structures.      IMPRESSION:    Clear lungs.    NEHA DOUGLAS MD; Resident Radiology  This document has been electronically signed.  GIA VAZQUEZ MD; Attending Radiologist  This document has been electronically signed. Jul 7 2022 11:46AM  ---------------------------------------------------------------------------------------------------------------  EXAM:  DUPLEX SCAN EXT VEINS LOWER BI                          PROCEDURE DATE:  07/11/2022      IMPRESSION:  No evidence of acute deep venous thrombosis in either lower extremity.  Peripheral echogenicity along the left gastrocnemius vein may be sequela   of old deep venous thrombosis.    JOEL RANDLE MD; Attending Radiologist  This document has been electronically signed. Jul 11 2022  6:06PM  ---------------------------------------------------------------------------------------------------------------

## 2022-07-19 NOTE — SWALLOW VFSS/MBS ASSESSMENT ADULT - SLP GENERAL OBSERVATIONS
Pt received in radiology in HOLLY chair. +2L O2 via NC. Pt alert, verbally responsive, and following commands for the purpose of this exam. Pt initially noted to have SpO2 of 73-78 on pulse ox. SpO2 checked on another machine w/ consistent reading of 73-78. DARREN Larsen made aware. Pt's SpO2 levels improved to 97-99 and MBS initiated.

## 2022-07-19 NOTE — SWALLOW VFSS/MBS ASSESSMENT ADULT - ORAL PHASE
uncontrolled spillover to the pyriform sinus; moderate oral residue observed w/ thick puree/Residue in oral cavity Residue in oral cavity/Uncontrolled bolus / spillover in kenyon-pharynx/Uncontrolled bolus / spillover in hypopharynx Uncontrolled bolus / spillover in kenyon-pharynx

## 2022-07-19 NOTE — DISCHARGE NOTE PROVIDER - NSDCCPCAREPLAN_GEN_ALL_CORE_FT
PRINCIPAL DISCHARGE DIAGNOSIS  Diagnosis: COPD exacerbation  Assessment and Plan of Treatment: Call your Health Care provider upon arrival home to make a follow up appointment within one week.  Take all inhalers as prescribed by your Health Care Provider.  Take steroids as prescribed by your Health Care Provider.  If your cough increases infrequency and severity and/or you have shortness of breath or increased shortness of breath call your Health Care Provider.  If you develop fever, chills, night sweats, malaise, and/or change in mental status call your Health care Provider.  Nutrition is very important.  Eat small frequent meals.  Increase your activity as tolerated.  Do not stay in bed all day

## 2022-07-19 NOTE — PHYSICAL THERAPY INITIAL EVALUATION ADULT - PERTINENT HX OF CURRENT PROBLEM, REHAB EVAL
87M with PMH of COPD, HTN, HLD presents to the ED with hypoxia. Pt recently admitted to the hospital for COPD exacerbation, briefly intubated for hypercarbic respiratory failure requiring MICU stay, and discharged home on 2L nasal cannula. The next morning, pt was found to be hypoxic to the 70s-80s on room air by visiting nurse and sent to the ED. Admit with acute respiratory failure with hypoxia, hypercapnia.

## 2022-07-19 NOTE — PROGRESS NOTE ADULT - ASSESSMENT
ASSESSMENT:    readmitted with acute on chronic hypoxic/hypercapnic respiratory failure felt to be due to a COPD exacerbation requiring treatment with NIV    recent admission for a COPD exacerbation following an aspiration event requiring a brief intubation for acute hypercapnic respiratory failure     oropharyngeal and esophageal dysphagia s/p EGD for food impaction in the distal esophagus     HTN/HLD/CAD    PLAN/RECOMMENDATIONS:    oxygen supplementation to keep saturation greater than 92% - suspect that he will need 2lpm via nasal canula at rest and 3 - 4lpm via nasal canula with exertion  the patient has home oxygen supplies but does not know how to use them  VBG 7/19 -> 7.40/63/61/39/93.3 c/w chronic and well compensated respiratory acidosis  he will benefit from a NIV device for sleep to prevent respiratory decompensations and recurrent admission  CT scan reviewed -> patent central airways - emphysema - patchy  opacities in the right lower lobe I suspect represent resolving aspiration pneumonia from last week (no CT scan done during that admission)  prednisone 40mg daily x 5 days  albuterol/atrovent nebs q6h  pulmicort 0.5mg nebs q12h - give after duoneb - rinse mouth after use  discontinue spiriva inhaler which is redundant  discharge on his usual Brovana and Yupelri nebulizers and albuterol MDI as needed  encouraging secretion clearance, pulmonary toilet, etc  GI    esophagram -> prominent aortic knob demonstrates mass effect upon the proximal esophagus - small hiatal hernia with non-obstructing Schatzki's ring at the level of the GE junction - moderate gastroesophageal reflux - abnormal esophageal motility    perhaps the patient should see a GI motility specialist in the inpatient setting  aspiration precautions    head of bed elevation at 90 degrees when eating    full assistance with meals    puree diet with thin liquids     small bites - no straws - chin tuck maneuver  cardiac meds: ASA/lipitor/norvasc  GI/DVT prophylaxis - protonix/SQ lovenox  out of bed and into the chair  ambulate as able    Will follow with you. Plan of care discussed with the patient at bedside and the medical team.    Aditya Simon MD, [facility]  [phone]  Pulmonary Medicine   ASSESSMENT:    readmitted with acute on chronic hypoxic/hypercapnic respiratory failure felt to be due to a COPD exacerbation requiring treatment with NIV    recent admission for a COPD exacerbation following an aspiration event requiring a brief intubation for acute hypercapnic respiratory failure     oropharyngeal and esophageal dysphagia s/p EGD for food impaction in the distal esophagus     HTN/HLD/CAD    PLAN/RECOMMENDATIONS:    oxygen supplementation to keep saturation greater than 92% - suspect that he will need 2lpm via nasal canula at rest and 3 - 4lpm via nasal canula with exertion  the patient has home oxygen supplies but does not know how to use them  VBG 7/19 -> 7.40/63/61/39/93.3 c/w chronic and well compensated respiratory acidosis  he will benefit from a NIV device for sleep to prevent respiratory decompensations and recurrent admissions  CT scan reviewed -> patent central airways - emphysema - patchy  opacities in the right lower lobe I suspect represent resolving aspiration pneumonia from last week (no CT scan done during that admission)  prednisone 40mg daily x 5 days  albuterol/atrovent nebs q6h  pulmicort 0.5mg nebs q12h - give after duoneb - rinse mouth after use  discontinue spiriva inhaler which is redundant  discharge on his usual Brovana and Yupelri nebulizers and albuterol MDI as needed  encouraging secretion clearance, pulmonary toilet, etc  GI    esophagram -> prominent aortic knob demonstrates mass effect upon the proximal esophagus - small hiatal hernia with non-obstructing Schatzki's ring at the level of the GE junction - moderate gastroesophageal reflux - abnormal esophageal motility    perhaps the patient should see a GI motility specialist in the inpatient setting  aspiration precautions    head of bed elevation at 90 degrees when eating    full assistance with meals    puree diet with thin liquids     small bites - no straws - chin tuck maneuver  cardiac meds: ASA/lipitor/norvasc  GI/DVT prophylaxis - protonix/SQ lovenox  out of bed and into the chair  ambulate as able    Will follow with you. Plan of care discussed with the patient at bedside and the medical team.    The patient is again hospitalized with acute on chronic hypercapnic respiratory failure in the setting of a COPD exacerbation with a pCO2 level greater than 100 mmHg. He has recently required intubation. The patient's respiratory acidosis could not be controlled on BIPAP therapy. Therefore, I feel that the patient required NIV augmented volume ventilation not available on a standard BIPAP for home use to better control the patient's underlying severe obstructive lung disease. Without NIV, the patient's condition will continue to deteriorate leading to frequent readmissions and possible death. Please expedite arrangements for home BIV for the patient's anticipated discharge in the near future.    Aditya Simon MD, [facility]  [phone]  Pulmonary Medicine

## 2022-07-19 NOTE — DISCHARGE NOTE PROVIDER - PROVIDER TOKENS
PROVIDER:[TOKEN:[[id number]],FOLLOWUP:[1-3 days]] PROVIDER:[TOKEN:[[id number]],FOLLOWUP:[1-3 days]],PROVIDER:[TOKEN:[[id number]]],PROVIDER:[TOKEN:[[id number]]]

## 2022-07-19 NOTE — SWALLOW VFSS/MBS ASSESSMENT ADULT - ASPIRATION AFTER SWALLOW - SILENT
silent aspiration of unretrieved material below the laryngeal vestibule w/o clearance despite cued cough/Mild

## 2022-07-19 NOTE — DISCHARGE NOTE PROVIDER - CARE PROVIDERS DIRECT ADDRESSES
,[email or web address],DirectAddress_Unknown,DirectAddress_Unknown

## 2022-07-19 NOTE — DISCHARGE NOTE PROVIDER - CARE PROVIDER_API CALL
Aguilar Biggs)  Gastroenterology; Internal Medicine  [address] [place] [address]  Phone: [phone]  Fax: [phone]  Follow Up Time: 1-3 days   Aguilar Biggs)  Gastroenterology; Internal Medicine  [address] [place] [address]  Phone: [phone]  Fax: [phone]  Follow Up Time: 1-3 days    Aditya Simon)  Internal Medicine; Pulmonary Disease  [address] [facility], [address]  [place] [address]  Phone: [phone]  Fax: [phone]  Follow Up Time:     JAI BAILEY  Internal Medicine  [facility] [place] [address]  Phone: [phone]  Fax: [phone]  Follow Up Time:

## 2022-07-19 NOTE — DISCHARGE NOTE PROVIDER - NSDCMRMEDTOKEN_GEN_ALL_CORE_FT
albuterol 90 mcg/inh inhalation aerosol: 1 puff(s) inhaled every 6 hours  aspirin 81 mg oral tablet, chewable: 1 tab(s) orally once a day  atorvastatin 40 mg oral tablet: 1 tab(s) orally once a day (at bedtime)  FELODIPINE ER 10 MG TABLET: 1 tab(s) orally once a day  INCRUSE ELLIPTA 62.5 MCG INH: 1 puff(s) inhaled 2 times a day  predniSONE 10 mg oral tablet: 1 tab(s) orally once a day  Protonix 40 mg oral delayed release tablet: 1 tab(s) orally once a day 30 mins prior to breakfast    albuterol 90 mcg/inh inhalation aerosol: 1 puff(s) inhaled every 6 hours  amLODIPine 10 mg oral tablet: 1 tab(s) orally once a day  aspirin 81 mg oral tablet, chewable: 1 tab(s) orally once a day  atorvastatin 40 mg oral tablet: 1 tab(s) orally once a day (at bedtime)  FELODIPINE ER 10 MG TABLET: 1 tab(s) orally once a day  INCRUSE ELLIPTA 62.5 MCG INH: 1 puff(s) inhaled 2 times a day  predniSONE 10 mg oral tablet: 1 tab(s) orally once a day resume on 7/23/22  predniSONE 20 mg oral tablet: 2 tab(s) orally once a day until 7/22/22    Protonix 40 mg oral delayed release tablet: 1 tab(s) orally once a day 30 mins prior to breakfast

## 2022-07-19 NOTE — SWALLOW VFSS/MBS ASSESSMENT ADULT - PHARYNGEAL PHASE COMMENTS
spontaneous repeat swallow clears majority of pharyngeal residue Most pharyngeal residue cleared w/ spontaneous repeat swallows x2-3

## 2022-07-19 NOTE — PHYSICAL THERAPY INITIAL EVALUATION ADULT - ADDITIONAL COMMENTS
lives in house with 3 steps to enter and BHR; 1 flight of stairs to 2nd floor bedroom; with grandson and granddaughter, IND at PLOF but owns RW, rollator and 3 pronged cane

## 2022-07-19 NOTE — SWALLOW VFSS/MBS ASSESSMENT ADULT - SLP PERTINENT HISTORY OF CURRENT PROBLEM
87M with PMH of COPD, HTN, HLD presents to the ED with hypoxia. Patient was found to be hypoxic to the 70s-80s on room air by visiting nurse and sent to the ED. Of note, Pt w/ leukocytosis. Recommendation for NPO pending S/S evaluation and GI f/u. Pulmonary consulted for hypoxia and impression reports he does have a right lower lobe patchy opacity which may represent aspiration pneumonia, patient was intubated about 10 days ago and can be an aspiration pneumonia. Pulmonary recommendation to continue supplemental O2, continue zosyn/steroids, would have GI and speech/swallow evaluate again, aspiration precautions.

## 2022-07-20 LAB
HCT VFR BLD CALC: 37.6 % — LOW (ref 39–50)
HGB BLD-MCNC: 12.7 G/DL — LOW (ref 13–17)
MCHC RBC-ENTMCNC: 32 PG — SIGNIFICANT CHANGE UP (ref 27–34)
MCHC RBC-ENTMCNC: 33.8 GM/DL — SIGNIFICANT CHANGE UP (ref 32–36)
MCV RBC AUTO: 94.7 FL — SIGNIFICANT CHANGE UP (ref 80–100)
NRBC # BLD: 0 /100 WBCS — SIGNIFICANT CHANGE UP (ref 0–0)
PLATELET # BLD AUTO: 247 K/UL — SIGNIFICANT CHANGE UP (ref 150–400)
RBC # BLD: 3.97 M/UL — LOW (ref 4.2–5.8)
RBC # FLD: 12.8 % — SIGNIFICANT CHANGE UP (ref 10.3–14.5)
WBC # BLD: 13.27 K/UL — HIGH (ref 3.8–10.5)
WBC # FLD AUTO: 13.27 K/UL — HIGH (ref 3.8–10.5)

## 2022-07-20 PROCEDURE — 99232 SBSQ HOSP IP/OBS MODERATE 35: CPT

## 2022-07-20 RX ADMIN — Medication 3 MILLILITER(S): at 19:58

## 2022-07-20 RX ADMIN — Medication 0.5 MILLIGRAM(S): at 05:06

## 2022-07-20 RX ADMIN — PIPERACILLIN AND TAZOBACTAM 25 GRAM(S): 4; .5 INJECTION, POWDER, LYOPHILIZED, FOR SOLUTION INTRAVENOUS at 14:15

## 2022-07-20 RX ADMIN — Medication 3 MILLILITER(S): at 14:16

## 2022-07-20 RX ADMIN — Medication 0.5 MILLIGRAM(S): at 19:58

## 2022-07-20 RX ADMIN — PIPERACILLIN AND TAZOBACTAM 25 GRAM(S): 4; .5 INJECTION, POWDER, LYOPHILIZED, FOR SOLUTION INTRAVENOUS at 05:04

## 2022-07-20 RX ADMIN — Medication 3 MILLILITER(S): at 00:50

## 2022-07-20 RX ADMIN — ATORVASTATIN CALCIUM 40 MILLIGRAM(S): 80 TABLET, FILM COATED ORAL at 22:29

## 2022-07-20 RX ADMIN — Medication 40 MILLIGRAM(S): at 05:05

## 2022-07-20 RX ADMIN — ENOXAPARIN SODIUM 40 MILLIGRAM(S): 100 INJECTION SUBCUTANEOUS at 06:46

## 2022-07-20 RX ADMIN — PIPERACILLIN AND TAZOBACTAM 25 GRAM(S): 4; .5 INJECTION, POWDER, LYOPHILIZED, FOR SOLUTION INTRAVENOUS at 20:01

## 2022-07-20 RX ADMIN — Medication 81 MILLIGRAM(S): at 14:16

## 2022-07-20 RX ADMIN — AMLODIPINE BESYLATE 10 MILLIGRAM(S): 2.5 TABLET ORAL at 05:07

## 2022-07-20 RX ADMIN — Medication 3 MILLILITER(S): at 05:06

## 2022-07-20 RX ADMIN — PANTOPRAZOLE SODIUM 40 MILLIGRAM(S): 20 TABLET, DELAYED RELEASE ORAL at 06:46

## 2022-07-20 NOTE — PROGRESS NOTE ADULT - SUBJECTIVE AND OBJECTIVE BOX
[facility] - PROGRESS NOTE    CHIEF COMPLAINT: acute on chronic hypoxic respiratory failure; chronic hypercapnic respiratory failure; COPD; emphysema; aspiration pneumonia    INTERVAL HISTORY: long conversation last night with the patient's son Luis Alfredo in [place] about Eze medical conditions, plans of care and goals of care -> the patient is quite depressed over the recent loss of his wife of 65 years -> he would like to go home and continue treatment but does not want to return to the hospital -> I suggested discharge with home hospice to which the son agreed -> they are not interested in modifying his diet; looks well in bed; awake and alert; weak and frail; has yet to be out of bed; restarted on a puree diet with clear liquid fluids after reevaluation by the speech and swallow team; no shortness of breath or hypoxemia on a 2lpm nasal canula @ rest; no cough, sputum production, hemoptysis, chest congestion or wheeze; no fevers, chills or sweats; no chest pain/pressure or palpitations; oropharyngeal dysphagia with evidence of penetration and aspiration with the tested consistencie0 on MBS (the patient wishes to continue a regular diet) - esophagram -> prominent aortic knob demonstrates mass effect upon the proximal esophagus - small hiatal hernia with nonobstructing Schatzki's ring at the level of the GE junction - moderate gastroesophageal reflux - abnormal esophageal motility    REVIEW OF SYSTEMS:  Constitutional: As per interval history  HEENT: Within normal limits  CV: As per interval history  Resp: As per interval history  GI: dysphagia  : Within normal limits  Musculoskeletal: Within normal limits  Skin: Within normal limits  Neurological: Within normal limits  Psychiatric: depression  Endocrine: Within normal limits  Hematologic/Lymphatic: Within normal limits  Allergic/Immunologic: Within normal limits      MEDICATIONS:     Pulmonary "  albuterol/ipratropium for Nebulization 3 milliLiter(s) Nebulizer every 6 hours  buDESOnide    Inhalation Suspension 0.5 milliGRAM(s) Inhalation two times a day  tiotropium 18 MICROgram(s) Capsule 1 Capsule(s) Inhalation daily    Anti-microbials:  piperacillin/tazobactam IVPB.. 3.375 Gram(s) IV Intermittent every 8 hours    Cardiovascular:  amLODIPine   Tablet 10 milliGRAM(s) Oral daily    Other:  aspirin  chewable 81 milliGRAM(s) Oral daily  atorvastatin 40 milliGRAM(s) Oral at bedtime  enoxaparin Injectable 40 milliGRAM(s) SubCutaneous every 24 hours  pantoprazole    Tablet 40 milliGRAM(s) Oral before breakfast  predniSONE   Tablet 40 milliGRAM(s) Oral daily    MEDICATIONS  (PRN):  acetaminophen     Tablet .. 650 milliGRAM(s) Oral every 6 hours PRN Temp greater or equal to 38C (100.4F), Mild Pain (1 - 3), Moderate Pain (4 - 6)      OBJECTIVE:    PHYSICAL EXAM:       ICU Vital Signs Last 24 Hrs  T(C): 36.1 (20 Jul 2022 05:05), Max: 36.7 (19 Jul 2022 13:03)  T(F): 97 (20 Jul 2022 05:05), Max: 98.1 (19 Jul 2022 21:13)  HR: 73 (20 Jul 2022 05:05) (55 - 88)  BP: 131/67 (20 Jul 2022 05:05) (112/60 - 131/67)  BP(mean): --  ABP: --  ABP(mean): --  RR: 18 (20 Jul 2022 05:05) (18 - 18)  SpO2: 99% (20 Jul 2022 05:05) (92% - 100%) on 2lpm nasal canula @ rest    General: Awake. Alert. Cooperative. No distress. Appears stated age. Weak. Frail. Cachectic	  HEENT: Atraumatic. Bitemporal wasting. Anicteric. Normal oral mucosa. PERRL. EOMI.  Neck: Supple. Trachea midline. Thyroid without enlargement/tenderness/nodules. No carotid bruit. No JVD. Loss of bilateral supraclavicular fat pads.	  Cardiovascular: Regular rate and rhythm. S1 S2 normal. No murmurs, rubs or gallops.  Respiratory: Respirations unlabored. Decreased breath sounds throughout. Resolved basilar rales. No wheeze. Kyphosis.  Abdomen: Soft. Non-tender. Non-distended. No organomegaly. No masses. Normal bowel sounds.  Extremities: Warm to touch. No clubbing or cyanosis. No pedal edema.  Pulses: 2+ peripheral pulses all extremities.	  Skin: Normal skin color. No rashes or lesions. No ecchymoses. No cyanosis. Warm to touch.  Lymph Nodes: Cervical, supraclavicular and axillary nodes normal  Neurological: Motor and sensory examination equal and normal. A and O x 3  Psychiatry: Depressed    LABS:                          12.6   13.23 )-----------( 237      ( 19 Jul 2022 07:25 )             37.7     CBC    WBC  13.23 <==, 15.80 <==, 8.58 <==    Hemoglobin  12.6 <<==, 15.6 <<==, 13.4 <<==    Hematocrit  37.7 <==, 47.3 <==, 41.3 <==    Platelets  237 <==, 287 <==, 202 <==      138  |  89<L>  |  18  ----------------------------<  137<H>    07-16  4.8   |  32<H>  |  0.63      LYTES    sodium  138 <==, 138 <==, 133 <==    potassium   4.8 <==, 4.6 <==, 4.9 <==    chloride  89 <==, 90 <==, 88 <==    carbon dioxide  32 <==, 34 <==, 34 <==    =============================================================================================  RENAL FUNCTION:    Creatinine:   0.63  <<==, 0.67  <<==, 0.72  <<==    BUN:   18 <==, 13 <==, 13 <==    ============================================================================================    calcium   9.6 <==, 9.5 <==, 9.4 <==    phos   3.1 <==, 2.8 <==    mag   1.8 <==, 1.9 <==    ============================================================================================  LFTs    AST:   34 <==     ALT:  24  <==     AP:  69  <=    Bili:  1.2  <=    PT/INR - ( 16 Jul 2022 15:45 )   PT: 12.3 sec;   INR: 1.07 ratio      Venous Blood Gas:  07-19 @ 06:17  7.40/63/61/39/93.3  VBG Lactate: 1.2    Venous Blood Gas:  07-17 @ 06:00  7.35/74/39/41/60.5  VBG Lactate: 1.9    ABG - ( 16 Jul 2022 15:32 )  pH: 7.45  /  pCO2: 62    /  pO2: 40    / HCO3: 43    / Base Excess: 15.5  /  SaO2: 63.5      Blood Gas Profile - Venous (07.07.22 @ 08:10)   pH, Venous: 7.18   pCO2, Venous: 108 mmHg   pO2, Venous: 55 mmHg   HCO3, Venous: 40 mmol/L   Base Excess, Venous: 8.2 mmol/L   Oxygen Saturation, Venous: 81.2 %   Total CO2, Venous: 44 mmol/L   Blood Gas Source Venous: Venous     Procalcitonin, Serum: 0.06 ng/mL (07-16 @ 15:45)    Serum Pro-Brain Natriuretic Peptide: 374 pg/mL (07-16 @ 15:45)    CARDIAC MARKERS ( 16 Jul 2022 23:33 )  CPK x     /CKMB x     /CKMB Units x        troponin 15 ng/L    CARDIAC MARKERS ( 16 Jul 2022 15:45 )  CPK x     /CKMB x     /CKMB Units x        troponin 15 ng/L      MICROBIOLOGY:     Respiratory Viral Panel with COVID-19 by NICKIE (07.16.22 @ 15:45)   Rapid RVP Result: NotDete   SARS-CoV-2: NotDete   This Respiratory Panel uses polymerase chain reaction (PCR) to detect for   adenovirus; coronavirus (HKU1, NL63, 229E, OC43); human metapneumovirus   (hMPV); human enterovirus/rhinovirus (Entero/RV); influenza A; influenza   A/H1; influenza A/H3; influenza A/H1-2009; influenza B; parainfluenza   viruses 1, 2, 3, 4; respiratory syncytial virus; Mycoplasma pneumoniae;   Chlamydophila pneumoniae; and SARS-CoV-2.     Culture - Sputum . (07.08.22 @ 07:05)   Culture Results:   Normal Respiratory Maggi present     MRSA/MSSA PCR (07.07.22 @ 13:46)   MRSA PCR Result.: NotDetec:   Staph aureus PCR Result: Detected   MRSA/MSSA PCR assay is a qualitative in vitro diagnostic test for the   direct detection and differentiation of methicillin-resistant   Staphylococcus aureus (MRSA) from Staphylococcus aureus (SA). The assay   detects DNA from nasal swabs in patients atrisk for nasal colonization.   It is not intended to diagnose MRSA or SA infections nor guide or monitor   treatment for MRSA/SA infections. A negative result does not preclude   nasal colonization. The Real-Time PCR assay is FDA-approved, and its   performance has been established by [facility], [place], [place].     RADIOLOGY:  [x] Chest radiographs reviewed and interpreted by me    EXAM:  XR SWAL FUNC YONY VID CON STDY                          PROCEDURE DATE:  07/19/2022      FINDINGS:    There are penetration and aspiration the tested consistencies.    IMPRESSION:    Evidence of penetration and aspiration with the tested consistencies.    For further information and recommendations, please refer to the speech   pathologist final report which is available for review in the electronic   medical record.    APOLINAR RAMOS MD; Resident Radiology  This document has been electronically signed.  GIA VAZQUEZ MD; Attending Radiologist  This document has been electronically signed. Jul 19 2022 11:40PM  ---------------------------------------------------------------------------------------------------------------  EXAM:  XR CHEST PORTABLE URGENT 1V                          PROCEDURE DATE:  07/16/2022      FINDINGS:  Lungs appear hyperaerated but are otherwise clear.  Heart size cannot be accurately assessed on this projection.  There is no pneumothorax or pleural effusion.  No acute bony abnormality.    IMPRESSION:  Clear lungs.    LAKE MEADOWS MD; Resident Radiologist  This document has been electronically signed.  JAVAN HUSSEIN MD; Attending Interventional Radiologist  This document has been electronically signed. Jul 17 2022 11:37AM  ---------------------------------------------------------------------------------------------------------------    [facility]  [facility]                          PROCEDURE DATE:  07/16/2022      FINDINGS:    LUNGS AND AIRWAYS: Patent central airways.  Emphysematous changes are   present in both lungs. Patchy  opacities in the right lower lobe.   Calcified granulomas in the left upper lobe measuring up to 3 mm..  PLEURA: Small right pleural effusion.  MEDIASTINUM AND KAMILAH: No lymphadenopathy.  VESSELS: Pulmonary arteries are normal in caliber. No filling defects are   noted..  HEART: Heart size is normal. No pericardial effusion.  CHEST WALL AND LOWER NECK: Within normal limits.  VISUALIZED UPPER ABDOMEN: Within normal limits.  BONES: Degenerative changes.    IMPRESSION:  No pulmonary embolism  Patchy opacities are noted in the right lower lobe are of uncertain   etiology. Follow-up CT scan is recommended in 3 months to ensure   resolution.    KINJAL KAUR MD; Resident Radiologist  This document has been electronically signed.  CHRISTIAN MONTGOMERY MD; Attending Radiologist  This document has been electronically signed. Jul 17 2022  9:24AM  ---------------------------------------------------------------------------------------------------------------  EXAM:  XR CHEST PORTABLE URGENT 1V                          PROCEDURE DATE:  07/12/2022      FINDINGS:    The lungs are hyperaerated clear.    Heart size is within normal limits.    No acute osseous findings.    IMPRESSION:    Clear lungs.    ALEXANDRIA LANGE MD; Resident Radiology  This document has been electronically signed.  GIA VAZQUEZ MD; Attending Radiologist  This document has been electronically signed. Jul 12 2022  9:42PM  ---------------------------------------------------------------------------------------------------------------  EXAM:  XR CHEST PORTABLE URGENT 1V                          PROCEDURE DATE:  07/07/2022        FINDINGS:    Interval placement of endotracheal tube which terminates in the mid   trachea.  Cardiac size is within normal limits.  The lungs are hyperaerated, but clear.  There are no pleural effusions. No pneumothorax.  Degenerative osseous changes.    IMPRESSION:  Endotracheal tube terminates mid trachea.    VEENA DESAI MD; Resident Radiology  This document has been electronically signed.  GIA VAZQUEZ MD; Attending Radiologist  This documenthas been electronically signed. Jul 7 2022 12:37PM  ---------------------------------------------------------------------------------------------------------------  EXAM:  XR CHEST PORTABLE URGENT 1V                          PROCEDURE DATE:  07/07/2022      FINDINGS:    The lungs are hyperaerated, but clear.  No pleural effusion or pneumothorax.  Heart size is within normal limits.  No acute abnormality within visible osseous structures.      IMPRESSION:    Clear lungs.    NEHA DOUGLAS MD; Resident Radiology  This document has been electronically signed.  GIA VAZQUEZ MD; Attending Radiologist  This document has been electronically signed. Jul 7 2022 11:46AM  ---------------------------------------------------------------------------------------------------------------  EXAM:  DUPLEX SCAN EXT VEINS LOWER BI                          PROCEDURE DATE:  07/11/2022      IMPRESSION:  No evidence of acute deep venous thrombosis in either lower extremity.  Peripheral echogenicity along the left gastrocnemius vein may be sequela   of old deep venous thrombosis.    JOEL RANDLE MD; Attending Radiologist  This document has been electronically signed. Jul 11 2022  6:06PM  ---------------------------------------------------------------------------------------------------------------

## 2022-07-20 NOTE — PROGRESS NOTE ADULT - ASSESSMENT
ASSESSMENT:    readmitted with acute on chronic hypoxic/hypercapnic respiratory failure felt to be due to a COPD exacerbation requiring treatment with NIV    recent admission for a COPD exacerbation following an aspiration event requiring a brief intubation for acute hypercapnic respiratory failure     oropharyngeal and esophageal dysphagia s/p EGD for food impaction in the distal esophagus     HTN/HLD/CAD    PLAN/RECOMMENDATIONS:    oxygen supplementation to keep saturation greater than 92% - he will need 2lpm via nasal canula at rest and 3 - 4lpm via nasal canula with exertion  the patient has home oxygen supplies but does not know how to use them  VBG 7/19 -> 7.40/63/61/39/93.3 c/w chronic and well compensated respiratory acidosis  I have arranged for an AVAPS machine for discharge to prevent respiratory decompensations and recurrent admissions - awaiting insurance approval  CT scan reviewed -> patent central airways - emphysema - patchy  opacities in the right lower lobe I suspect represent resolving aspiration pneumonia from last week (no CT scan done during that admission)  prednisone 40mg daily x 5 days  albuterol/atrovent nebs q6h  pulmicort 0.5mg nebs q12h - give after duoneb - rinse mouth after use  discontinue spiriva inhaler which is redundant  discharge on his usual Brovana and Yupelri nebulizers and albuterol MDI as needed  encouraging secretion clearance, pulmonary toilet, etc  GI    esophagram -> prominent aortic knob demonstrates mass effect upon the proximal esophagus - small hiatal hernia with non-obstructing Schatzki's ring at the level of the GE junction - moderate gastroesophageal reflux - abnormal esophageal motility    MBS -> oropharyngeal dysphagia with evidence of penetration and aspiration with the tested consistencie0 on MBS     the patient and his son do not want to alter his diet    the patient should leave the hospital with an appointment to see a GI motility specialist  aspiration precautions    head of bed elevation at 90 degrees when eating    full assistance with meals    puree diet with thin liquids     small bites - no straws - chin tuck maneuver  cardiac meds: ASA/lipitor/norvasc  GI/DVT prophylaxis - protonix/SQ lovenox  out of bed and into the chair  ambulate as able    Will follow with you. Plan of care discussed with the patient at bedside and the medical team.    long conversation last night with the patient's son Luis Alfredo in [place] about Alessandros medical conditions, plans of care and goals of care -> the patient is quite depressed over the recent loss of his wife of 65 years -> he would like to go home and continue treatment but does not want to return to the hospital -> they do not want any further invasive interventions - I suggested discharge with home hospice to which the son agreed -> they are not interested in modifying his diet;     The patient is again hospitalized with acute on chronic hypercapnic respiratory failure in the setting of a COPD exacerbation with a pCO2 level greater than 100 mmHg. He has recently required intubation. The patient's respiratory acidosis could not be controlled on BIPAP therapy. Therefore, I feel that the patient required NIV augmented volume ventilation not available on a standard BIPAP for home use to better control the patient's underlying severe obstructive lung disease. Without NIV, the patient's condition will continue to deteriorate leading to frequent readmissions and possible death. Please expedite arrangements for home BIV for the patient's anticipated discharge in the near future.    Aditya Simon MD, [facility]  [phone]  Pulmonary Medicine

## 2022-07-20 NOTE — PROGRESS NOTE ADULT - SUBJECTIVE AND OBJECTIVE BOX
[facility] Division of Hospital Medicine  Sunshine MusaDO vani   Available via [employer] Teams      Patient is a 87y old  Male who presents with a chief complaint of hypoxia (19 Jul 2022 15:22)      SUBJECTIVE / OVERNIGHT EVENTS:  Overall feels well  No SOB or CP     MEDICATIONS  (STANDING):  albuterol/ipratropium for Nebulization 3 milliLiter(s) Nebulizer every 6 hours  amLODIPine   Tablet 10 milliGRAM(s) Oral daily  aspirin  chewable 81 milliGRAM(s) Oral daily  atorvastatin 40 milliGRAM(s) Oral at bedtime  buDESOnide    Inhalation Suspension 0.5 milliGRAM(s) Inhalation two times a day  enoxaparin Injectable 40 milliGRAM(s) SubCutaneous every 24 hours  pantoprazole    Tablet 40 milliGRAM(s) Oral before breakfast  piperacillin/tazobactam IVPB.. 3.375 Gram(s) IV Intermittent every 8 hours  predniSONE   Tablet 40 milliGRAM(s) Oral daily    MEDICATIONS  (PRN):  acetaminophen     Tablet .. 650 milliGRAM(s) Oral every 6 hours PRN Temp greater or equal to 38C (100.4F), Mild Pain (1 - 3), Moderate Pain (4 - 6)      CAPILLARY BLOOD GLUCOSE        I&O's Summary    19 Jul 2022 07:01  -  20 Jul 2022 07:00  --------------------------------------------------------  IN: 720 mL / OUT: 600 mL / NET: 120 mL        PHYSICAL EXAM:  Vital Signs Last 24 Hrs  T(C): 36.7 (20 Jul 2022 13:45), Max: 36.7 (19 Jul 2022 21:13)  T(F): 98 (20 Jul 2022 13:45), Max: 98.1 (19 Jul 2022 21:13)  HR: 86 (20 Jul 2022 13:45) (71 - 87)  BP: 118/70 (20 Jul 2022 13:45) (108/57 - 131/67)  BP(mean): --  RR: 18 (20 Jul 2022 13:45) (18 - 18)  SpO2: 97% (20 Jul 2022 13:45) (92% - 100%)    Parameters below as of 20 Jul 2022 13:45  Patient On (Oxygen Delivery Method): nasal cannula  O2 Flow (L/min): 3    CONSTITUTIONAL: NAD, well-developed, thin, eating  EYES: conjunctiva and sclera clear  ENMT: Moist oral mucosa  RESPIRATORY: Normal respiratory effort; lungs are clear to auscultation bilaterally  CARDIOVASCULAR: Regular rate and rhythm, normal S1 and S2; No lower extremity edema  ABDOMEN: Nontender to palpation, normoactive bowel sounds, no rebound/guarding  MUSCULOSKELETAL: no clubbing or cyanosis of digits; no joint swelling or tenderness to palpation  PSYCH: A+O to person, place, and time; affect appropriate  SKIN: No rashes; no palpable lesions      LABS:                        12.7   13.27 )-----------( 247      ( 20 Jul 2022 09:16 )             37.6                       RADIOLOGY & ADDITIONAL TESTS:  Results Reviewed:   Imaging Personally Reviewed:  Electrocardiogram Personally Reviewed:    COORDINATION OF CARE:  Care Discussed with Consultants/Other Providers [Y/N]:  Prior or Outpatient Records Reviewed [Y/N]:

## 2022-07-20 NOTE — CHART NOTE - NSCHARTNOTEFT_GEN_A_CORE
Left 1 message for patient in regards to follow up care with callback information.
Left 2 messages for patient in regards to follow up care with callback information.

## 2022-07-21 ENCOUNTER — TRANSCRIPTION ENCOUNTER (OUTPATIENT)
Age: 87
End: 2022-07-21

## 2022-07-21 VITALS
SYSTOLIC BLOOD PRESSURE: 124 MMHG | HEART RATE: 65 BPM | TEMPERATURE: 97 F | RESPIRATION RATE: 18 BRPM | OXYGEN SATURATION: 100 % | DIASTOLIC BLOOD PRESSURE: 63 MMHG

## 2022-07-21 DIAGNOSIS — Z51.5 ENCOUNTER FOR PALLIATIVE CARE: ICD-10-CM

## 2022-07-21 DIAGNOSIS — Z71.89 OTHER SPECIFIED COUNSELING: ICD-10-CM

## 2022-07-21 PROCEDURE — 82330 ASSAY OF CALCIUM: CPT

## 2022-07-21 PROCEDURE — 85018 HEMOGLOBIN: CPT

## 2022-07-21 PROCEDURE — 92610 EVALUATE SWALLOWING FUNCTION: CPT

## 2022-07-21 PROCEDURE — 97161 PT EVAL LOW COMPLEX 20 MIN: CPT

## 2022-07-21 PROCEDURE — 82435 ASSAY OF BLOOD CHLORIDE: CPT

## 2022-07-21 PROCEDURE — 84145 PROCALCITONIN (PCT): CPT

## 2022-07-21 PROCEDURE — 96374 THER/PROPH/DIAG INJ IV PUSH: CPT

## 2022-07-21 PROCEDURE — 82962 GLUCOSE BLOOD TEST: CPT

## 2022-07-21 PROCEDURE — 71045 X-RAY EXAM CHEST 1 VIEW: CPT

## 2022-07-21 PROCEDURE — 94640 AIRWAY INHALATION TREATMENT: CPT

## 2022-07-21 PROCEDURE — 99497 ADVNCD CARE PLAN 30 MIN: CPT | Mod: 25

## 2022-07-21 PROCEDURE — 92526 ORAL FUNCTION THERAPY: CPT

## 2022-07-21 PROCEDURE — 82947 ASSAY GLUCOSE BLOOD QUANT: CPT

## 2022-07-21 PROCEDURE — 82803 BLOOD GASES ANY COMBINATION: CPT

## 2022-07-21 PROCEDURE — 99239 HOSP IP/OBS DSCHRG MGMT >30: CPT

## 2022-07-21 PROCEDURE — 85025 COMPLETE CBC W/AUTO DIFF WBC: CPT

## 2022-07-21 PROCEDURE — 85730 THROMBOPLASTIN TIME PARTIAL: CPT

## 2022-07-21 PROCEDURE — 84132 ASSAY OF SERUM POTASSIUM: CPT

## 2022-07-21 PROCEDURE — 99285 EMERGENCY DEPT VISIT HI MDM: CPT | Mod: 25

## 2022-07-21 PROCEDURE — 84484 ASSAY OF TROPONIN QUANT: CPT

## 2022-07-21 PROCEDURE — 36415 COLL VENOUS BLD VENIPUNCTURE: CPT

## 2022-07-21 PROCEDURE — 99223 1ST HOSP IP/OBS HIGH 75: CPT

## 2022-07-21 PROCEDURE — 85027 COMPLETE CBC AUTOMATED: CPT

## 2022-07-21 PROCEDURE — 74230 X-RAY XM SWLNG FUNCJ C+: CPT

## 2022-07-21 PROCEDURE — 83880 ASSAY OF NATRIURETIC PEPTIDE: CPT

## 2022-07-21 PROCEDURE — 84295 ASSAY OF SERUM SODIUM: CPT

## 2022-07-21 PROCEDURE — 0225U NFCT DS DNA&RNA 21 SARSCOV2: CPT

## 2022-07-21 PROCEDURE — 83605 ASSAY OF LACTIC ACID: CPT

## 2022-07-21 PROCEDURE — 85610 PROTHROMBIN TIME: CPT

## 2022-07-21 PROCEDURE — 80053 COMPREHEN METABOLIC PANEL: CPT

## 2022-07-21 PROCEDURE — 36600 WITHDRAWAL OF ARTERIAL BLOOD: CPT

## 2022-07-21 PROCEDURE — 85014 HEMATOCRIT: CPT

## 2022-07-21 PROCEDURE — 94660 CPAP INITIATION&MGMT: CPT

## 2022-07-21 PROCEDURE — 92611 MOTION FLUOROSCOPY/SWALLOW: CPT

## 2022-07-21 PROCEDURE — 71275 CT ANGIOGRAPHY CHEST: CPT

## 2022-07-21 RX ORDER — AMLODIPINE BESYLATE 2.5 MG/1
1 TABLET ORAL
Qty: 30 | Refills: 0
Start: 2022-07-21 | End: 2022-08-19

## 2022-07-21 RX ADMIN — Medication 0.5 MILLIGRAM(S): at 05:35

## 2022-07-21 RX ADMIN — Medication 40 MILLIGRAM(S): at 05:35

## 2022-07-21 RX ADMIN — AMLODIPINE BESYLATE 10 MILLIGRAM(S): 2.5 TABLET ORAL at 05:35

## 2022-07-21 RX ADMIN — Medication 3 MILLILITER(S): at 00:37

## 2022-07-21 RX ADMIN — ENOXAPARIN SODIUM 40 MILLIGRAM(S): 100 INJECTION SUBCUTANEOUS at 05:46

## 2022-07-21 RX ADMIN — PANTOPRAZOLE SODIUM 40 MILLIGRAM(S): 20 TABLET, DELAYED RELEASE ORAL at 05:36

## 2022-07-21 RX ADMIN — PIPERACILLIN AND TAZOBACTAM 25 GRAM(S): 4; .5 INJECTION, POWDER, LYOPHILIZED, FOR SOLUTION INTRAVENOUS at 05:35

## 2022-07-21 RX ADMIN — Medication 3 MILLILITER(S): at 05:35

## 2022-07-21 NOTE — DISCHARGE NOTE NURSING/CASE MANAGEMENT/SOCIAL WORK - NSDCFUADDAPPT_GEN_ALL_CORE_FT
APPTS ARE READY TO BE MADE: [x ] YES    Best Family or Patient Contact (if needed):    Additional Information about above appointments (if needed):    1: Dr. Biggs for motility study  2:   3:     Other comments or requests:

## 2022-07-21 NOTE — CONSULT NOTE ADULT - PROBLEM SELECTOR RECOMMENDATION 4
- MOLST form completed with patient today. Hospice referral to be initiated by CM. Geriatrics and Palliative Medicine Team will sign off as pt is being discharged today. Thank you for this consultation.     Mariela Sanches MD  [facility] Team Consults  Please call if we can be of assistance, [phone]

## 2022-07-21 NOTE — DISCHARGE NOTE NURSING/CASE MANAGEMENT/SOCIAL WORK - NSSCTYPOFSERV_GEN_ALL_CORE
Skilled nursing and home physical therapy visits. RN will evaluate for home health aide services. RN will call day after discharge to schedule visit.

## 2022-07-21 NOTE — CONSULT NOTE ADULT - PROBLEM SELECTOR RECOMMENDATION 3
- MOLST completed with pt today electing DNR, DNI, no feeding tube and no hospitalizations  - Plan for hospice referral initiation by CM before hospital discharge, pt will return to home with home services with transition to hospice pending their consultation

## 2022-07-21 NOTE — PROGRESS NOTE ADULT - PROBLEM SELECTOR PLAN 2
hx of recent copd exacerbation discharged home with prednisone 10  - concern for copd exacerbation  - c/w prednisone 40mg x 5 days  - duoneb standing
hx of recent copd exacerbation discharged home with prednisone 10  - concern for copd exacerbation  - c/w prednisone 40mg  - duoneb standing
hx of recent copd exacerbation discharged home with prednisone 10  - concern for copd exacerbation  - completed prednisone 40mg x 5 days  - duoneb standing

## 2022-07-21 NOTE — DISCHARGE NOTE NURSING/CASE MANAGEMENT/SOCIAL WORK - NSDCDMETYPESERV_GEN_ALL_CORE_FT
AVAP will be delivered to home today 7/21. Call them once home to schedule respiratory therapist to provide teaching visit today. Company to call [employer] to confirm delivery.

## 2022-07-21 NOTE — PROGRESS NOTE ADULT - PROBLEM SELECTOR PLAN 1
Presented with acute on chronic hypoxia as well as hypercapnia possibly 2/2 copd vs pna  - CTA chest negative for PE however shows RLL opacities concerning for aspiration PNA  - c/w zosyn x 5 days   - see tx for copd below  - on home O2 2L    - duoneb ATC, spiriva
Presented with acute on chronic hypoxia as well as hypercapnia possibly 2/2 copd vs pna  - CTA chest negative for PE however shows RLL opacities concerning for aspiration PNA  - c/w zosyn x 5 days - complete 7/21  - see tx for copd below  - on home O2 2L    - duoneb ATC, spiriva  - D/w Dr. Simon - can limit course of abx, this is likely not a new PNA and was treated last admission. He will assist w/ setting up AVAPs at home
Presented with acute on chronic hypoxia as well as hypercapnia possibly 2/2 copd vs pna  - CTA chest negative for PE however shows RLL opacities concerning for aspiration PNA  - completed course of Zosyn   - see tx for copd below  - on home O2 2L    - duoneb ATC, spiriva  - AVAPs to be delivered   Palliative consult appreciated. DNR/DNI, MOLST completed, hospice referral placed
Presented with acute on chronic hypoxia as well as hypercapnia possibly 2/2 copd vs pna  - CTA chest negative for PE however shows RLL opacities concerning for aspiration PNA  - c/w zosyn  - see tx for copd below  - weaned down  to home O2 2L this am  - duoneb ATC, spiriva
Presented with acute on chronic hypoxia as well as hypercapnia possibly 2/2 copd vs pna  - CTA chest negative for PE however shows RLL opacities concerning for aspiration PNA  - c/w zosyn x 5 days - complete 7/21  - see tx for copd below  - on home O2 2L    - duoneb ATC, spiriva  - D/w Dr. Simon today. AVAPs set up   Patient does not want rehospitalization. Will d/w family about hospice

## 2022-07-21 NOTE — CONSULT NOTE ADULT - PROBLEM SELECTOR RECOMMENDATION 2
- 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, small hiatal hernia with nonobstructed Schatzki's ring at the level of the GE junction, mod gastroesophageal reflux, abnormal esophageal motility  - Pt to resume diet as tolerated upon return to home

## 2022-07-21 NOTE — CONSULT NOTE ADULT - PROBLEM SELECTOR RECOMMENDATION 9
In setting of COPD and recent exacerbation, management per pulm/primary team with arrangement for AVAPS at home

## 2022-07-21 NOTE — PROGRESS NOTE ADULT - PROBLEM SELECTOR PLAN 6
- c/w home atorvastatin 40mg qhs

## 2022-07-21 NOTE — CONSULT NOTE ADULT - CONVERSATION DETAILS
Spoke with son Luis Alfredo on the phone first. Luis Alfredo was clear in wanting pt discharged as soon as today. He would like for pt to return home and not come back to the hospital. He is open to setting up hospice but does not want that process to hold up pt's discharge.     I then spoke with pt, who confirms that he does not wish for any further hospitalizations. He would like to go home, stay at home for whatever that is to come. Advised for consideration of hospice services and pt was amenable after I explained what it entails. A MOLST form was reviewed and pt elected DNR, DNI, no feeding tube and no hospitalization.     Case d/w CM and primary team, advise for initiation of hospice referral so hospice can reach out to the family even after discharge to discuss their services.

## 2022-07-21 NOTE — PROGRESS NOTE ADULT - PROBLEM SELECTOR PROBLEM 1
Acute respiratory failure with hypoxia and hypercapnia

## 2022-07-21 NOTE — CONSULT NOTE ADULT - SUBJECTIVE AND OBJECTIVE BOX
[facility] - [facility]  CONSULT NOTE    CHIEF COMPLAINT:  SOB, hypoxia    HPI: Mr. Delacruz in an 87 year old male, and is welll known to our group, followed by Dr. Juan Manuel Modi in our office.  He was admitted on July 7 when he presented to the ER in very severe respiratory distress, not alleviated by multiple albuterol treatments.  He was found to be in acute respiratory failure, he was started  Bipap and AVASP, but remained extremely dyspnea and required intubation and neuromuscular relaxation. Of note, he was found to be Covid negative, a bedside echo reported showed no concerning abnormalities and his CxR showed no infiltrates or effusions.  He is an ex-smoker with a history of COPD, and has been maintained on Brovana and Yupelri via nebulizer along with an albuterol hfa inhaler as an outpatient.  His symptoms of dyspnea can rapidly worsen at times.  He has a history of multiple other medical problems including coronary artery disease.   He was last in our office in April of this year and he was in a wheelchair.      He was sent home 2 days ago on home oxygen, prednisone taper and nebulizer therapy.  The following morning the visiting nurse came to assess and found his saturations low despite being on oxygen.  An ambulance was called and patient came back to the ER where found to be hypercapneic and hypoxc, treated with steroids, nebs and niv.  He is now better.  He has mucus and a cough.  He denies fever, chills, wheezing or chest pain at this time.  No sore throat, hemoptysis, choking or difficulty swallowing.  No one at home is sick.  It is unclear why this occurred.  Of note he does have severe emphysema and perhaps even agitation will make him short of breath and retain pC02.      PMHX:  HTN (hypertension)    COPD mixed type        PSHX:  No significant past surgical history        FAMILY HISTORY:  No pertinent family history in first degree relatives        SOCIAL HISTORY:    Pulmonary Medications:   albuterol/ipratropium for Nebulization 3 milliLiter(s) Nebulizer every 6 hours  tiotropium 18 MICROgram(s) Capsule 1 Capsule(s) Inhalation daily      Antimicrobials:  piperacillin/tazobactam IVPB.. 3.375 Gram(s) IV Intermittent every 8 hours      Cardiology:  amLODIPine   Tablet 10 milliGRAM(s) Oral daily      Other:  acetaminophen     Tablet .. 650 milliGRAM(s) Oral every 6 hours PRN  aspirin  chewable 81 milliGRAM(s) Oral daily  atorvastatin 40 milliGRAM(s) Oral at bedtime  enoxaparin Injectable 40 milliGRAM(s) SubCutaneous every 24 hours  lactated ringers. 1000 milliLiter(s) IV Continuous <Continuous>  pantoprazole    Tablet 40 milliGRAM(s) Oral before breakfast  predniSONE   Tablet 40 milliGRAM(s) Oral daily      Allergies    No Known Allergies    Intolerances        HOME MEDICATIONS:    REVIEW OF SYSTEMS: (Negative unless otherwise delineated)     Constitutional: No fevers, chills, sweats. weight loss, fatigue, weakness, malaise, lethargy  Eyes: No itching or discharge from the eyes, visual change, blurred vision, double vision, yellow sclerae, eye pain.  ENT: No ear pain, ear discharge, tinnitus, change in hearing, nasal congestion, runny nose, post nasal drip, epistaxis, sinus pain, sore throat, globus sensation, dental problems, oral ulcers/lesions  CV: No chest pain, chest pressure, chest discomfort, palpitations, lightheadedness/dizziness, syncope, lower extremity edema, inability to lay flat to sleep, awakening from sleep unable to sleep, claudication.  Resp: positive for dyspnea at rest, dyspnea on exertion, chest congestion and wheeze with cough and sputum production, no chest pain with respiration, hemoptysis  GI: No anorexia, nausea, vomiting, constipation, abdominal pain. blood in the stool, diarrhea, melena, change in bowel habits or stools, dysphagia, odynophagia, heartburn  : No burning on urination, urinary urgency, urinary frequency, urinary hesitancy, incontinence, blood in the urine, waking up at night to urinate, change in urine color, urinary retention.   Neurological: No headache, dizziness, syncope, paralysis, unsteady gait, muscle weakness, change in bowel or bladder control, numbness or tingling in the extremities, change in smell or taste, change in speech, tremor, memory change/loss, vertigo, frequent falls, confusion  MSK: No muscle pain, back pain, joint pain, joint stiffness, joint swelling   Hematologic: No anemia, bleeding, bruising, ecchymoses.   Lymphatics: No enlarged nodes, history of splenectomy  Psychiatric: No depression, anxiety, bipolar disorde, schizophrenia, hallucinations, suicidal/homicidal thoughts  Endocrinologic: No weight change, sweating, cold or heat intolerance, polyuria, polydipsia, polyphagia, abnormal hair growth, change in nails, tremor, neck pain or swelling.   Allergies: No hives, eczema, allergic rhinitis  Integumentary: No rash, new/growing/changing skin lesions, bruising, pruritis, decubiti                                                                                                                                                                                     OBJECTIVE:      ICU Vital Signs Last 24 Hrs  T(C): 36.7 (17 Jul 2022 12:32), Max: 37.2 (16 Jul 2022 15:14)  T(F): 98 (17 Jul 2022 12:32), Max: 99 (16 Jul 2022 15:14)  HR: 67 (17 Jul 2022 12:32) (67 - 110)  BP: 100/49 (17 Jul 2022 12:32) (100/49 - 138/94)  BP(mean): 84 (16 Jul 2022 22:45) (81 - 84)  ABP: --  ABP(mean): --  RR: 18 (17 Jul 2022 12:32) (18 - 28)  SpO2: 100% (17 Jul 2022 12:32) (96% - 100%)    O2 Parameters below as of 17 Jul 2022 12:32  Patient On (Oxygen Delivery Method): room air            I&O's Detail    16 Jul 2022 07:01  -  17 Jul 2022 07:00  --------------------------------------------------------  IN:  Total IN: 0 mL    OUT:    Voided (mL): 300 mL  Total OUT: 300 mL    Total NET: -300 mL        Daily Height in cm: 175.26 (16 Jul 2022 15:05)    Daily   CAPILLARY BLOOD GLUCOSE          PHYSICAL EXAM:  General: Awake, alert, cooperative, no distress, appears stated age , cachectic,   Head: Atraumatic, normocephalic  Back: Symmetric, no curvature, range of motion normal   Chest wall: No tenderness or deformity, barrel chested  Respiratory: decreased breath sounds throughout  Cardiovascular: Regular rate and rhythm, S1 S2 normal. No murmurs, rubs or gallops.   Abdomen: Soft, non-tender, non-distended. No organomegaly. No masses. Normal bowel sounds  Extremities: Warm to touch. No clubbing or cyanosis. No pedal edema.  Skin: Normal skin color, texture and turgor. No rashes or lesions  Neurological: Grossly intact A and O x 3  Psychiatry: Appropriate mood and affect.    LABS:                        15.6   15.80 )-----------( 287      ( 16 Jul 2022 15:45 )             47.3     07-16    138  |  89<L>  |  18  ----------------------------<  137<H>  4.8   |  32<H>  |  0.63    Ca    9.6      16 Jul 2022 15:45    TPro  7.2  /  Alb  3.9  /  TBili  1.2  /  DBili  x   /  AST  34  /  ALT  24  /  AlkPhos  69  07-16    PT/INR - ( 16 Jul 2022 15:45 )   PT: 12.3 sec;   INR: 1.07 ratio         PTT - ( 16 Jul 2022 15:45 )  PTT:29.7 sec    Venous Blood Gas:  07-17 @ 06:00  7.35/74/39/41/60.5  VBG Lactate: 1.9    ABG - ( 16 Jul 2022 15:32 )  pH, Arterial: 7.45  pH, Blood: x     /  pCO2: 62    /  pO2: 40    / HCO3: 43    / Base Excess: 15.5  /  SaO2: 63.5                MICROBIOLOGY:     RADIOLOGY:  [ x ] Reviewed and interpreted by me  < from: Xray Chest 1 View- PORTABLE-Urgent (07.16.22 @ 17:06) >  ACC: [id number] EXAM:  XR CHEST PORTABLE URGENT 1V                          PROCEDURE DATE:  07/16/2022          INTERPRETATION:  EXAMINATION: XR CHEST URGENT    CLINICAL INDICATION: Shortness of Breath    TECHNIQUE: Single frontal, portable view of the chest was obtained.    COMPARISON: Chest x-ray from 1/12/2022.    FINDINGS:  Lungs appear hyperaerated but are otherwise clear.  Heart size cannot be accurately assessed on this projection.  There is no pneumothorax or pleural effusion.  No acute bony abnormality.    IMPRESSION:  Clear lungs.    --- End of Report ---          LAKE MEADOWS MD; Resident Radiologist  This document has been electronically signed.  JAVAN HUSSEIN MD; Attending Interventional Radiologist  This document has been electronically signed. Jul 17 2022 11:37AM    < end of copied text >  < from: CT Angio Chest PE Protocol w/ IV Cont (07.16.22 @ 23:18) >  ACC: [id number] EXAM:  CT ANGIO CHEST PULM NELLA SHIN                          PROCEDURE DATE:  07/16/2022          INTERPRETATION:  CLINICAL INFORMATION: Hypoxia    COMPARISON: None.    CONTRAST/COMPLICATIONS:  IV Contrast: Omnipaque 350  90 cc administered   0 cc discarded  Oral Contrast: NONE  Complications: None reported at time of study completion    PROCEDURE:  CT Angiography of the Chest.  Sagittal and coronal reformats were performed as well as 3D (MIP)   reconstructions.    FINDINGS:    LUNGS AND AIRWAYS: Patent central airways.  Emphysematous changes are   present in both lungs. Patchy  opacities in the right lower lobe.   Calcified granulomas in the left upper lobe measuring up to 3 mm..  PLEURA: Small right pleural effusion.  MEDIASTINUM AND KAMILAH: No lymphadenopathy.  VESSELS: Pulmonary arteries are normal in caliber. No filling defects are   noted..  HEART: Heart size is normal. No pericardial effusion.  CHEST WALL AND LOWER NECK: Within normal limits.  VISUALIZED UPPER ABDOMEN: Within normal limits.  BONES: Degenerative changes.    IMPRESSION:  No pulmonary embolism  Patchy opacities are noted in the right lower lobe are of uncertain   etiology. Follow-up CT scan is recommended in 3 months to ensure   resolution.    --- End of Report ---          KINJAL KAUR MD; Resident Radiologist  This document has been electronically signed.  CHRISTIAN MONTGOMERY MD; Attending Radiologist  This document has been electronically signed. Jul 17 2022  9:24AM    < end of copied text >     
HPI:  87M with PMH of COPD, HTN, HLD presents to the ED with hypoxia. History obtained from patient and grandson at bedside. Patient was recently admitted to the hospital for COPD exacerbation, briefly intubated for hypercarbic respiratory failure requiring MICU stay, and discharged home yesterday. Patient was discharged home on 2L nasal cannula. This morning, patient was found to be hypoxic to the 70s-80s on room air by visiting nurse and sent to the ED. Patient had some shortness of breath when he woke up this morning. Otherwise he denies any fevers, chills, cough, or chest pain. Patient denies any nausea, vomiting, abdominal pain, diarrhea, or dysuria. No melena or hematochezia. Patient is frustrated about being in the hospital again.     In the ED, T is 99, , /94, RR 27 satting 96% on 6L NC. Patient placed on bipap for increased work of breathing, respiratory distress. Patient received solumedrol 125mg IVX1, duoneb X3.  (16 Jul 2022 21:44)    Consult HPI: pt seen in room this morning, awake and appropriate in conversation. Son Luis Alfredo on the phone. Both pt and son are focused on going home as soon as possible. Please see GOC section below for further discussion details.     PERTINENT PM/SXH:   HTN (hypertension)    COPD mixed type    No significant past surgical history    FAMILY HISTORY:    Family Hx substance abuse [ ]yes [ ]no  ITEMS NOT CHECKED ARE NOT PRESENT    SOCIAL HISTORY:   Significant other/partner[ ]  Children[x ]  Islam/Spirituality:  Substance hx:  [ ]   Tobacco hx:  [ ]   Alcohol hx: [ ]   Home Opioid hx:  [ ] I-Stop Reference No:  Living Situation: [ x]Home  [ ]Long term care  [ ]Rehab [ ]Other    ADVANCE DIRECTIVES:    DNR/MOLST  [ ]  Living Will  [ ]   DECISION MAKER(s):  [ ] Health Care Proxy(s)  [x ] Surrogate(s)  [ ] Guardian           Name(s): Phone Number(s): michel Lobato     BASELINE (I)ADL(s) (prior to admission):  Tehama: [ ]Total  [x ] Moderate [ ]Dependent    Allergies    No Known Allergies    Intolerances    MEDICATIONS  (STANDING):  albuterol/ipratropium for Nebulization 3 milliLiter(s) Nebulizer every 6 hours  amLODIPine   Tablet 10 milliGRAM(s) Oral daily  aspirin  chewable 81 milliGRAM(s) Oral daily  atorvastatin 40 milliGRAM(s) Oral at bedtime  buDESOnide    Inhalation Suspension 0.5 milliGRAM(s) Inhalation two times a day  enoxaparin Injectable 40 milliGRAM(s) SubCutaneous every 24 hours  pantoprazole    Tablet 40 milliGRAM(s) Oral before breakfast  piperacillin/tazobactam IVPB.. 3.375 Gram(s) IV Intermittent every 8 hours  predniSONE   Tablet 40 milliGRAM(s) Oral daily    MEDICATIONS  (PRN):  acetaminophen     Tablet .. 650 milliGRAM(s) Oral every 6 hours PRN Temp greater or equal to 38C (100.4F), Mild Pain (1 - 3), Moderate Pain (4 - 6)    PRESENT SYMPTOMS: [ ]Unable to self-report  [ ] CPOT [ ] PAINADs [ ] RDOS  Source if other than patient:  [ ]Family   [ ]Team     Pain: [ ]yes [x ]no  QOL impact -   Location -                    Aggravating factors -  Quality -  Radiation -  Timing-  Severity (0-10 scale):  Minimal acceptable level (0-10 scale):     CPOT:    https://www.s[facility].org/getattachment/drp95p78-3s4q-5c2f-6m6c-6784m3037w9r/Critical-Care-Pain-Observation-Tool-(CPOT)    PAIN AD Score:   http://geriatrictoolkit.[email or web address]/cog/painad.pdf (press ctrl +  left click to view)    Dyspnea:                           [x]Mild [ ]Moderate [ ]Severe      RDOS:  0 to 2  minimal or no respiratory distress   3  mild distress  4 to 6 moderate distress  >7 severe distress  https://homecareinformation.net/handouts/hen/Respiratory_Distress_Observation_Scale.pdf (Ctrl +  left click to view)     Anxiety:                             [ ]Mild [ ]Moderate [ ]Severe  Fatigue:                             [ ]Mild [ ]Moderate [ ]Severe  Nausea:                             [ ]Mild [ ]Moderate [ ]Severe  Loss of appetite:              [ ]Mild [ ]Moderate [ ]Severe  Constipation:                    [ ]Mild [ ]Moderate [ ]Severe    PCSSQ[Palliative Care Spiritual Screening Question]   Severity (0-10):  Score of 4 or > indicate consideration of Chaplaincy referral.  Chaplaincy Referral: [ ] yes [ ] refused [ ] following    Other Symptoms:  [x ]All other review of systems negative     Palliative Performance Status Version 2:     50    %    http://[email or web address].org/files/news/palliative_performance_scale_ppsv2.pdf    PHYSICAL EXAM:  Vital Signs Last 24 Hrs  T(C): 36.7 (21 Jul 2022 09:14), Max: 37.1 (21 Jul 2022 00:05)  T(F): 98.1 (21 Jul 2022 09:14), Max: 98.7 (21 Jul 2022 00:05)  HR: 78 (21 Jul 2022 09:14) (70 - 86)  BP: 110/62 (21 Jul 2022 09:14) (110/62 - 139/65)  BP(mean): --  RR: 18 (21 Jul 2022 09:14) (18 - 18)  SpO2: 99% (21 Jul 2022 09:14) (97% - 100%)    Parameters below as of 21 Jul 2022 09:14  Patient On (Oxygen Delivery Method): nasal cannula     I&O's Summary    20 Jul 2022 07:01  -  21 Jul 2022 07:00  --------------------------------------------------------  IN: 340 mL / OUT: 825 mL / NET: -485 mL      GENERAL: [ ]Cachexia    [ x]Alert  [x ]Oriented x3   [ ]Lethargic  [ ]Unarousable  [ x]Verbal  [ ]Non-Verbal  Behavioral:   [ ] Anxiety  [ ] Delirium [ ] Agitation [ ] Other  HEENT:  [x ]Normal   [ ]Dry mouth   [ ]ET Tube/Trach  [ ]Oral lesions  PULMONARY:   [ ]Clear [ ]Tachypnea  [ ]Audible excessive secretions   [ ]Rhonchi        [ ]Right [ ]Left [ ]Bilateral  [ ]Crackles        [ ]Right [ ]Left [ ]Bilateral  [ ]Wheezing     [ ]Right [ ]Left [ ]Bilateral  [x ]Diminished breath sounds [ ]right [ ]left [x ]bilateral  CARDIOVASCULAR:    [ x]Regular [ ]Irregular [ ]Tachy  [ ]Demarco [ ]Murmur [ ]Other  GASTROINTESTINAL:  [ x]Soft  [ ]Distended   [ ]+BS  [x ]Non tender [ ]Tender  [ ]Other [ ]PEG [ ]OGT/ NGT  Last BM:  GENITOURINARY:  [x ]Normal [ ] Incontinent   [ ]Oliguria/Anuria   [ ]Lozano  MUSCULOSKELETAL:   [x ]Normal   [ ]Weakness  [ ]Bed/Wheelchair bound [ ]Edema  NEUROLOGIC:   [x ]No focal deficits  [ ]Cognitive impairment  [ ]Dysphagia [ ]Dysarthria [ ]Paresis [ ]Other   SKIN:   [ ]Normal  [ ]Rash  [ ]Other  [ ]Pressure ulcer(s)       Present on admission [ ]y [ ]n    CRITICAL CARE:  [ ] Shock Present  [ ]Septic [ ]Cardiogenic [ ]Neurologic [ ]Hypovolemic  [ ]  Vasopressors [ ]  Inotropes   [ ]Respiratory failure present [ ]Mechanical ventilation [ ]Non-invasive ventilatory support [ ]High flow    [ ]Acute  [ ]Chronic [ ]Hypoxic  [ ]Hypercarbic [ ]Other  [ ]Other organ failure     LABS:                        12.7   13.27 )-----------( 247      ( 20 Jul 2022 09:16 )             37.6       RADIOLOGY & ADDITIONAL STUDIES:  < from: Xray Cinesophagram Swallow Function w/ Contrast (07.19.22 @ 11:48) >  IMPRESSION:    Evidence of penetration and aspiration with the tested consistencies.    For further information and recommendations, please refer to the speech   pathologist final report which is available for review in the electronic   medical record.    --- End of Report ---    < end of copied text >  < from: CT Angio Chest PE Protocol w/ IV Cont (07.16.22 @ 23:18) >  FINDINGS:    LUNGS AND AIRWAYS: Patent central airways.  Emphysematous changes are   present in both lungs. Patchy  opacities in the right lower lobe.   Calcified granulomas in the left upper lobe measuring up to 3 mm..  PLEURA: Small right pleural effusion.  MEDIASTINUM AND KAMILAH: No lymphadenopathy.  VESSELS: Pulmonary arteries are normal in caliber. No filling defects are   noted..  HEART: Heart size is normal. No pericardial effusion.  CHEST WALL AND LOWER NECK: Within normal limits.  VISUALIZED UPPER ABDOMEN: Within normal limits.  BONES: Degenerative changes.    IMPRESSION:  No pulmonary embolism  Patchy opacities are noted in the right lower lobe are of uncertain   etiology. Follow-up CT scan is recommended in 3 months to ensure   resolution.    --- End of Report ---    < end of copied text >      PROTEIN CALORIE MALNUTRITION PRESENT: [ ]mild [ ]moderate [ ]severe [ ]underweight [ ]morbid obesity  https://www.andeal.org/vault/2440/web/files/ONC/Table_Clinical%20Characteristics%20to%20Document%20Malnutrition-White%20JV%20et%20al%202012.pdf    Height (cm): 175.3 (07-16-22 @ 15:05), 175.3 (07-10-22 @ 15:09), 172.7 (10-18-21 @ 11:10)  Weight (kg): 40 (07-10-22 @ 15:09), 50.1 (10-18-21 @ 11:10)  BMI (kg/m2): 13 (07-16-22 @ 15:05), 13 (07-10-22 @ 15:09), 16.8 (10-18-21 @ 11:10)    [ ]PPSV2 < or = to 30% [ ]significant weight loss  [ ]poor nutritional intake  [ ]anasarca[ ]Artificial Nutrition      Other REFERRALS:  [ ]Hospice  [ ]Child Life  [ ]Social Work  [ ]Case management [ ]Holistic Therapy

## 2022-07-21 NOTE — PROGRESS NOTE ADULT - SUBJECTIVE AND OBJECTIVE BOX
[facility] Division of Hospital Medicine  Sunshine Vigil DO   Available via [employer] Teams      Patient is a 87y old  Male who presents with a chief complaint of hypoxia (21 Jul 2022 11:44)      SUBJECTIVE / OVERNIGHT EVENTS:  Feels well. Wants to go home     MEDICATIONS  (STANDING):  albuterol/ipratropium for Nebulization 3 milliLiter(s) Nebulizer every 6 hours  amLODIPine   Tablet 10 milliGRAM(s) Oral daily  aspirin  chewable 81 milliGRAM(s) Oral daily  atorvastatin 40 milliGRAM(s) Oral at bedtime  buDESOnide    Inhalation Suspension 0.5 milliGRAM(s) Inhalation two times a day  enoxaparin Injectable 40 milliGRAM(s) SubCutaneous every 24 hours  pantoprazole    Tablet 40 milliGRAM(s) Oral before breakfast  piperacillin/tazobactam IVPB.. 3.375 Gram(s) IV Intermittent every 8 hours  predniSONE   Tablet 40 milliGRAM(s) Oral daily    MEDICATIONS  (PRN):  acetaminophen     Tablet .. 650 milliGRAM(s) Oral every 6 hours PRN Temp greater or equal to 38C (100.4F), Mild Pain (1 - 3), Moderate Pain (4 - 6)      CAPILLARY BLOOD GLUCOSE        I&O's Summary    20 Jul 2022 07:01  -  21 Jul 2022 07:00  --------------------------------------------------------  IN: 340 mL / OUT: 825 mL / NET: -485 mL    21 Jul 2022 07:01  -  21 Jul 2022 15:25  --------------------------------------------------------  IN: 520 mL / OUT: 0 mL / NET: 520 mL        PHYSICAL EXAM:  Vital Signs Last 24 Hrs  T(C): 36.3 (21 Jul 2022 12:55), Max: 37.1 (21 Jul 2022 00:05)  T(F): 97.3 (21 Jul 2022 12:55), Max: 98.7 (21 Jul 2022 00:05)  HR: 65 (21 Jul 2022 12:55) (65 - 82)  BP: 124/63 (21 Jul 2022 12:55) (110/62 - 139/65)  BP(mean): --  RR: 18 (21 Jul 2022 12:55) (18 - 18)  SpO2: 100% (21 Jul 2022 12:55) (98% - 100%)    Parameters below as of 21 Jul 2022 12:55  Patient On (Oxygen Delivery Method): nasal cannula      CONSTITUTIONAL: NAD, well-developed, thin   ENMT: Moist oral mucosa  RESPIRATORY: Normal respiratory effort; lungs are clear to auscultation bilaterally  CARDIOVASCULAR: Regular rate and rhythm, normal S1 and S2; No lower extremity edema  ABDOMEN: Nontender to palpation, normoactive bowel sounds, no rebound/guarding  MUSCULOSKELETAL: no clubbing or cyanosis of digits; no joint swelling or tenderness to palpation  PSYCH: A+O to person, place, and time; affect appropriate  SKIN: No rashes; no palpable lesions      LABS:                        12.7   13.27 )-----------( 247      ( 20 Jul 2022 09:16 )             37.6                       RADIOLOGY & ADDITIONAL TESTS:  Results Reviewed:   Imaging Personally Reviewed:  Electrocardiogram Personally Reviewed:    COORDINATION OF CARE:  Care Discussed with Consultants/Other Providers [Y/N]:  Prior or Outpatient Records Reviewed [Y/N]:

## 2022-07-21 NOTE — PROGRESS NOTE ADULT - SUBJECTIVE AND OBJECTIVE BOX
[facility] - PROGRESS NOTE    CHIEF COMPLAINT: acute on chronic hypoxic respiratory failure; chronic hypercapnic respiratory failure; COPD; emphysema; aspiration pneumonia    INTERVAL HISTORY: discharge pending arrival of his home portable oxygen device, institution of home hospice services and approval of his AVAPS device; ambulating in the hallway with a rolling walker without dyspnea or hypoxemia on a 2lpm nasal canula; no cough, sputum production, hemoptysis, chest congestion or wheeze; no fevers, chills or sweats; no chest pain/pressure or palpitations; oropharyngeal dysphagia with evidence of penetration and aspiration with the tested consistencieson MBS (the patient wishes to continue a regular diet) - esophagram -> prominent aortic knob demonstrates mass effect upon the proximal esophagus - small hiatal hernia with nonobstructing Schatzki's ring at the level of the GE junction - moderate gastroesophageal reflux - abnormal esophageal motility    REVIEW OF SYSTEMS:  Constitutional: As per interval history  HEENT: Within normal limits  CV: As per interval history  Resp: As per interval history  GI: dysphagia  : Within normal limits  Musculoskeletal: Within normal limits  Skin: Within normal limits  Neurological: Within normal limits  Psychiatric: depression  Endocrine: Within normal limits  Hematologic/Lymphatic: Within normal limits  Allergic/Immunologic: Within normal limits    MEDICATIONS:     Pulmonary "  albuterol/ipratropium for Nebulization 3 milliLiter(s) Nebulizer every 6 hours  buDESOnide    Inhalation Suspension 0.5 milliGRAM(s) Inhalation two times a day    Anti-microbials:  piperacillin/tazobactam IVPB.. 3.375 Gram(s) IV Intermittent every 8 hours    Cardiovascular:  amLODIPine   Tablet 10 milliGRAM(s) Oral daily    Other:  aspirin  chewable 81 milliGRAM(s) Oral daily  atorvastatin 40 milliGRAM(s) Oral at bedtime  enoxaparin Injectable 40 milliGRAM(s) SubCutaneous every 24 hours  pantoprazole    Tablet 40 milliGRAM(s) Oral before breakfast  predniSONE   Tablet 40 milliGRAM(s) Oral daily    MEDICATIONS  (PRN):  acetaminophen     Tablet .. 650 milliGRAM(s) Oral every 6 hours PRN Temp greater or equal to 38C (100.4F), Mild Pain (1 - 3), Moderate Pain (4 - 6)    OBJECTIVE:    PHYSICAL EXAM:       ICU Vital Signs Last 24 Hrs  T(C): 36.7 (21 Jul 2022 09:14), Max: 37.1 (21 Jul 2022 00:05)  T(F): 98.1 (21 Jul 2022 09:14), Max: 98.7 (21 Jul 2022 00:05)  HR: 78 (21 Jul 2022 09:14) (70 - 86)  BP: 110/62 (21 Jul 2022 09:14) (110/62 - 139/65)  BP(mean): --  ABP: --  ABP(mean): --  RR: 18 (21 Jul 2022 09:14) (18 - 18)  SpO2: 99% (21 Jul 2022 09:14) (97% - 100%) on 2lpm nasal canula    General: Awake. Alert. Cooperative. No distress. Appears stated age. Weak. Frail. Cachectic.	  HEENT: Atraumatic. Bitemporal wasting. Anicteric. Normal oral mucosa. PERRL. EOMI.  Neck: Supple. Trachea midline. Thyroid without enlargement/tenderness/nodules. No carotid bruit. No JVD. Loss of bilateral supraclavicular fat pads.	  Cardiovascular: Regular rate and rhythm. S1 S2 normal. No murmurs, rubs or gallops.  Respiratory: Respirations unlabored. Decreased breath sounds throughout. Resolved basilar rales. No wheeze. Kyphosis.  Abdomen: Soft. Non-tender. Non-distended. No organomegaly. No masses. Normal bowel sounds.  Extremities: Warm to touch. No clubbing or cyanosis. No pedal edema.  Pulses: 2+ peripheral pulses all extremities.	  Skin: Normal skin color. No rashes or lesions. No ecchymoses. No cyanosis. Warm to touch.  Lymph Nodes: Cervical, supraclavicular and axillary nodes normal  Neurological: Motor and sensory examination equal and normal. A and O x 3  Psychiatry: Depressed    LABS:                          12.7   13.27 )-----------( 247      ( 20 Jul 2022 09:16 )             37.6     CBC    WBC  13.27 <==, 13.23 <==, 15.80 <==    Hemoglobin  12.7 <<==, 12.6 <<==, 15.6 <<==    Hematocrit  37.6 <==, 37.7 <==, 47.3 <==    Platelets  247 <==, 237 <==, 287 <==      138  |  89<L>  |  18  ----------------------------<  137<H>    07-16  4.8   |  32<H>  |  0.63      LYTES    sodium  138 <==    potassium   4.8 <==    chloride  89 <==    carbon dioxide  32 <==    =============================================================================================  RENAL FUNCTION:    Creatinine:   0.63  <<==    BUN:   18 <==    ============================================================================================    calcium   9.6 <==    ===========================================================================================  LFTs    AST:   34 <==     ALT:  24  <==     AP:  69  <=    Bili:  1.2  <=    PT/INR - ( 16 Jul 2022 15:45 )   PT: 12.3 sec;   INR: 1.07 ratio      Venous Blood Gas:  07-19 @ 06:17  7.40/63/61/39/93.3  VBG Lactate: 1.2    Venous Blood Gas:  07-17 @ 06:00  7.35/74/39/41/60.5  VBG Lactate: 1.9    ABG - ( 16 Jul 2022 15:32 )  pH: 7.45  /  pCO2: 62    /  pO2: 40    / HCO3: 43    / Base Excess: 15.5  /  SaO2: 63.5      Blood Gas Profile - Venous (07.07.22 @ 08:10)   pH, Venous: 7.18   pCO2, Venous: 108 mmHg   pO2, Venous: 55 mmHg   HCO3, Venous: 40 mmol/L   Base Excess, Venous: 8.2 mmol/L   Oxygen Saturation, Venous: 81.2 %   Total CO2, Venous: 44 mmol/L   Blood Gas Source Venous: Venous     Procalcitonin, Serum: 0.06 ng/mL (07-16 @ 15:45)    Serum Pro-Brain Natriuretic Peptide: 374 pg/mL (07-16 @ 15:45)    CARDIAC MARKERS ( 16 Jul 2022 23:33 )  CPK x     /CKMB x     /CKMB Units x        troponin 15 ng/L    CARDIAC MARKERS ( 16 Jul 2022 15:45 )  CPK x     /CKMB x     /CKMB Units x        troponin 15 ng/L      MICROBIOLOGY:     Respiratory Viral Panel with COVID-19 by NICKIE (07.16.22 @ 15:45)   Rapid RVP Result: [facility]   SARS-CoV-2: [facility]   This Respiratory Panel uses polymerase chain reaction (PCR) to detect for   adenovirus; coronavirus (HKU1, NL63, 229E, OC43); human metapneumovirus   (hMPV); human enterovirus/rhinovirus (Entero/RV); influenza A; influenza   A/H1; influenza A/H3; influenza A/H1-2009; influenza B; parainfluenza   viruses 1, 2, 3, 4; respiratory syncytial virus; Mycoplasma pneumoniae;   Chlamydophila pneumoniae; and SARS-CoV-2.     Culture - Sputum . (07.08.22 @ 07:05)   Culture Results:   Normal Respiratory Maggi present     MRSA/MSSA PCR (07.07.22 @ 13:46)   MRSA PCR Result.: [facility]:   Staph aureus PCR Result: Detected   MRSA/MSSA PCR assay is a qualitative in vitro diagnostic test for the   direct detection and differentiation of methicillin-resistant   Staphylococcus aureus (MRSA) from Staphylococcus aureus (SA). The assay   detects DNA from nasal swabs in patients atrisk for nasal colonization.   It is not intended to diagnose MRSA or SA infections nor guide or monitor   treatment for MRSA/SA infections. A negative result does not preclude   nasal colonization. The Real-Time PCR assay is FDA-approved, and its   performance has been established by [facility] [employer], [place], [place].     RADIOLOGY:  [x] Chest radiographs reviewed and interpreted by me    EXAM:  XR SWAL FUNC YONY VID CON STDY                          PROCEDURE DATE:  07/19/2022      FINDINGS:    There are penetration and aspiration the tested consistencies.    IMPRESSION:    Evidence of penetration and aspiration with the tested consistencies.    For further information and recommendations, please refer to the speech   pathologist final report which is available for review in the electronic   medical record.    APOLINAR RAMOS MD; Resident Radiology  This document has been electronically signed.  GIA VAZQUEZ MD; Attending Radiologist  This document has been electronically signed. Jul 19 2022 11:40PM  ---------------------------------------------------------------------------------------------------------------  EXAM:  XR CHEST PORTABLE URGENT 1V                          PROCEDURE DATE:  07/16/2022      FINDINGS:  Lungs appear hyperaerated but are otherwise clear.  Heart size cannot be accurately assessed on this projection.  There is no pneumothorax or pleural effusion.  No acute bony abnormality.    IMPRESSION:  Clear lungs.    LAKE MEADOWS MD; Resident Radiologist  This document has been electronically signed.  JAVAN HUSSEIN MD; Attending Interventional Radiologist  This document has been electronically signed. Jul 17 2022 11:37AM  ---------------------------------------------------------------------------------------------------------------    EXAM:  CT ANGIO CHEST PULM ART [facility]                          PROCEDURE DATE:  07/16/2022      FINDINGS:    LUNGS AND AIRWAYS: Patent central airways.  Emphysematous changes are   present in both lungs. Patchy  opacities in the right lower lobe.   Calcified granulomas in the left upper lobe measuring up to 3 mm..  PLEURA: Small right pleural effusion.  MEDIASTINUM AND KAMILAH: No lymphadenopathy.  VESSELS: Pulmonary arteries are normal in caliber. No filling defects are   noted..  HEART: Heart size is normal. No pericardial effusion.  CHEST WALL AND LOWER NECK: Within normal limits.  VISUALIZED UPPER ABDOMEN: Within normal limits.  BONES: Degenerative changes.    IMPRESSION:  No pulmonary embolism  Patchy opacities are noted in the right lower lobe are of uncertain   etiology. Follow-up CT scan is recommended in 3 months to ensure   resolution.    KINJAL KAUR MD; Resident Radiologist  This document has been electronically signed.  CHRISTIAN MONTGOMERY MD; Attending Radiologist  This document has been electronically signed. Jul 17 2022  9:24AM  ---------------------------------------------------------------------------------------------------------------  EXAM:  XR CHEST PORTABLE URGENT 1V                          PROCEDURE DATE:  07/12/2022      FINDINGS:    The lungs are hyperaerated clear.    Heart size is within normal limits.    No acute osseous findings.    IMPRESSION:    Clear lungs.    ALEXANDRIA LANGE MD; Resident Radiology  This document has been electronically signed.  GIA VAZQUEZ MD; Attending Radiologist  This document has been electronically signed. Jul 12 2022  9:42PM  ---------------------------------------------------------------------------------------------------------------  EXAM:  XR CHEST PORTABLE URGENT 1V                          PROCEDURE DATE:  07/07/2022        FINDINGS:    Interval placement of endotracheal tube which terminates in the mid   trachea.  Cardiac size is within normal limits.  The lungs are hyperaerated, but clear.  There are no pleural effusions. No pneumothorax.  Degenerative osseous changes.    IMPRESSION:  Endotracheal tube terminates mid trachea.    VEENA DESAI MD; Resident Radiology  This document has been electronically signed.  GIA VAZQUEZ MD; Attending Radiologist  This documenthas been electronically signed. Jul 7 2022 12:37PM  ---------------------------------------------------------------------------------------------------------------  EXAM:  XR CHEST PORTABLE URGENT 1V                          PROCEDURE DATE:  07/07/2022      FINDINGS:    The lungs are hyperaerated, but clear.  No pleural effusion or pneumothorax.  Heart size is within normal limits.  No acute abnormality within visible osseous structures.    IMPRESSION:    Clear lungs.    NEHA DOUGLAS MD; Resident Radiology  This document has been electronically signed.  GIA VAZQUEZ MD; Attending Radiologist  This document has been electronically signed. Jul 7 2022 11:46AM  ---------------------------------------------------------------------------------------------------------------  EXAM:  DUPLEX SCAN EXT VEINS LOWER BI                          PROCEDURE DATE:  07/11/2022      IMPRESSION:  No evidence of acute deep venous thrombosis in either lower extremity.  Peripheral echogenicity along the left gastrocnemius vein may be sequela   of old deep venous thrombosis.    JOEL RANDLE MD; Attending Radiologist  This document has been electronically signed. Jul 11 2022  6:06PM  ---------------------------------------------------------------------------------------------------------------

## 2022-07-21 NOTE — PROGRESS NOTE ADULT - ASSESSMENT
ASSESSMENT:    readmitted with acute on chronic hypoxic/hypercapnic respiratory failure felt to be due to a COPD exacerbation requiring treatment with NIV    recent admission for a COPD exacerbation following an aspiration event requiring a brief intubation for acute hypercapnic respiratory failure     oropharyngeal and esophageal dysphagia s/p EGD for food impaction in the distal esophagus     HTN/HLD/CAD    PLAN/RECOMMENDATIONS:    oxygen supplementation to keep saturation greater than 92% - he will need 2lpm via nasal canula at rest and 3 - 4lpm via nasal canula with exertion (Inogen device should be set at 4)  the patient has home oxygen supplies but does not know how to use them  VBG 7/19 -> 7.40/63/61/39/93.3 c/w chronic and well compensated respiratory acidosis  I have arranged for an AVAPS machine for discharge to prevent respiratory decompensations and recurrent admissions  CT scan reviewed -> patent central airways - emphysema - patchy  opacities in the right lower lobe I suspect represent resolving aspiration pneumonia from last week (no CT scan done during that admission)  prednisone 40mg daily x 5 days  albuterol/atrovent nebs q6h  pulmicort 0.5mg nebs q12h - give after duoneb - rinse mouth after use  discontinue spiriva inhaler which is redundant  discharge on his usual Brovana and Yupelri nebulizers and albuterol MDI as needed  encouraging secretion clearance, pulmonary toilet, etc  GI    esophagram -> prominent aortic knob demonstrates mass effect upon the proximal esophagus - small hiatal hernia with non-obstructing Schatzki's ring at the level of the GE junction - moderate gastroesophageal reflux - abnormal esophageal motility    MBS -> oropharyngeal dysphagia with evidence of penetration and aspiration with the tested consistencie0 on MBS     the patient and his son do not want to alter his diet  aspiration precautions    head of bed elevation at 90 degrees when eating    full assistance with meals    puree diet with thin liquids     small bites - no straws - chin tuck maneuver  cardiac meds: ASA/lipitor/norvasc  GI/DVT prophylaxis - protonix/SQ lovenox  out of bed and into the chair  ambulate as able    Will follow with you. Plan of care discussed with the patient at bedside and the dedicated floor NP. Discharge is pending arrival of his home portable oxygen device, institution of home hospice services and approval of his AVAPS device (he can leave without the AVAPS device    long conversation last night with the patient's son Luis Alfredo in [place] about Wade's medical conditions, plans of care and goals of care -> the patient is quite depressed over the recent loss of his wife of 65 years -> he would like to go home and continue treatment but does not want to return to the hospital -> they do not want any further invasive interventions - I suggested discharge with home hospice to which the son agreed -> they are not interested in modifying his diet;     The patient is again hospitalized with acute on chronic hypercapnic respiratory failure in the setting of a COPD exacerbation with a pCO2 level greater than 100 mmHg. He has recently required intubation. The patient's respiratory acidosis could not be controlled on BIPAP therapy. Therefore, I feel that the patient required NIV augmented volume ventilation not available on a standard BIPAP for home use to better control the patient's underlying severe obstructive lung disease. Without NIV, the patient's condition will continue to deteriorate leading to frequent readmissions and possible death. Please expedite arrangements for home BIV for the patient's anticipated discharge in the near future.        Aditya Simon MD, [facility]  [phone]  Pulmonary Medicine

## 2022-07-21 NOTE — DISCHARGE NOTE NURSING/CASE MANAGEMENT/SOCIAL WORK - NSDCPEFALRISK_GEN_ALL_CORE
For information on Fall & Injury Prevention, visit: [email or web address] OR  [email or web address] OR  https://www.cdc.gov/steadi/patient.html

## 2022-07-21 NOTE — PROGRESS NOTE ADULT - PROVIDER SPECIALTY LIST ADULT
Pulmonology
Internal Medicine
Pulmonology
Hospitalist

## 2022-07-21 NOTE — PROGRESS NOTE ADULT - PROBLEM SELECTOR PLAN 3
Leukocytosis to 15 in the setting of likely PNA  - CT chest concerning for PNA  - c/w zosyn for possible aspiration pna  - wbc improved. patient also on steroids
Leukocytosis to 15 in the setting of likely PNA  - CT chest concerning for PNA  - c/w zosyn for possible aspiration pna  - wbc improved. patient also on steroids
Leukocytosis to 15 in the setting of likley PNA  - CT chest concerning for PNA  - c/w zosyn for possible aspiration pna  - trend WBC
Leukocytosis to 15 in the setting of likely PNA  - CT chest concerning for PNA  - c/w zosyn for possible aspiration pna  - wbc improved. patient also on steroids
Leukocytosis to 15 in the setting of likley PNA  - CT chest concerning for PNA  - c/w zosyn for possible aspiration pna  - trend WBC

## 2022-07-21 NOTE — PROGRESS NOTE ADULT - PROBLEM SELECTOR PLAN 5
- c/w CCB  - monitor blood pressures

## 2022-07-21 NOTE — PROGRESS NOTE ADULT - PROBLEM SELECTOR PLAN 4
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, small hiatal hernia with nonobstructed Schatzki's ring at the level of the GE junction, mod gastroesophageal reflux, abnormal esophageal motility  - c/w pantoprazole  - S&S eval  - GI follow up outpt
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, small hiatal hernia with nonobstructed Schatzki's ring at the level of the GE junction, mod gastroesophageal reflux, abnormal esophageal motility  - c/w pantoprazole  - S&S eval noted   - GI follow up outpt - patient access to help make appointment w/ Dr. Biggs
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, small hiatal hernia with nonobstructed Schatzki's ring at the level of the GE junction, mod gastroesophageal reflux, abnormal esophageal motility  - c/w pantoprazole  - S&S eval  - GI follow up outpt - patient access to help make appointment w/ Dr. Biggs
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, small hiatal hernia with nonobstructed Schatzki's ring at the level of the GE junction, mod gastroesophageal reflux, abnormal esophageal motility  - c/w pantoprazole  - NPO for now, f/u S&S eval  - GI follow up outpt
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, small hiatal hernia with nonobstructed Schatzki's ring at the level of the GE junction, mod gastroesophageal reflux, abnormal esophageal motility  - c/w pantoprazole  - S&S eval noted   - GI follow up outpt - patient access to help make appointment w/ Dr. Biggs  - d/w Son to have outpatient follow up based upon GOC. Discussed rec diet was puree but if in line with GOC, can do pleasure feeds with the understanding of aspiration risk

## 2022-07-21 NOTE — CONSULT NOTE ADULT - ASSESSMENT
Impression:  COPD exacerbation again, just returned home after hospitalization in which he was intubated.  Cause of rapid decline unclear, he denies any aspiration episode, increased mucus or phlegm production.  He has been ruled out for a pulmonary embolism, he does have a right lower lobe patchy opacity which may represent aspiration pneumonia, patient was intubated about 10 days ago and can be an aspiration pneumonia.    Esophageal dysphagia s/p EGD for food impaction in the distal esophagus.  Not this time, although should be checked again.    HTN/HLD/CAD      Recommendations:  Continue oxygen supplementation to keep saturation greater than 92%, he now has oxygen at home.      Agree with zosyn day #2, await cultures and follow up cxr  Continue prednisone 40mg po daily, do not wish to go up at this time.  Continue pulmicort 0.5mg nebs q12h and duonebs.  Will d/c tiotropium, given his severe emphysema, doubt he will be able to use inhalers well.  encouraging secretion clearance, pulmonary toilet, etc  Would have GI and speech and swallow evaluate again.  aspiration precautions    head of bed elevation at 90 degrees when eating    puree diet with thin liquids     small bites - no straws - chin tuck maneuver  cardiac meds: ASA/lipitor/norvasc  GI/DVT prophylaxis - protonix/SQ lovenox     Thank you    Kalyani Mehta MD  Pulmonary medicine  [phone]  
87M with COPD, HTN, HLD presents to the ED with hypoxia, he is treated for COPD exacerbation and dysphagia. Pt was clear in not wanting any further hospitalizations. Geriatrics and Palliative Medicine Team is consulted for GOC

## 2022-07-21 NOTE — PROGRESS NOTE ADULT - PROBLEM SELECTOR PLAN 7
- DVT ppx: Lovenox qd  - Diet: NPO, pending S/S eval   - Dispo: pending clinical improvement      Asked patient if he wanted me to update any family and he shook his head no.
- DVT ppx: Lovenox qd  - Diet: puree  - Dispo: pending AVAPs set up, completing abx. poss 7/21       L/M for granddaughter Liz
- DVT ppx: Lovenox qd  - Diet: puree  - Dispo: pending AVAPs set up, completing abx. poss 7/21       Attempted to call granddaughter Liz again today.
- DVT ppx: Lovenox qd  - Diet: NPO  - Dispo: pending clinical improvement
- DVT ppx: Lovenox qd  - Diet: puree    Stable for dc home. Son Luis Alfredo will coordinate with grandchildren to bring home oxygen for transport.   d/w CM   Time spent on discharge: 50 min

## 2022-07-21 NOTE — DISCHARGE NOTE NURSING/CASE MANAGEMENT/SOCIAL WORK - PATIENT PORTAL LINK FT
You can access the FollowMyHealth Patient Portal offered by [facility] by registering at the following website: [email or web address]. By joining [facility]’s FollowMyHealth portal, you will also be able to view your health information using other applications (apps) compatible with our system.

## 2022-07-22 NOTE — ED ADULT NURSE NOTE - COVID-19  TEST TYPE
History  Chief Complaint   Patient presents with    Chest Pain    Shortness of Breath     Pt c/o sob with cp for about 2 days,      Patient ER with complaint of dyspnea on exertion, and chest pain for approximately 2 days  Patient is 4 days status post laparoscopic Fortunato-en-Y  She also states that she is unable to keep down the road recommended 1 oz of fluids every 15 minutes  She denies cough, fevers or chills  History provided by:  Patient   used: No    Shortness of Breath  Severity:  Moderate  Onset quality:  Sudden  Timing:  Constant  Progression:  Worsening  Chronicity:  New  Context: activity    Relieved by:  Nothing  Worsened by: Activity, deep breathing, exertion and movement  Ineffective treatments:  None tried  Associated symptoms: chest pain    Associated symptoms: no abdominal pain, no cough, no fever, no neck pain and no vomiting        Prior to Admission Medications   Prescriptions Last Dose Informant Patient Reported?  Taking?   acetaminophen (TYLENOL) 325 mg tablet 2022 at 1100  No Yes   Sig: Take 3 tablets (975 mg total) by mouth every 8 (eight) hours for 7 days Must cut or crush tabs   ondansetron (Zofran ODT) 4 mg disintegrating tablet 2022 at Unknown time  No Yes   Sig: Take 1 tablet (4 mg total) by mouth every 6 (six) hours as needed for nausea or vomiting   oxyCODONE (Roxicodone) 5 immediate release tablet 2022  No No   Sig: Take 1 tablet (5 mg total) by mouth every 4 (four) hours as needed for moderate pain Max Daily Amount: 30 mg   pantoprazole (PROTONIX) 40 mg tablet 2022 at 0900  No Yes   Sig: Take 1 tablet (40 mg total) by mouth daily      Facility-Administered Medications: None       Past Medical History:   Diagnosis Date    Anxiety     Morbid obesity with BMI of 50 0-59 9, adult ([facility] [address] )     Urinary tract infection        Past Surgical History:   Procedure Laterality Date     SECTION   and 2016    [place] LAP GASTRIC BYPASS/CECILE-EN-Y N/A 2022    Procedure: LAPAROSCOPIC CECILE-EN-Y GASTRIC BYPASS AND INTRAOPERATIVE EGD;  Surgeon: Janette Clifford MD;  Location: [facility];  Service: Bariatrics       Family History   Problem Relation Age of Onset    Lung cancer Mother     Obesity Mother     Cancer Mother         Passed away in 2006 of lung cancer    Thyroid disease Father     Obesity Father     Obesity Sister     Diabetes Neg Hx     Stroke Neg Hx     Heart disease Neg Hx      I have reviewed and agree with the history as documented  E-Cigarette/Vaping    E-Cigarette Use Never User      E-Cigarette/Vaping Substances    Nicotine No     THC No     CBD No     Flavoring No     Other No     Unknown No      Social History     Tobacco Use    Smoking status: Former Smoker     Packs/day: 0 25     Years: 5 00     Pack years: 1 25     Types: Cigarettes     Quit date: 6/10/2016     Years since quittin 1    Smokeless tobacco: Never Used    Tobacco comment: I smoked on and off not 5 years straight   Vaping Use    Vaping Use: Never used   Substance Use Topics    Alcohol use: Yes     Alcohol/week: 0 0 standard drinks     Comment: I will have a drink 1-2 times a year    Drug use: Not Currently     Types: Marijuana     Comment: I smoked a little in highschool       Review of Systems   Constitutional: Negative for chills and fever  Respiratory: Positive for shortness of breath  Negative for cough and chest tightness  Cardiovascular: Positive for chest pain  Gastrointestinal: Negative for abdominal pain, diarrhea, nausea and vomiting  Genitourinary: Negative for dysuria, frequency, hematuria and urgency  Musculoskeletal: Negative for back pain, neck pain and neck stiffness  All other systems reviewed and are negative  Physical Exam  Physical Exam  Vitals and nursing note reviewed  Constitutional:       General: She is not in acute distress  Appearance: She is well-developed   She is not diaphoretic  HENT:      Head: Normocephalic and atraumatic  Eyes:      Conjunctiva/sclera: Conjunctivae normal       Pupils: Pupils are equal, round, and reactive to light  Cardiovascular:      Rate and Rhythm: Normal rate and regular rhythm  Heart sounds: Normal heart sounds  No murmur heard  Pulmonary:      Effort: Pulmonary effort is normal  No respiratory distress  Breath sounds: Normal breath sounds  Abdominal:      General: Bowel sounds are normal  There is no distension  Palpations: Abdomen is soft  Tenderness: There is no abdominal tenderness  Musculoskeletal:         General: No deformity  Normal range of motion  Cervical back: Normal range of motion and neck supple  Skin:     General: Skin is warm and dry  Coloration: Skin is not pale  Findings: No rash  Neurological:      General: No focal deficit present  Mental Status: She is alert and oriented to person, place, and time  Cranial Nerves: No cranial nerve deficit     Psychiatric:         Behavior: Behavior normal          Vital Signs  ED Triage Vitals [07/22/22 1320]   Temperature Pulse Respirations Blood Pressure SpO2   98 4 °F (36 9 °C) 81 20 117/61 98 %      Temp Source Heart Rate Source Patient Position - Orthostatic VS BP Location FiO2 (%)   Temporal -- Sitting Left arm --      Pain Score       --           Vitals:    07/22/22 1320 07/22/22 1415 07/22/22 1515   BP: 117/61 101/54 114/57   Pulse: 81 102 72   Patient Position - Orthostatic VS: Sitting           Visual Acuity      ED Medications  Medications   sodium chloride 0 9 % bolus 1,000 mL (0 mL Intravenous Stopped 7/22/22 1551)   iohexol (OMNIPAQUE) 350 MG/ML injection (MULTI-DOSE) 70 mL (70 mL Intravenous Given 7/22/22 1524)       Diagnostic Studies  Results Reviewed     Procedure Component Value Units Date/Time    COVID only [[id number]]  (Normal) Collected: 07/22/22 1352    Lab Status: Final result Specimen: Nares from Nose Updated: 07/22/22 1454     SARS-CoV-2 Negative    Narrative:      FOR PEDIATRIC PATIENTS - copy/paste COVID Guidelines URL to browser: [email or web address]  [employer]    SARS-CoV-2 assay is a Nucleic Acid Amplification assay intended for the  qualitative detection of nucleic acid from SARS-CoV-2 in nasopharyngeal  swabs  Results are for the presumptive identification of SARS-CoV-2 RNA  Positive results are indicative of infection with SARS-CoV-2, the virus  causing COVID-19, but do not rule out bacterial infection or co-infection  with other viruses  Laboratories within the [place] and its  territories are required to report all positive results to the appropriate  public health authorities  Negative results do not preclude SARS-CoV-2  infection and should not be used as the sole basis for treatment or other  patient management decisions  Negative results must be combined with  clinical observations, patient history, and epidemiological information  This test has not been FDA cleared or approved  This test has been authorized by FDA under an Emergency Use Authorization  (EUA)  This test is only authorized for the duration of time the  declaration that circumstances exist justifying the authorization of the  emergency use of an in vitro diagnostic tests for detection of SARS-CoV-2  virus and/or diagnosis of COVID-19 infection under section 564(b)(1) of  the Act, 21 U  S C  059VIJ-5(Z)(4), unless the authorization is terminated  or revoked sooner  The test has been validated but independent review by FDA  and CLIA is pending  Test performed using [employer] GeneXpert: This RT-PCR assay targets N2,  a region unique to SARS-CoV-2  A conserved region in the E-gene was chosen  for pan-Sarbecovirus detection which includes SARS-CoV-2      HS Troponin 0hr (reflex protocol) [[id number]]  (Normal) Collected: 07/22/22 1352    Lab Status: Final result Specimen: Blood from Arm, Left Updated: 07/22/22 1432     hs TnI 0hr 2 ng/L     Lipase [[id number]]  (Normal) Collected: 07/22/22 1352    Lab Status: Final result Specimen: Blood from Arm, Left Updated: 07/22/22 1431     Lipase 192 u/L     NT-BNP PRO [[id number]]  (Abnormal) Collected: 07/22/22 1352    Lab Status: Final result Specimen: Blood from Arm, Left Updated: 07/22/22 1431     NT-proBNP 145 pg/mL     Comprehensive metabolic panel [[id number]]  (Abnormal) Collected: 07/22/22 1352    Lab Status: Final result Specimen: Blood from Arm, Left Updated: 07/22/22 1425     Sodium 140 mmol/L      Potassium 4 2 mmol/L      Chloride 105 mmol/L      CO2 22 mmol/L      ANION GAP 13 mmol/L      BUN 12 mg/dL      Creatinine 0 68 mg/dL      Glucose 73 mg/dL      Calcium 8 8 mg/dL      AST 97 U/L       U/L      Alkaline Phosphatase 69 U/L      Total Protein 6 8 g/dL      Albumin 3 5 g/dL      Total Bilirubin 0 78 mg/dL      eGFR 119 ml/min/1 73sq m     Narrative:      [facility] guidelines for Chronic Kidney Disease (CKD):     Stage 1 with normal or high GFR (GFR > 90 mL/min/1 73 square meters)    Stage 2 Mild CKD (GFR = 60-89 mL/min/1 73 square meters)    Stage 3A Moderate CKD (GFR = 45-59 mL/min/1 73 square meters)    Stage 3B Moderate CKD (GFR = 30-44 mL/min/1 73 square meters)    Stage 4 Severe CKD (GFR = 15-29 mL/min/1 73 square meters)    Stage 5 End Stage CKD (GFR <15 mL/min/1 73 square meters)  Note: GFR calculation is accurate only with a steady state creatinine    CBC and differential [[id number]]  (Abnormal) Collected: 07/22/22 1352    Lab Status: Final result Specimen: Blood from Arm, Left Updated: 07/22/22 1403     WBC 7 48 Thousand/uL      RBC 4 14 Million/uL      Hemoglobin 12 1 g/dL      Hematocrit 36 9 %      MCV 89 fL      MCH 29 2 pg      MCHC 32 8 g/dL      RDW 13 1 %      MPV 10 7 fL      Platelets 125 Thousands/uL      nRBC 0 /100 WBCs      Neutrophils Relative 76 %      Immat GRANS % 0 % Lymphocytes Relative 14 %      Monocytes Relative 7 %      Eosinophils Relative 3 %      Basophils Relative 0 %      Neutrophils Absolute 5 62 Thousands/µL      Immature Grans Absolute 0 03 Thousand/uL      Lymphocytes Absolute 1 05 Thousands/µL      Monocytes Absolute 0 52 Thousand/µL      Eosinophils Absolute 0 23 Thousand/µL      Basophils Absolute 0 03 Thousands/µL                  CTA ED chest PE study   Final Result by Hernan Tate MD (07/22 1540)      No acute finding; specifically, no pulmonary arterial embolism or pulmonary infiltrate/consolidation  Workstation performed: [id number]                    Procedures  ECG 12 Lead Documentation Only    Date/Time: 7/22/2022 1:23 PM  Performed by: Maureen Calderon DO  Authorized by: Maureen Calderon DO     Indications / Diagnosis:  Sob  ECG reviewed by me, the ED Provider: yes    Patient location:  ED  Previous ECG:     Previous ECG:  Compared to current    Similarity:  No change    Comparison to cardiac monitor: Yes    Interpretation:     Interpretation: normal    Rate:     ECG rate:  76bpm    ECG rate assessment: normal    Rhythm:     Rhythm: sinus rhythm    Ectopy:     Ectopy: none    QRS:     QRS axis:  Normal  Conduction:     Conduction: normal    ST segments:     ST segments:  Normal  T waves:     T waves: normal               ED Course             HEART Risk Score    Flowsheet Row Most Recent Value   Heart Score Risk Calculator    History 1 Filed at: 07/22/2022 1529   ECG 0 Filed at: 07/22/2022 1529   Age 0 Filed at: 07/22/2022 1529   Risk Factors 0 Filed at: 07/22/2022 1529   Troponin 0 Filed at: 07/22/2022 1529   HEART Score 1 Filed at: 07/22/2022 1529                        SBIRT 22yo+    Flowsheet Row Most Recent Value   SBIRT (25 yo +)    In order to provide better care to our patients, we are screening all of our patients for alcohol and drug use  Would it be okay to ask you these screening questions?  No Filed at: 07/22/2022 1344          Carlos Criteria for PE    Flowsheet Row Most Recent Value   Wells' Criteria for PE    Clinical signs and symptoms of DVT 0 Filed at: 07/22/2022 1347   PE is primary diagnosis or equally likely 3 Filed at: 07/22/2022 1347   HR >100 0 Filed at: 07/22/2022 1347   Immobilization at least 3 days or Surgery in the previous 4 weeks 1 5 Filed at: 07/22/2022 1347   Previous, objectively diagnosed PE or DVT 0 Filed at: 07/22/2022 1347   Hemoptysis 0 Filed at: 07/22/2022 1347   Malignancy with treatment within 6 months or palliative 0 Filed at: 07/22/2022 1347   Carlos Criteria Total 4 5 Filed at: 07/22/2022 1347                [facility]  Number of Diagnoses or Management Options  Chest pain: new and requires workup  Dyspnea: new and requires workup  Elevated liver enzymes: new and requires workup     Amount and/or Complexity of Data Reviewed  Clinical lab tests: ordered and reviewed  Tests in the radiology section of CPT®: ordered and reviewed    Risk of Complications, Morbidity, and/or Mortality  Presenting problems: high  Diagnostic procedures: high  Management options: high    Patient Progress  Patient progress: stable      Disposition  Final diagnoses:   Dyspnea   Chest pain   Elevated liver enzymes     Time reflects when diagnosis was documented in both MDM as applicable and the Disposition within this note     Time User Action Codes Description Comment    7/22/2022  3:48 PM Eulah Hawk Add [R06 00] Dyspnea     7/22/2022  3:48 PM Eulah Hawk O Add [R07 9] Chest pain     7/22/2022  3:53 PM Eulah Hawk Add [R74 8] Elevated liver enzymes       ED Disposition     ED Disposition   Discharge    Condition   Stable    Date/Time   Fri Jul 22, 2022  3:48 PM    Comment   Kyung Heller discharge to home/self care                 Follow-up Information     Follow up With Specialties Details Why [address] [place] [address] Schedule an appointment as soon as possible for a visit in 2 days for follow up [facility]            Discharge Medication List as of 7/22/2022  3:54 PM      CONTINUE these medications which have NOT CHANGED    Details   acetaminophen (TYLENOL) 325 mg tablet Take 3 tablets (975 mg total) by mouth every 8 (eight) hours for 7 days Must cut or crush tabs, Starting Tue 7/19/2022, Until Tue 7/26/2022, No Print      ondansetron (Zofran ODT) 4 mg disintegrating tablet Take 1 tablet (4 mg total) by mouth every 6 (six) hours as needed for nausea or vomiting, Starting Tue 7/19/2022, Normal      oxyCODONE (Roxicodone) 5 immediate release tablet Take 1 tablet (5 mg total) by mouth every 4 (four) hours as needed for moderate pain Max Daily Amount: 30 mg, Starting Wed 6/29/2022, Normal      pantoprazole (PROTONIX) 40 mg tablet Take 1 tablet (40 mg total) by mouth daily, Starting Wed 6/29/2022, Normal             No discharge procedures on file      PDMP Review     None          ED Provider  Electronically Signed by           Nohelia Peters DO  07/24/22 5042 MOLECULAR PCR

## 2022-07-27 ENCOUNTER — APPOINTMENT (OUTPATIENT)
Age: 87
End: 2022-07-27

## 2022-07-27 ENCOUNTER — NON-APPOINTMENT (OUTPATIENT)
Age: 87
End: 2022-07-27

## 2022-07-28 ENCOUNTER — NON-APPOINTMENT (OUTPATIENT)
Age: 87
End: 2022-07-28

## 2022-07-29 ENCOUNTER — APPOINTMENT (OUTPATIENT)
Age: 87
End: 2022-07-29

## 2022-07-29 DIAGNOSIS — J18.9 PNEUMONIA, UNSPECIFIED ORGANISM: ICD-10-CM

## 2022-07-29 DIAGNOSIS — J44.9 CHRONIC OBSTRUCTIVE PULMONARY DISEASE, UNSPECIFIED: ICD-10-CM

## 2022-07-29 DIAGNOSIS — R13.10 DYSPHAGIA, UNSPECIFIED: ICD-10-CM

## 2022-07-29 PROCEDURE — 99495 TRANSJ CARE MGMT MOD F2F 14D: CPT | Mod: 95

## 2022-07-29 RX ORDER — PANTOPRAZOLE 40 MG/1
40 TABLET, DELAYED RELEASE ORAL
Qty: 30 | Refills: 0 | Status: ACTIVE | COMMUNITY
Start: 2022-07-15

## 2022-07-29 RX ORDER — PREDNISONE 10 MG/1
10 TABLET ORAL
Qty: 1 | Refills: 0 | Status: COMPLETED | COMMUNITY
Start: 2022-07-21

## 2022-07-29 RX ORDER — PREDNISONE 20 MG/1
20 TABLET ORAL
Qty: 4 | Refills: 0 | Status: COMPLETED | COMMUNITY
Start: 2022-07-21

## 2022-07-29 RX ORDER — FELODIPINE 10 MG/1
10 TABLET, EXTENDED RELEASE ORAL
Qty: 90 | Refills: 0 | Status: ACTIVE | COMMUNITY
Start: 2022-01-19

## 2022-07-29 RX ORDER — ASPIRIN 81 MG/1
81 TABLET ORAL
Refills: 0 | Status: ACTIVE | COMMUNITY
Start: 2022-07-29

## 2022-07-29 RX ORDER — ALBUTEROL SULFATE 90 UG/1
108 (90 BASE) INHALANT RESPIRATORY (INHALATION)
Qty: 25 | Refills: 0 | Status: ACTIVE | COMMUNITY
Start: 2022-02-09

## 2022-07-29 RX ORDER — AMLODIPINE BESYLATE 10 MG/1
10 TABLET ORAL
Qty: 30 | Refills: 0 | Status: ACTIVE | COMMUNITY
Start: 2022-07-21

## 2022-07-29 RX ORDER — UMECLIDINIUM 62.5 UG/1
62.5 AEROSOL, POWDER ORAL
Qty: 90 | Refills: 0 | Status: ACTIVE | COMMUNITY
Start: 2022-04-05

## 2022-07-29 RX ORDER — ATORVASTATIN CALCIUM 40 MG/1
40 TABLET, FILM COATED ORAL
Qty: 90 | Refills: 0 | Status: ACTIVE | COMMUNITY
Start: 2022-01-19

## 2022-07-29 NOTE — PLAN
[FreeTextEntry1] : [employer] TCM STARS teaching: Patient advised to adhere to all medications including demonstrating proper use of inhalers and nebulizers. Increase activity as tolerated and maintain optimal activity levels. Continue coughing and deep breathing exercises including use of Incentive Spirometry. Continue with supplemental oxygen and SpO2 monitoring. Receive routine pneumococcal and influenza vaccinations. Maintain proper nutrition and adequate hydration. Notify MD/NP for worsening symptoms including fever, chills, SOB, CP, increased cough and secretions. Follow up with outpatient providers. Contact information given, patient advised to call with any questions/concerns. \par

## 2022-07-29 NOTE — HEALTH RISK ASSESSMENT
[Never] : Never [No falls in past year] : Patient reported no falls in the past year [With Patient/Caregiver] : , with patient/caregiver [Designated Healthcare Proxy] : Designated healthcare proxy [DNR] : DNR [DNI] : DNI [Name: ___] : Health Care Proxy's Name: [unfilled]  [Relationship: ___] : Relationship: [unfilled] [AdvancecareDate] : 7/29/2022 [FreeTextEntry4] : Has completed MOLST, DNR, DNI and do not hospitalized unless severe symptoms

## 2022-07-29 NOTE — PHYSICAL EXAM
[No Acute Distress] : no acute distress [Well Nourished] : well nourished [Well Developed] : well developed [Normal Sclera/Conjunctiva] : normal sclera/conjunctiva [Normal Outer Ear/Nose] : the outer ears and nose were normal in appearance [No Respiratory Distress] : no respiratory distress  [No Accessory Muscle Use] : no accessory muscle use [Normal Affect] : the affect was normal [Alert and Oriented x3] : oriented to person, place, and time [Normal Insight/Judgement] : insight and judgment were intact [de-identified] : Physical exam limited d/t telehealth encounter

## 2022-07-29 NOTE — REVIEW OF SYSTEMS
[Fatigue] : fatigue [Dyspnea on Exertion] : dyspnea on exertion [Negative] : Psychiatric [Fever] : no fever [Chills] : no chills [FreeTextEntry6] : intermittent cough

## 2022-07-29 NOTE — HISTORY OF PRESENT ILLNESS
[Home] : at home, [unfilled] , at the time of the visit. [Other Location: e.g. Home (Enter Location, City,State)___] : at [unfilled] [Verbal consent obtained from patient] : the patient, [unfilled] [FreeTextEntry1] : F/u post hospital discharge STAR PNA [de-identified] : Mr. Delacruz is a 86 yo male with pmh COPD, HTN, HLD, recent admission for COPD exacerbation (7/7-7/15) and presents to ED with shortness of breath found to be in acute hypoxic and hypercarbic respiratory failure. CTA chest negative for PE however shows RLL opacities concerning for aspiration PNA. He was admitted to [facility] 7/16-7/21 for Pneumonia. Symptoms improved with abx, steroids, and referral was made for hospice care as patient did not want rehospitalization.\par \par Mr. Delacruz is seen via telecommunication video visit, appears pleasant and in nad, collateral information provided by hamilton Griffin. Since discharge to home, still with some fatigue and CASTLE however breathing has improved overall. Received AVAPS machine and not using as he does not like it. Continues with supplemental O2, 2-4LPM, SpO2 96-99%. Reports tolerating dysphagia diet well and denies any choking or aspiration episodes. \par Patient and family opt out of hospice and will revisit if needed. \par He has home care services with [facility], speech therapist just arrived and PT scheduled for today as well. Ambulating without use of DME. Has f/u with Pulm Dr. Modi next week and granddaughter Liz will schedule f/u with GI. Aware team can provide assistance. Patient and family offers no other concerns at this time. 24/7 TCM contact provided. \par

## 2022-08-21 NOTE — ED ADULT TRIAGE NOTE - NS ED NURSE BANDS TYPE
When discharged, take only medications prescribed as instructed by your hospital provider    Do not stop or change any medications until you discuss changes with your own prescriber    Do not take any other medications, including left over medications from before your admission, over the counter medications or herbal supplements, unless you discuss with your own provider
Name band;

## 2022-09-06 NOTE — PROGRESS NOTE ADULT - PROBLEM SELECTOR PROBLEM 3
Leukocytosis
Helical Rim Advancement Flap Text: The defect edges were debeveled with a #15 blade scalpel.  Given the location of the defect and the proximity to free margins (helical rim) a double helical rim advancement flap was deemed most appropriate.  Using a sterile surgical marker, the appropriate advancement flaps were drawn incorporating the defect and placing the expected incisions between the helical rim and antihelix where possible.  The area thus outlined was incised through and through with a #15 scalpel blade.  With a skin hook and iris scissors, the flaps were gently and sharply undermined and freed up.

## 2022-10-25 NOTE — H&P ADULT - NSVTERISKREFERASSESS_GEN_ALL_CORE
[facility] Call Center    Phone Message    May a detailed message be left on voicemail: yes     Reason for Call: Order(s): Other:   Reason for requested: lab orders  Date needed: before appt 11/4  Provider name: Glenn Jones   Pt wife stated they need lab orders sent to care facility prior to appt 11/4 so labs can be done there. Note: writer did not see consent to communicate, pt wife stated she filled out form 10/5 in [facility].   Please call pt wife if communication needed. Thank you  Please fax orders to: [phone]      Action Taken: Message routed to:  Other: Uro    Travel Screening: Not Applicable                                                                         Refer to the Assessment tab to view/cancel completed assessment.

## 2022-11-07 ENCOUNTER — APPOINTMENT (OUTPATIENT)
Dept: GASTROENTEROLOGY | Facility: CLINIC | Age: 87
End: 2022-11-07

## 2023-04-24 NOTE — SWALLOW BEDSIDE ASSESSMENT ADULT - SWALLOW EVAL: CURRENT DIET
[Slightly Antalgic] : slightly antalgic [Wheelchair] : uses a wheelchair Clear liquid diet [LE] : Sensory: Intact in bilateral lower extremities [DP] : dorsalis pedis 2+ and symmetric bilaterally [PT] : posterior tibial 2+ and symmetric bilaterally [Normal] : Alert and in no acute distress [Poor Appearance] : well-appearing [Acute Distress] : not in acute distress [de-identified] : The patient has no respiratory distress. Mood and affect are normal. The patient is alert and oriented to person, place and time.\par There is no pain with knee motion.  Examination of the left foot and ankle demonstrates no tenderness of the ankle.  There is no tenderness of the left foot.  There is no pain with active motion.  The skin is intact.  There is no lymphedema.  She walks with a limp. [de-identified] : AP, lateral and oblique x-rays of the left foot taken today demonstrate continued healing of the fifth metatarsal fracture.  Complete bony union has not yet been attained

## 2023-06-06 NOTE — DISCHARGE NOTE PROVIDER - NSDCHHPTASSISTHOME_GEN_ALL_CORE
EMERGENCY DEPARTMENT ENCOUNTER     NAME: Riley Rodriguez   AGE: 89 year old male   YOB: 1925   MRN: [id number]   EVALUATION DATE & TIME: 6/5/2023  7:10 PM   PCP: Jac Pitt     Chief Complaint   Patient presents with     Cough     Shortness of Breath   :    FINAL IMPRESSION       1. Urinary tract infection without hematuria, site unspecified    2. Hypoxia           ED COURSE & MEDICAL DECISION MAKING      Pertinent Labs & Imaging studies reviewed. (See chart for details)   89 year old male  presents to the Emergency Department for evaluation of cough and shortness of breath. Initial Vitals Reviewed. Initial exam notable for elderly male who is very fatigued appearing, who arrived with some heavier breathing, and he was initially on 10 L by oxy mask, but we are easily able to titrate him down to 3 L nasal cannula with sats in the high 90s.  I am actually not certain if he is truly hypoxic, and I suspect what probably happened is he spiked a fever and that is when he started to look like he was breathing heavier.  It was discovered that he had a temperature of 102.3 here.  I did consider something like pneumonia, but his chest x-ray is clear and if he already has a fever and the choking episode at dinner just happen, it would be too early for an aspiration pneumonia that is not seen, and it would be odd that he already has a fever.  I therefore broadened his work-up and discovered that he has a urinary tract infection that is nitrite positive.  I am starting some IV antibiotics with Rocephin, and given the continued need for small amount of oxygen, the fever and UTI in an elderly patient, I have discussed the case with hospitalist for admission.        7:13 PM I introduced myself to the patient, obtained patient history, performed a physical exam, and discussed plan for ED workup including potential diagnostic laboratory/imaging studies and interventions.  10:53 PM Paging hospitalist to admit  "patient  10:59 PM Spoke with hospitalist, Dr. Khanna, who accepts the patient.       At the conclusion of the encounter I discussed the results of all of the tests and the disposition. The questions were answered. The patient or family acknowledged understanding and was agreeable with the care plan.     45 minutes critical care time, see procedure note below for details if relevant    Medical Decision Making    History:    Supplemental history from: EMS    External Record(s) reviewed: Outpatient Record: phone encounter from 6/5/23    Work Up:    Chart documentation includes differential considered and any EKGs or imaging independently interpreted by provider, where specified.    In additional to work up documented, I considered the following work up: Documented in chart, if applicable.    External consultation:    Discussion of management with another provider: Hospitalist    Complicating factors:    Care impacted by chronic illness: Dementia and Hypertension    Care affected by social determinants of health: Access to Medical Care    Disposition considerations: Admit.        MEDICATIONS GIVEN IN THE EMERGENCY:   Medications   cefTRIAXone (ROCEPHIN) 1 g vial to attach to  mL bag for ADULTS or NS 50 mL bag for PEDS (has no administration in time range)      NEW PRESCRIPTIONS STARTED AT TODAY'S ER VISIT   New Prescriptions    No medications on file     ================================================================   HISTORY OF PRESENT ILLNESS       Patient information was obtained from: Nurse  Use of Intrepreter: N/A   Riley Rodriguez is a 89 year old male with history of atrial fibrillation, CVA, stroke, bells palsy, dementia, anemia suprapubic cath, and hypertension who presents with cough and shortness of breath.     After patient ate dinner, care facility staff noticed he had a \"rattly chest and was coughing\". They think he aspirated. Pulse oc was 75-80% on RA. Patient was given 4L via NC. SpO2 increased to " 90%. EMS administered 10L via NRB mask and states are 99%.        ================================================================        PAST HISTORY     PAST MEDICAL HISTORY:   Past Medical History:   Diagnosis Date     Atrial fibrillation (H)     On coumadin     Bell's palsy     right sided facial droop     CVA (cerebral vascular accident) (H)      Hypertension      Pacemaker      Urinary retention       PAST SURGICAL HISTORY:   Past Surgical History:   Procedure Laterality Date     HC TRANSURETHRAL ELEC-SURG PROSTATECTOM      Description: Transurethral Resection Of Prostate (TURP);  Recorded: 03/24/2008;     IR AORTIC ARCH 4 VESSEL ANGIOGRAM  1/1/2004     IR BLADDER SUPRAPUBIC CATHETER INSERTION  1/18/2019     IR MISCELLANEOUS PROCEDURE  1/1/2004     IR MISCELLANEOUS PROCEDURE  1/1/2004     IR MISCELLANEOUS PROCEDURE  1/1/2004     IR SUPRAPUBIC CATHETER PLACMENT  1/18/2019     IR THORACIC AORTOGRAM  1/1/2004     IR VISCERAL ANGIOGRAM  1/1/2004     IR VISCERAL ANGIOGRAM  1/1/2004     IR VISCERAL ANGIOGRAM  1/1/2004     IR VISCERAL ANGIOGRAM  1/1/2004     IR VISCERAL ANGIOGRAM  1/1/2004     IR VISCERAL ANGIOGRAM  1/1/2004     IR VISCERAL ANGIOGRAM  1/1/2004     AR ESOPHAGOGASTRODUODENOSCOPY TRANSORAL DIAGNOSTIC N/A 8/15/2020    Procedure: ESOPHAGOGASTRODUODENOSCOPY (EGD) with biopsy;  Surgeon: Paxton Villalpando MD;  Location: [facility];  Service: Gastroenterology     AR PARTIAL EXCISION THYROID,UNILAT      Description: Thyroid Surgery Sub-Total Thyroidectomy;  Recorded: 03/24/2008;     REMV PILONIDAL LESION SIMPLE      Description: Pilonidal Cyst Resection;  Recorded: 03/24/2008;     TOTAL HIP ARTHROPLASTY Right       CURRENT MEDICATIONS:   acetaminophen (TYLENOL) 500 MG tablet  atorvastatin (LIPITOR) 40 MG tablet  bisacodyL (DULCOLAX) 10 mg suppository  docusate sodium (COLACE) 100 MG capsule  Docusate Sodium (DOCU LIQUID PO)  dorzolamide-timoloL (COSOPT) 22.3-6.8 mg/mL ophthalmic solution  erythromycin base  (ERYTHROMYCIN OPHT)  ferrous sulfate 325 (65 FE) MG tablet  guaiFENesin (ROBITUSSIN) 100 mg/5 mL syrup  latanoprost (XALATAN) 0.005 % ophthalmic solution  levothyroxine (SYNTHROID, LEVOTHROID) 25 MCG tablet  MEDICATION CANNOT BE REORDERED - PLEASE MANUALLY REORDER AND DISCONTINUE THE OLD ORDER  menthol-zinc oxide (CALMOSEPTINE) 0.44-20.6 % Oint ointment  nystatin (MYCOSTATIN) 525349 UNIT/GM external powder  omeprazole (PRILOSEC) 20 MG capsule  Propylene Glycol (SYSTANE COMPLETE OP)  WARFARIN SODIUM PO      ALLERGIES:   No Known Allergies   FAMILY HISTORY:   Family History   Problem Relation Age of Onset     Chronic Obstructive Pulmonary Disease Father       SOCIAL HISTORY:   Social History     Socioeconomic History     Marital status:    Tobacco Use     Smoking status: Former     Smokeless tobacco: Never   Substance and Sexual Activity     Alcohol use: No     Drug use: No   Social History Narrative    03/07/15 - The patient lives alone in his own home.        VITALS  Patient Vitals for the past 24 hrs:   BP Temp Pulse Resp SpO2   06/05/23 2218 133/63 -- 72 -- 97 %   06/05/23 2100 121/56 -- 73 -- 96 %   06/05/23 2000 130/61 -- 76 -- 97 %   06/05/23 1952 (!) 156/69 (!) 102.3  F (39.1  C) 79 -- 97 %   06/05/23 1919 -- -- -- 20 --   06/05/23 1913 (!) 163/76 -- 90 -- 99 %        ================================================================    PHYSICAL EXAM     VITAL SIGNS: /63   Pulse 72   Temp (!) 102.3  F (39.1  C)   Resp 20   SpO2 97%    Constitutional:  Awake, no acute distress   HENT:  Atraumatic, oropharynx without exudate or erythema, membranes moist  Lymph:  No adenopathy  Eyes: EOM intact, PERRL, no injection  Neck: Supple  Respiratory:  Clear to auscultation bilaterally, no wheezes or crackles   Cardiovascular:  Regular rate and rhythm, single S1 and S2   GI:  Soft, nontender, nondistended, no rebound or guarding   Musculoskeletal:  Moves all extremities, no lower extremity edema, no  deformities    Skin:  Warm, dry  Neurologic:  Alert and oriented x3, no focal deficits noted       ================================================================  LAB       All pertinent labs reviewed and interpreted.   Labs Ordered and Resulted from Time of ED Arrival to Time of ED Departure   BASIC METABOLIC PANEL - Abnormal       Result Value    Sodium 132 (*)     Potassium 4.0      Chloride 99      Carbon Dioxide (CO2) 24      Anion Gap 9      Urea Nitrogen 15.5      Creatinine 0.77      Calcium 8.4      Glucose 148 (*)     GFR Estimate 81     CBC WITH PLATELETS AND DIFFERENTIAL - Abnormal    WBC Count 11.2 (*)     RBC Count 4.00 (*)     Hemoglobin 12.0 (*)     Hematocrit 36.1 (*)     MCV 90      MCH 30.0      MCHC 33.2      RDW 14.7      Platelet Count 210      % Neutrophils 84      % Lymphocytes 5      % Monocytes 11      % Eosinophils 0      % Basophils 0      % Immature Granulocytes 0      NRBCs per 100 WBC 0      Absolute Neutrophils 9.4 (*)     Absolute Lymphocytes 0.5 (*)     Absolute Monocytes 1.2      Absolute Eosinophils 0.0      Absolute Basophils 0.0      Absolute Immature Granulocytes 0.0      Absolute NRBCs 0.0     ROUTINE UA WITH MICROSCOPIC REFLEX TO CULTURE - Abnormal    Color Urine Yellow      Appearance Urine Clear      Glucose Urine Negative      Bilirubin Urine Negative      Ketones Urine Negative      Specific Gravity Urine 1.017      Blood Urine 0.03 mg/dL (*)     pH Urine 5.0      Protein Albumin Urine Negative      Urobilinogen Urine <2.0      Nitrite Urine Positive (*)     Leukocyte Esterase Urine 500 Toni/uL (*)     Bacteria Urine Few (*)     Mucus Urine Present (*)     RBC Urine 3 (*)     WBC Urine 28 (*)    LACTIC ACID WHOLE BLOOD - Normal    Lactic Acid 1.4     INFLUENZA A/B, RSV, & SARS-COV2 PCR   BLOOD CULTURE   BLOOD CULTURE   URINE CULTURE        ===============================================================  RADIOLOGY       Reviewed all pertinent imaging. Please see official  radiology report.   XR Chest 2 Views   Final Result   IMPRESSION: Pulmonary vascular congestion with increased interstitial opacities consistent with mild pulmonary edema. Probable small left pleural effusion. No pneumothorax.      Unchanged enlarged cardiomediastinal silhouette with single lead pacemaker. Unchanged pleural calcification on the left.            ================================================================  EKG         I have independently reviewed and interpreted the EKG(s) documented above.     ================================================================  PROCEDURES     Critical Care  Performed by: Darlin Morris MD  Authorized by: Darlin Morris MD  Total critical care time: 45 minutes  Critical care time was exclusive of separately billable procedures and treating other patients.  Critical care was necessary to treat or prevent imminent or life-threatening deterioration of the following conditions: hypoxia  Critical care was time spent personally by me on the following activities: development of treatment plan with patient or surrogate, discussions with consultants, examination of patient, evaluation of patient's response to treatment, obtaining history from patient or surrogate, ordering and performing treatments and interventions, ordering and review of laboratory studies, ordering and review of radiographic studies and re-evaluation of patient's condition, this excludes any separately billable procedures.          I, Danilo Mane, am serving as a scribe to document services personally performed by Dr. Morris based on my observation and the provider's statements to me. I, Darlin Morris MD attest that Danilo Mane is acting in a scribe capacity, has observed my performance of the services and has documented them in accordance with my direction.   Darlin Morris M.D.   Emergency Medicine   [facility] EMERGENCY DEPARTMENT  [address]  [facility] [address]  [phone]  Dept: [phone]      Darlin Morris MD  06/05/23 6851     Patient Needs Assistance to Leave Residence...

## 2024-01-05 NOTE — DIETITIAN INITIAL EVALUATION ADULT. - NSPROEDAREADYLEARN_GEN_A_NUR
January 5, 2024     Patient: Elia Lee   YOB: 1993   Date of Visit: 1/5/2024       To Whom it May Concern:    Elia Lee was seen in my clinic on 1/5/2024 at 8:15 am.    Please excuse Elia for his absence from work on the date listed above to be able to make his appointment.    Sincerely,         Miguel Angel Tracy MD    Medical information is confidential and cannot be disclosed without the written consent of the patient or his representative.       acuteness of illness/interest in learning

## 2024-01-12 NOTE — PROGRESS NOTE ADULT - PROBLEM SELECTOR PLAN 4
- cw home meds as needed  - albuterol prn  - Nebulizer prn  - O2 as needed
PAST SURGICAL HISTORY:  No significant past surgical history

## 2024-03-30 NOTE — ED PROVIDER NOTE - HIV OFFER
Care Management Follow Up    Length of Stay (days): 2    Expected Discharge Date: TBD     Concerns to be Addressed:     Discharge Planning  Patient plan of care discussed at interdisciplinary rounds: Yes    Anticipated Discharge Disposition:  TBD     Anticipated Discharge Services: TBD   Anticipated Discharge DME:  TBD    Patient/family educated on Medicare website which has current facility and service quality ratings:  No  Education Provided on the Discharge Plan: No   Patient/Family in Agreement with the Plan:  N/A    Referrals Placed by CM/SW:  None at this time  Private pay costs discussed: Not applicable    Additional Information:  ZAIRA reviewed pt chart for discharge planning. Pt currently on 1:1 sitter due to confusion and passive SI, as per bedside team. Pt lives at [facility] and is reported to be on 1:1 cares at baseline.  staff were called at both [phone] and [phone]. ZAIRA was also informed that pt has [place] ZAIRA,  (Vicky), and his mother is another contact for information. ZAIRA was unable to reach mother listed in emergency contacts.    ZAIRA will forward to unit SW by way of this note, for care management assessment.    ANGEL Oviedo   3/30/2024       Social Work and Care Management Department       SEARCHABLE in [employer] - search SOCIAL WORK       [facility] (0800 - 1630) Saturday and Sunday     Units: 4A Vocera, 4C Vocera, & 4E Vocera    Pager: [phone]     Units: 5A 1117-3282 Vocera, 5A 0874-7107 Vocera , BMT SW 1, BMT SW 2, BMT SW 3 & BMT SW 4 Pager: [phone]     Units: 6A Vocera & 6B Vocera  Pager: [phone]     Units: 6C Vocera Pager: [phone]     Units: 7A Vocera & 7B Vocera  Pager: [phone]     Units: 7C Med Surg 7401 thru 7418 and 7C Med Surg 7502 thru 7521  Pager: [phone]     Unit: [facility] ED Vocera & [facility] Obs Vocera Pager: [phone]      [facility] [place] (4921-2203) Saturday and Sunday      Units: 5 Ortho Vocera, 5  Med Surg Vocera & WB ED Vocera Pager:[phone]     Units: 6 Med Surg Vocera, 8 Med Surg Vocera, & 10 ICU Vocera Pager: [phone]        After hours Vocera [facility] and After Hours Vocera [place]     Saturday & Sunday (1630 - 0000)    Mon-Fri (9162-2347)     [facility] Recognized Holidays  (5616-3193)    Units: ALL  Pager: [phone]      ANGEL Oviedo      Previously Declined (within the last year)

## 2024-04-09 NOTE — PRE-ANESTHESIA EVALUATION ADULT - NSDENTALSD_ENT_ALL_CORE
Subjective   Mayo Wilhelm Jr. is a 36 y.o. male who presents to the [facility] for follow-up of HIV infection.   Here for sick visit as called as few days ago c/o peeling lips, hands (palms) and also glan penis. The lips, glans have resolved but palms are peeling.  Was doing peroxide mouth wash but stopped. Was in Ed with intractable hiccups, did get a couple doses of thorazine. Otherwise no new meds  Objective   Vitals:    04/09/24 1129   BP: 128/76   Pulse: 69   Resp: 16   Temp: 36.6 °C (97.8 °F)   SpO2: 98%        Current Outpatient Medications:     ARIPiprazole (Abilify) 30 mg tablet, Take 1 tablet (30 mg) by mouth once daily., Disp: , Rfl:     baclofen (Lioresal) 20 mg tablet, Take by mouth., Disp: , Rfl:     cholecalciferol (Vitamin D-3) 25 MCG (1000 UT) capsule, Take 1 capsule (25 mcg) by mouth 3 times a day., Disp: , Rfl:     dolutegravir (Tivicay) 50 mg tablet, Take 1 tablet (50 mg) by mouth once daily., Disp: 30 tablet, Rfl: 5    emtricitabine-tenofovir alafen (Descovy) 200-25 mg tablet, Take 1 tablet by mouth once daily., Disp: 30 tablet, Rfl: 5    escitalopram (Lexapro) 20 mg tablet, Take 1 tablet (20 mg) by mouth once daily at bedtime., Disp: , Rfl:     gabapentin (Neurontin) 100 mg capsule, Take 1 capsule (100 mg) by mouth 2 times a day., Disp: , Rfl:     L. acidophilus/Bifid. animalis 32 billion cell capsule, Take by mouth., Disp: , Rfl:     multivitamin with minerals (Multiple Vitamin-Minerals) tablet, Take 1 tablet by mouth once daily., Disp: , Rfl:     traZODone (Desyrel) 50 mg tablet, Take 1-2 tablets ( mg) by mouth as needed at bedtime., Disp: , Rfl:     triamcinolone (Kenalog) 0.1 % cream, 1 Application, Disp: , Rfl:    Physical Exam  Physical Exam  Constitutional:       General: not in acute distress.     Appearance: Normal appearance.   HENT:      Head: Normocephalic and atraumatic.      Pharynx: Oropharynx is clear. No oropharyngeal exudate.   Eyes:      General: No scleral  "icterus.  Cardiovascular:      Rate and Rhythm: Normal rate and regular rhythm.   Pulmonary:      Breath sounds: Normal breath sounds. No wheezing or rhonchi.   Abdominal:      Palpations: Abdomen is soft.      Tenderness: There is no abdominal tenderness.   Musculoskeletal:      Cervical back: Neck supple.      Right lower leg: No edema.      Left lower leg: No edema.   Skin:     Findings: palms desquamating but no underlying rash   Neurological:      Mental Status: alert.   Psychiatric:         Mood and Affect: Mood normal.     Laboratory  Lab Results   Component Value Date    EEA8IETIC <Quantification (A) 09/19/2023    EV1WWN3HPDU 0.710 01/30/2024    VITD25 92 09/27/2022      Lab Results   Component Value Date    WBC 4.4 01/30/2024    HGB 14.1 01/30/2024    HCT 42.1 01/30/2024    MCV 86 01/30/2024     01/30/2024      Lab Results   Component Value Date    GLUCOSE 75 01/30/2024    CALCIUM 9.7 01/30/2024     01/30/2024    K 4.2 01/30/2024    CO2 30 01/30/2024     01/30/2024    BUN 8 01/30/2024    CREATININE 0.95 01/30/2024      Lab Results   Component Value Date    CHOL 166 01/30/2024    CHOL 161 05/05/2023    CHOL 181 09/27/2022     Lab Results   Component Value Date    HDL 53.5 01/30/2024    HDL 45.7 05/05/2023    HDL 42.5 09/27/2022     Lab Results   Component Value Date    LDLCALC 103 (H) 01/30/2024     Lab Results   Component Value Date    TRIG 48 01/30/2024    TRIG 65 05/05/2023    TRIG 69 09/27/2022     No components found for: \"CHOLHDL\"      Assessment/Plan   Problem List Items Addressed This Visit       Rash and other nonspecific skin eruption    Current Assessment & Plan     Desquamating rash on palms, every where lese has resolved. ? Allergic but not sure to what. To try just mouisterizer on palms and avoid antifungals snd neosporin         Syphilis    Current Assessment & Plan     Has had persistant ? Serofast, check today         HIV (human immunodeficiency virus infection) (CMS/[facility]) "    Current Assessment & Plan     UD with great adherence, just had labs last month. Continue descovy/dtg          Other Visit Diagnoses       Routine screening for STI (sexually transmitted infection)    -  Primary    Relevant Orders    RPR Monitoring    C. Trachomatis / N. Gonorrhoeae, Amplified Detection    C. Trachomatis / N. Gonorrhoeae, Amplified Detection    C. Trachomatis / N. Gonorrhoeae, Amplified Detection    Urinary frequency        Relevant Orders    Urinalysis with Reflex Culture and Microscopic    Rash        Relevant Orders    CBC and Auto Differential    Comprehensive metabolic panel           Health Maintenance    Malissa Shelby MD    missing teeth

## 2025-03-25 NOTE — DIETITIAN INITIAL EVALUATION ADULT - % CHANGE
Medication:   Requested Prescriptions     Pending Prescriptions Disp Refills    vitamin B-12 (CYANOCOBALAMIN) 500 MCG tablet [Pharmacy Med Name: VITAMIN B-12 500 MCG TABLET] 90 tablet 1     Sig: TAKE 1 TABLET BY MOUTH EVERY DAY    gabapentin (NEURONTIN) 400 MG capsule [Pharmacy Med Name: GABAPENTIN 400 MG CAPSULE] 180 capsule 3     Sig: TAKE 2 CAPSULES BY MOUTH 3 TIMES A DAY        Last Filled:  09/27/2024  10/11/2024    Patient Phone Number: [phone] (home)     Last appt: 3/13/2025   Next appt: Visit date not found    Last OARRS:        No data to display                   18.97

## 2025-07-30 NOTE — PATIENT PROFILE ADULT - LOCATION #1
pt requesting to leave prior to FAVIO/disimpaction   pt claims he had a bowel movement and requesting dc with miralax sacrum

## (undated) DEVICE — TUBING SUCTION CONN 6FT STERILE

## (undated) DEVICE — FOLEY HOLDER STATLOCK 2 WAY ADULT

## (undated) DEVICE — CATH IV SAFE BC 22G X 1" (BLUE)

## (undated) DEVICE — PACK IV START WITH CHG

## (undated) DEVICE — SUCTION YANKAUER NO CONTROL VENT

## (undated) DEVICE — SYR ALLIANCE II INFLATION 60ML

## (undated) DEVICE — SOL INJ NS 0.9% 500ML 2 PORT

## (undated) DEVICE — BALLOON US ENDO

## (undated) DEVICE — BITE BLOCK ADULT 20 X 27MM (GREEN)

## (undated) DEVICE — CATH IV SAFE BC 20G X 1.16" (PINK)

## (undated) DEVICE — TUBING SUCTION 20FT

## (undated) DEVICE — SENSOR O2 FINGER ADULT

## (undated) DEVICE — TUBING IV SET GRAVITY 3Y 100" MACRO

## (undated) DEVICE — FORCEP RADIAL JAW 4 LG CAPACITY 2.4MM 2.8MM 160CM YELLOW DISP